# Patient Record
Sex: FEMALE | Race: WHITE | Employment: OTHER | URBAN - METROPOLITAN AREA
[De-identification: names, ages, dates, MRNs, and addresses within clinical notes are randomized per-mention and may not be internally consistent; named-entity substitution may affect disease eponyms.]

---

## 2016-05-23 LAB
CREATININE, EXTERNAL: 0.8
LDL-C, EXTERNAL: 116.4

## 2017-03-28 DIAGNOSIS — C56.9 OVARIAN CANCER, UNSPECIFIED LATERALITY (HCC): Primary | ICD-10-CM

## 2017-03-29 LAB — CANCER AG125 SERPL-ACNC: 8.7 U/ML (ref 0–38.1)

## 2017-04-05 ENCOUNTER — TELEPHONE (OUTPATIENT)
Dept: GYNECOLOGY | Age: 82
End: 2017-04-05

## 2017-04-05 NOTE — TELEPHONE ENCOUNTER
Pt calling to office for  results from 3/28/17, I advised pt value was 8.7, pt verbalized understanding

## 2017-10-03 ENCOUNTER — OFFICE VISIT (OUTPATIENT)
Dept: GYNECOLOGY | Age: 82
End: 2017-10-03

## 2017-10-03 VITALS
DIASTOLIC BLOOD PRESSURE: 77 MMHG | SYSTOLIC BLOOD PRESSURE: 160 MMHG | BODY MASS INDEX: 27.25 KG/M2 | WEIGHT: 168.8 LBS | HEART RATE: 59 BPM

## 2017-10-03 DIAGNOSIS — Z91.89 GYN EXAM FOR HIGH-RISK MEDICARE PATIENT: Primary | ICD-10-CM

## 2017-10-03 DIAGNOSIS — Z85.43 HISTORY OF OVARIAN CANCER: ICD-10-CM

## 2017-10-03 RX ORDER — ESTRADIOL 0.1 MG/G
CREAM VAGINAL
COMMUNITY
Start: 2017-06-27

## 2017-10-03 NOTE — PROGRESS NOTES
One year check up, pt reports no abnormal spotting or bleeding, pt states she has no questions or concerns for today's visit, pt states that no internal to be done, declines to get undressed, Initial blood pressure reading 170/93, repeat blood pressure reading 160/77 , Patient states she is no longer taking the following medications: Clobetasol

## 2017-10-03 NOTE — PROGRESS NOTES
[address] [facility] [address], [address] [facility]  [phone]  MD Gaston Pérez MD    Patient ID:  Deepak Amado  [id number]  1935/82 y.o. Visit date: 10/3/2017      Diagnosis:       ICD-10-CM ICD-9-CM    1. GYN exam for high-risk Medicare patient Z91.89 V72.31      V15.89    2. History of ovarian cancer Z85.43 V10.43      INTERVAL HISTORY: Deepak Amado is a  female with a history of ovarian cancer. Recent office visit for vulvitis. Rx Clobetasol qhs. The patient presents today in followup    Pruritis improving, cracking of the L minora noted. Pathology:   FINAL PATHOLOGIC DIAGNOSIS  1. Vulva, right 11 o'clock, biopsy:  Subacute spongiotic vulvitis  2. Vulva, left 1 o'clock, biopsy:  Subacute spongiotic vulvitis  See comment  Comment  The findings could be seen in a variety of forms of spongiotic (eczematous) vulvitis. Leading considerations would  include contact vulvitis and a drug reaction. A review of medications especially topical agents may be beneficial    Imaging history: Mammogram current  Chemotherapy history: See record  10/3/2017:  The patient is seen at a one year interval. No complaints.   Refused pelvic examination  Ongoing treatment for urinary incontinence   PT.    OB/GYN ROS: Denies, dysuria, hematuria, vaginal discharge, abnormal vaginal bleeding, pelvic pain    Past Medical History:   Diagnosis Date    History of chemotherapy 2002    Taxol/carboplatin x 6    Ovarian cancer ([facility] [address] 2002       Past Surgical History:   Procedure Laterality Date    BREAST SURGERY PROCEDURE UNLISTED      breast biopsy 1983    HX GYN  2002    MARGARETTE/BSO, dilation and curratagex2    HX TONSILLECTOMY      HX VASCULAR ACCESS  2002    INSERTION / REMOVAL       Social History     Social History    Marital status:      Spouse name: N/A    Number of children: N/A    Years of education: N/A     Occupational History  Not on file. Social History Main Topics    Smoking status: Former Smoker    Smokeless tobacco: Never Used    Alcohol use Yes      Comment: RARE    Drug use: No    Sexual activity: Not on file     Other Topics Concern    Not on file     Social History Narrative       Family History   Problem Relation Age of Onset    Heart Disease Mother     Heart Disease Father     Cancer Maternal Aunt      LUNG    Cancer Maternal Aunt      BREAST       Current Outpatient Prescriptions on File Prior to Visit   Medication Sig Dispense Refill    cholecalciferol (VITAMIN D3) 1,000 unit tablet Take  by mouth daily.  CALCIUM CARBONATE/VITAMIN D3 (CALCIUM + D PO) Take  by mouth daily.  CARBOXYMETHYLCELLULOSE SODIUM (THERA TEARS OP) Apply  to eye four (4) times daily.  BIOTIN PO Take  by mouth daily.  clobetasol (TEMOVATE) 0.05 % ointment Apply  to affected area nightly. 15 g 3    travoprost (TRAVATAN Z) 0.004 % ophthalmic solution Administer 1 Drop to both eyes every evening. No current facility-administered medications on file prior to visit.         No Known Allergies    ROS:  Review of Systems - General ROS: negative for - chills, fatigue, hot flashes or weight loss  Hematological and Lymphatic ROS: negative for - bruising or weight loss  Endocrine ROS: Negative  Respiratory ROS: no cough, shortness of breath, or wheezing  Cardiovascular ROS: no chest pain or dyspnea on exertion  Gastrointestinal ROS: no abdominal pain, change in bowel habits, or black or bloody stools  Genito-Urinary ROS: negative for - genital discharge, hematuria       OBJECTIVE:  PHYSICAL EXAM  VITAL SIGNS: Visit Vitals    /77 (BP 1 Location: Left arm, BP Patient Position: Sitting)    Pulse (!) 59    Wt 168 lb 12.8 oz (76.6 kg)    BMI 27.25 kg/m2      GENERAL ERIN: in no apparent distress, well developed and well nourished, in no respiratory distress and acyanotic, alert and afebrile  Nodes negative  Lungs: Clear to auscultation and percussion  Cardiac: RRR  Abdomen: Soft, nontender. No masses, organomegaly or fluid wave. No CVAT:  Pelvic Deferred by patient  Neuro: Grossly intact. DATE REVIEW as available:  Lab Results   Component Value Date/Time    WBC 6.2 02/26/2013 01:50 PM     Lab Results   Component Value Date/Time    Sodium 139 02/26/2013 01:50 PM       IMPRESSION AND PLAN:    Robinson Olea has a working diagnosis of a normal annual examination. History of ovarian cancer.     ZACK   Urinary incontinence      Return annually or PRN    Marilee Mcnamara MD  10/3/2017/7:41 AM

## 2017-10-03 NOTE — PATIENT INSTRUCTIONS
Da[employer]is Activation    Thank you for requesting access to [employer]. Please follow the instructions below to securely access and download your online medical record. [employer] allows you to send messages to your doctor, view your test results, renew your prescriptions, schedule appointments, and more. How Do I Sign Up? 1. In your internet browser, go to [email or web address]. 2. Click on the First Time User? Click Here link in the Sign In box. You will see the New Member Sign Up page. 3. Enter your Daegis Access Code exactly as it appears below. You will not need to use this code after youve completed the sign-up process. If you do not sign up before the expiration date, you must request a new code. Daegis Access Code: [email or web address]  Expires: 2018 10:54 AM (This is the date your Daegis access code will )    4. Enter the last four digits of your Social Security Number (xxxx) and Date of Birth (mm/dd/yyyy) as indicated and click Submit. You will be taken to the next sign-up page. 5. Create a Daegis ID. This will be your [employer]egis login ID and cannot be changed, so think of one that is secure and easy to remember. 6. Create a Daegis password. You can change your password at any time. 7. Enter your Password Reset Question and Answer. This can be used at a later time if you forget your password. 8. Enter your e-mail address. You will receive e-mail notification when new information is available in [address]. 9. Click Sign Up. You can now view and download portions of your medical record. 10. Click the Download Summary menu link to download a portable copy of your medical information. Additional Information    If you have questions, please visit the Frequently Asked Questions section of the [employer]egis website at [email or web address] Remember, [employer]is is NOT to be used for urgent needs. For medical emergencies, dial 911.

## 2018-02-19 ENCOUNTER — OFFICE VISIT (OUTPATIENT)
Dept: GYNECOLOGY | Age: 83
End: 2018-02-19

## 2018-02-19 VITALS
WEIGHT: 174.6 LBS | HEIGHT: 66 IN | BODY MASS INDEX: 28.06 KG/M2 | SYSTOLIC BLOOD PRESSURE: 180 MMHG | HEART RATE: 64 BPM | DIASTOLIC BLOOD PRESSURE: 93 MMHG

## 2018-02-19 DIAGNOSIS — Q52.5 LABIA MINORA AGGLUTINATION: ICD-10-CM

## 2018-02-19 DIAGNOSIS — Z85.43 HISTORY OF OVARIAN CANCER: Primary | ICD-10-CM

## 2018-02-19 RX ORDER — BIMATOPROST 0.1 MG/ML
SOLUTION/ DROPS OPHTHALMIC
COMMUNITY
Start: 2018-02-08

## 2018-02-19 NOTE — PROGRESS NOTES
Consult visit pt has questions, pt reports no abnormal spotting or bleeding, Initial blood pressure reading 168/105, repeat blood pressure reading 180/93

## 2018-05-16 ENCOUNTER — OFFICE VISIT (OUTPATIENT)
Dept: INTERNAL MEDICINE CLINIC | Age: 83
End: 2018-05-16

## 2018-05-16 VITALS
BODY MASS INDEX: 28.82 KG/M2 | OXYGEN SATURATION: 96 % | HEART RATE: 74 BPM | RESPIRATION RATE: 14 BRPM | HEIGHT: 65 IN | WEIGHT: 173 LBS | TEMPERATURE: 98.1 F | DIASTOLIC BLOOD PRESSURE: 82 MMHG | SYSTOLIC BLOOD PRESSURE: 142 MMHG

## 2018-05-16 DIAGNOSIS — C56.9 MALIGNANT NEOPLASM OF OVARY, UNSPECIFIED LATERALITY (HCC): ICD-10-CM

## 2018-05-16 DIAGNOSIS — Z13.39 SCREENING FOR ALCOHOLISM: ICD-10-CM

## 2018-05-16 DIAGNOSIS — Z13.31 DEPRESSION SCREENING NEGATIVE: ICD-10-CM

## 2018-05-16 DIAGNOSIS — M89.9 DISORDER OF BONE AND CARTILAGE: ICD-10-CM

## 2018-05-16 DIAGNOSIS — Z23 ENCOUNTER FOR IMMUNIZATION: ICD-10-CM

## 2018-05-16 DIAGNOSIS — Z00.00 MEDICARE ANNUAL WELLNESS VISIT, SUBSEQUENT: Primary | ICD-10-CM

## 2018-05-16 DIAGNOSIS — Z71.89 ACP (ADVANCE CARE PLANNING): ICD-10-CM

## 2018-05-16 DIAGNOSIS — E66.3 OVERWEIGHT (BMI 25.0-29.9): ICD-10-CM

## 2018-05-16 DIAGNOSIS — M94.9 DISORDER OF BONE AND CARTILAGE: ICD-10-CM

## 2018-05-16 PROBLEM — H90.6 MIXED CONDUCTIVE AND SENSORINEURAL HEARING LOSS OF BOTH EARS: Status: ACTIVE | Noted: 2018-05-16

## 2018-05-16 PROBLEM — J30.2 SEASONAL ALLERGIC RHINITIS: Status: ACTIVE | Noted: 2018-05-16

## 2018-05-16 NOTE — PROGRESS NOTES
Janay Palacios is a [de-identified] y.o. female who was seen in clinic today (5/16/2018) for a full physical.        Assessment & Plan:   Diagnoses and all orders for this visit:    1. Medicare annual wellness visit, subsequent       - will defer labs pending review of previous PCP records       - BP at goal for age    3. ACP (advance care planning)  -     FULL CODE    3. Encounter for immunization  -     diph,Pertuss,Acell,,Tet Vac-PF (ADACEL) 2 Lf-(2.5-5-3-5 mcg)-5Lf/0.5 mL susp; 0.5 mL by IntraMUSCular route once for 1 dose.  -     varicella-zoster recombinant, PF, (SHINGRIX) 50 mcg/0.5 mL susr injection; 0.5 mL IM once now and then repeat in 2-6 months    4. Depression screening negative    5. Overweight (BMI 25.0-29. 9)- chronic issue, new to me, overall stable, I have reviewed/discussed the above normal BMI with the patient. I have recommended the following interventions: lifestyle education regarding diet. 6. Screening for alcoholism  -     Annual  Alcohol Screen 15 min ()    7. Disorder of bone and cartilage- overdue for testing, will get DEXA set up. -     Dexa Bone Density Study Axial ([id number]); Future    8. Malignant neoplasm of ovary, unspecified laterality ([facility] [address]- new dx to me, chronic problem, in remission, specialist notes reviewed, will defer to specialist          Follow-up Disposition:  Return in about 1 year (around 5/16/2019), or if symptoms worsen or fail to improve.        ------------------------------------------------------------------------------------------    Subjective: Annual Wellness Visit- Subsequent Visit    End of Life Planning: This was discussed with her today and she has an advanced directive - a copy HAS NOT been provided. Reviewed DNR/DNI and patient is not interested.       Depression Screen:  PHQ over the last two weeks 5/16/2018   Little interest or pleasure in doing things Not at all   Feeling down, depressed or hopeless Not at all   Total Score PHQ 2 0         Fall Risk: Fall Risk Assessment, last 12 mths 5/16/2018   Able to walk? Yes   Fall in past 12 months? No       Abuse Screen:  Abuse Screening Questionnaire 5/16/2018   Do you ever feel afraid of your partner? N   Are you in a relationship with someone who physically or mentally threatens you? N   Is it safe for you to go home? Y         Alcohol Risk Factor Screening: On any occasion during the past 3 months, have you had more than 3 drinks containing alcohol? No  Do you average more than 7 drinks per week? No    Hearing Loss: The patient wears hearing aids. Cognition Screen:  Has your family/caregiver stated any concerns about your memory: no    Activities of Daily Living:    Requires assistance with: no ADLs  Home contains: handrails and grab bars  Exercise: very active    Adult Nutrition Screen:  No risk factors noted. Health Maintenance  Daily Aspirin: n/a  Bone Density: not UTD - order placed  Glaucoma Screening: records requested    Immunizations:    Influenza: up to date. Tetanus: not UTD- script provided. Shingles: due for Shingrix - script provided. Pneumonia: likely UTD, will get previous PCP records. Cancer screening:    Cervical: reviewed guidelines, n/a. Breast: reviewed guidelines, UTD. Colon: records requested, guidelines reviewed, n/a. Patient Care Team:  Jose Daniel Lara MD as PCP - General (Internal Medicine)  Madison Jj MD (Gynecologic Oncology)  Anup Ramírez MD as Physician (Gastroenterology)  Jose Luis Chavez OD (Optometry)  Mariely German MD as Physician (Female Pelvic Medicine and Reconstructive Surgery)  Feliciano Hood MD as Physician (Dermatology)       The following sections were reviewed & updated as appropriate: PMH, PSH, FH, and SH.       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 9) E66.3          Prior to Admission medications    Medication Sig Start Date End Date Taking? Authorizing Provider   LUMIGAN 0.01 % ophthalmic drops  2/8/18  Yes Historical Provider   ESTRACE 0.01 % (0.1 mg/gram) vaginal cream  6/27/17  Yes Historical Provider   cholecalciferol (VITAMIN D3) 1,000 unit tablet Take  by mouth daily. Yes Historical Provider   CALCIUM CARBONATE/VITAMIN D3 (CALCIUM + D PO) Take  by mouth daily. Yes Historical Provider   CARBOXYMETHYLCELLULOSE SODIUM (THERA TEARS OP) Apply  to eye four (4) times daily. Yes Historical Provider   BIOTIN PO Take  by mouth daily. Yes Historical Provider          No Known Allergies           Review of Systems   Constitutional: Negative for chills and fever. Respiratory: Negative for cough and shortness of breath. Cardiovascular: Negative for chest pain and palpitations. Gastrointestinal: Negative for abdominal pain, blood in stool, constipation, diarrhea, heartburn, nausea and vomiting. Musculoskeletal: Negative for joint pain and myalgias. Neurological: Positive for tingling (both feet, chronic, not changing). Negative for focal weakness and headaches. Endo/Heme/Allergies: Does not bruise/bleed easily. Psychiatric/Behavioral: Negative for depression. The patient is not nervous/anxious and does not have insomnia. Objective:   Physical Exam   Constitutional: She appears well-developed. No distress. HENT:   Mouth/Throat: Mucous membranes are normal.   Eyes: Conjunctivae and lids are normal. No scleral icterus. Neck: Neck supple. No thyromegaly present. Cardiovascular: Regular rhythm and normal heart sounds. No murmur heard. Pulses:       Dorsalis pedis pulses are 2+ on the right side, and 2+ on the left side. Posterior tibial pulses are 2+ on the right side, and 2+ on the left side. Pulmonary/Chest: Effort normal and breath sounds normal. She has no wheezes. She has no rales. Abdominal: Soft. Bowel sounds are normal. She exhibits no mass. There is no hepatosplenomegaly. There is no tenderness. Musculoskeletal: She exhibits no edema. Lymphadenopathy:     She has no cervical adenopathy. Skin: No rash noted. Psychiatric: She has a normal mood and affect. Her behavior is normal.          Visit Vitals    /82    Pulse 74    Temp 98.1 °F (36.7 °C) (Oral)    Resp 14    Ht 5' 5.1\" (1.654 m)    Wt 173 lb (78.5 kg)    SpO2 96%    BMI 28.7 kg/m2          Advised her to call back or return to office if symptoms worsen/change/persist.  Discussed expected course/resolution/complications of diagnosis in detail with patient. Medication risks/benefits/costs/interactions/alternatives discussed with patient. She was given an after visit summary which includes diagnoses, current medications, & vitals. She expressed understanding with the diagnosis and plan. Aspects of this note may have been generated using voice recognition software. Despite editing, there may be some syntax errors.        Sonia Montano MD

## 2018-05-16 NOTE — ACP (ADVANCE CARE PLANNING)
Advance Care Planning (ACP) Provider Note - Comprehensive     Date of ACP Conversation: 05/16/18  Persons included in Conversation:  patient  Length of ACP Conversation in minutes: <16 minutes (Non-Billable)    Authorized Decision Maker (if patient is incapable of making informed decisions): This person is: Healthcare Agent/Medical Power of  under Advance Directive      She has an advanced directive - a copy HAS NOT been provided. Reviewed DNR/DNI and patient is not interested. General ACP for ALL Patients with Decision Making Capacity:  Importance of advance care planning, including choosing a healthcare agent to communicate patient's healthcare decisions if patient lost the ability to make decisions, such as after a sudden illness or accident  Understanding of the healthcare agent role was assessed and information provided  Opportunity offered to explore how cultural, Spiritism, spiritual, or personal beliefs would affect decisions for future care  Exploration of values, goals, and preferences if recovery is not expected, even with continued medical treatment in the event of: Imminent death or severe, permanent brain injury    For Serious or Chronic Illness:  Understanding of CPR, goals and expected outcomes, benefits and burdens discussed.   Understanding of medical condition  Information on CPR success rates provided (e.g. for CPR in hospital, survival to d/c at two weeks is 22%, for chronically ill or elderly/frail survival is less than 3%)    Interventions Provided:  Recommended communicating the plan and making copies for the healthcare agent, personal physician, and others as appropriate (e.g., health system)  Recommended review of completed ACP document annually or upon change in health status

## 2018-05-16 NOTE — PATIENT INSTRUCTIONS
Medicare Wellness Visit, Female    The best way to live healthy is to have a healthy lifestyle by eating a well-balanced diet, exercising regularly, limiting alcohol and stopping smoking. Regular physical exams and screening tests are another way to keep healthy. Preventive exams provided by your health care provider can find health problems before they become diseases or illnesses. Preventive services including immunizations, screening tests, monitoring and exams can help you take care of your own health. All people over age 72 should have a pneumovax  and and a prevnar shot to prevent pneumonia. These are once in a lifetime unless you and your provider decide differently. All people over 65 should have a yearly flu shot and a tetanus vaccine every 10 years. A bone mass density to screen for osteoporosis or thinning of the bones should be done every 2 years after 65. Screening for diabetes mellitus with a blood sugar test should be done every year. Glaucoma is a disease of the eye due to increased ocular pressure that can lead to blindness and it should be done every year by an eye professional.    Cardiovascular screening tests that check for elevated lipids (fatty part of blood) which can lead to heart disease and strokes should be done every 5 years. Colorectal screening that evaluates for blood or polyps in your colon should be done yearly as a stool test or every five years as a flexible sigmoidoscope or every 10 years as a colonoscopy up to age 76. Breast cancer screening with a mammogram is recommended biennially  for women age 54-69. Screening for cervical cancer with a pap smear and pelvic exam is recommended for women after age 72 years every 2 years up to age 79 or when the provider and patient decide to stop. If there is a history of cervical abnormalities or other increased risk for cancer then the test is recommended yearly.     Hepatitis C screening is also recommended for anyone born between [birth year] through [address]. A shingles vaccine is also recommended once in a lifetime after age 61. Your Medicare Wellness Exam is recommended annually. Here is a list of your current Health Maintenance items with a due date:  Health Maintenance Due   Topic Date Due    Colonoscopy  09/30/1955    DTaP/Tdap/Td  (1 - Tdap) 09/30/1958    Glaucoma Screening   09/30/2002    Bone Mineral Density   09/30/2002    Pneumococcal Vaccine (2 of 2 - PPSV23) 11/11/2015    Annual Well Visit  03/14/2018            Learning About Living [place] Cancer  What is a living will? A living will is a legal form you use to write down the kind of care you want at the end of your life. It is used by the health professionals who will treat you if you aren't able to decide for yourself. If you put your wishes in writing, your loved ones and others will know what kind of care you want. They won't need to guess. This can ease your mind and be helpful to others. A living will is not the same as an estate or property will. An estate will explains what you want to happen with your money and property after you die. Is a living will a legal document? A living will is a legal document. Each state has its own laws about living cast[place]. If you move to another state, make sure that your living will is legal in the state where you now live. Or you might use a universal form that has been approved by many states. This kind of form can sometimes be completed and stored online. Your electronic copy will then be available wherever you have a connection to the Internet. In most cases, doctors will respect your wishes even if you have a form from a different state. · You don't need an  to complete a living will. But legal advice can be helpful if your state's laws are unclear, your health history is complicated, or your family can't agree on what should be in your living will. · You can change your living will at any time.  Some people find that their wishes about end-of-life care change as their health changes. · In addition to making a living will, think about completing a medical power of  form. This form lets you name the person you want to make end-of-life treatment decisions for you (your \"health care agent\") if you're not able to. Many hospitals and nursing homes will give you the forms you need to complete a living will and a medical power of . · Your living will is used only if you can't make or communicate decisions for yourself anymore. If you become able to make decisions again, you can accept or refuse any treatment, no matter what you wrote in your living will. · Your state may offer an online registry. This is a place where you can store your living will online so the doctors and nurses who need to treat you can find it right away. What should you think about when creating a living will? Talk about your end-of-life wishes with your family members and your doctor. Let them know what you want. That way the people making decisions for you won't be surprised by your choices. Think about these questions as you make your living will:  · Do you know enough about life support methods that might be used? If not, talk to your doctor so you know what might be done if you can't breathe on your own, your heart stops, or you're unable to swallow. · What things would you still want to be able to do after you receive life-support methods? Would you want to be able to walk? To speak? To eat on your own? To live without the help of machines? · If you have a choice, where do you want to be cared for? In your home? At a hospital or nursing home? · Do you want certain Taoist practices performed if you become very ill? · If you have a choice at the end of your life, where would you prefer to die? At home? In a hospital or nursing home? Somewhere else? · Would you prefer to be buried or cremated?   · Do you want your organs to be donated after you die? What should you do with your living will? · Make sure that your family members and your health care agent have copies of your living will. · Give your doctor a copy of your living will to keep in your medical record. If you have more than one doctor, make sure that each one has a copy. · You may want to put a copy of your living will where it can be easily found. Where can you learn more? Go to http://addy-latonia.info/. Enter E028 in the search box to learn more about \"Learning About Living [place]. \"  Current as of: August 8, 2016  Content Version: 11.3  © 2898-3314 [employer], [employer]. Care instructions adapted under license by [employer] (which disclaims liability or warranty for this information). If you have questions about a medical condition or this instruction, always ask your healthcare professional. [address] any warranty or liability for your use of this information.

## 2018-05-16 NOTE — MR AVS SNAPSHOT
[address] [place] [address] 
[phone] Patient: Maryanne Padilla MRN: [id number] [facility]:[birth year] Visit Information Date & Time Provider Department Dept. Phone Encounter #  
 5/16/2018  1:30 PM Se Braun [address] [place] [address] Internal Medicine [phone] [id number] Follow-up Instructions Return in about 1 year (around 5/16/2019), or if symptoms worsen or fail to improve. Upcoming Health Maintenance Date Due COLONOSCOPY 9/30/1953 DTaP/Tdap/Td series (1 - Tdap) 9/30/1958 GLAUCOMA SCREENING Q2Y 9/30/2002 Bone Densitometry (Dexa) Screening 9/30/2002 Pneumococcal 65+ High/Highest Risk (2 of 2 - PPSV23) 11/11/2015 MEDICARE YEARLY EXAM 3/14/2018 Influenza Age 5 to Adult 8/1/2018 BREAST CANCER SCRN MAMMOGRAM 3/7/2019 Allergies as of 5/16/2018  Review Complete On: 5/16/2018 By: Justyn Varghese RN No Known Allergies Current Immunizations  Reviewed on 5/16/2018 Name Date Influenza Vaccine 10/18/2017, 11/11/2015, 12/19/2014 Pneumococcal Conjugate (PCV-13) 9/16/2015 Zoster Vaccine, Live 11/1/2015 Reviewed by Justyn Varghese RN on 5/16/2018 at  1:48 PM  
You Were Diagnosed With   
  
 Codes Comments Medicare annual wellness visit, subsequent    -  Primary ICD-10-CM: Z00.00 ICD-9-CM: V70.0 ACP (advance care planning)     ICD-10-CM: Z71.89 ICD-9-CM: V65.49 Encounter for immunization     ICD-10-CM: T25 ICD-9-CM: V03.89 Depression screening negative     ICD-10-CM: Z13.89 ICD-9-CM: V79.0 Overweight (BMI 25.0-29. 9)     ICD-10-CM: I15.3 ICD-9-CM: 278.02 Screening for alcoholism     ICD-10-CM: Z13.89 ICD-9-CM: V79.1 Disorder of bone and cartilage     ICD-10-CM: M89.9, M94.9 ICD-9-CM: 733.90 Malignant neoplasm of ovary, unspecified laterality ([facility] [address]     ICD-10-CM: C56.9 ICD-9-CM: 183. 0 Vitals BP Pulse Temp Resp Height(growth percentile) Weight(growth percentile) 142/82 74 98.1 °F (36.7 °C) (Oral) 14 5' 5.1\" (1.654 m) 173 lb (78.5 kg) SpO2 BMI OB Status Smoking Status 96% 28.7 kg/m2 Hysterectomy Former Smoker BMI and BSA Data Body Mass Index Body Surface Area 28.7 kg/m 2 1.9 m 2 Preferred Pharmacy Pharmacy Name Phone [phone] [facility] [phone] Your Updated Medication List  
  
   
This list is accurate as of 18  2:25 PM.  Always use your most recent med list.  
  
  
  
  
 BIOTIN PO Take  by mouth daily. CALCIUM + D PO Take  by mouth daily. diph,Pertuss(Acell),Tet Vac-PF 2 Lf-(2.5-5-3-5 mcg)-5Lf/0.5 mL susp Commonly known as:  ADACEL  
0.5 mL by IntraMUSCular route once for 1 dose. ESTRACE 0.01 % (0.1 mg/gram) vaginal cream  
Generic drug:  estradiol LUMIGAN 0.01 % ophthalmic drops Generic drug:  bimatoprost  
  
 THERA TEARS OP Apply  to eye four (4) times daily. varicella-zoster recombinant (PF) 50 mcg/0.5 mL Susr injection Commonly known as:  SHINGRIX  
0.5 mL IM once now and then repeat in 2-6 months VITAMIN D3 1,000 unit tablet Generic drug:  cholecalciferol Take  by mouth daily. Prescriptions Printed Refills diph,Pertuss,Acell,,Tet Vac-PF (ADACEL) 2 Lf-(2.5-5-3-5 mcg)-5Lf/0.5 mL susp 0 Si.5 mL by IntraMUSCular route once for 1 dose. Class: Print Route: IntraMUSCular  
 varicella-zoster recombinant, PF, (SHINGRIX) 50 mcg/0.5 mL susr injection 1 Si.5 mL IM once now and then repeat in 2-6 months Class: Print We Performed the Following FULL CODE [COD2 Custom] RI ANNUAL ALCOHOL SCREEN 15 MIN [id number] [facility]] Follow-up Instructions Return in about 1 year (around 2019), or if symptoms worsen or fail to improve. To-Do List   
 2018 Imaging:  DEXA BONE DENSITY STUDY AXIAL Patient Instructions Medicare Wellness Visit, Female The best way to live healthy is to have a healthy lifestyle by eating a well-balanced diet, exercising regularly, limiting alcohol and stopping smoking. Regular physical exams and screening tests are another way to keep healthy. Preventive exams provided by your health care provider can find health problems before they become diseases or illnesses. Preventive services including immunizations, screening tests, monitoring and exams can help you take care of your own health. All people over age 72 should have a pneumovax  and and a prevnar shot to prevent pneumonia. These are once in a lifetime unless you and your provider decide differently. All people over 65 should have a yearly flu shot and a tetanus vaccine every 10 years. A bone mass density to screen for osteoporosis or thinning of the bones should be done every 2 years after 65. Screening for diabetes mellitus with a blood sugar test should be done every year. Glaucoma is a disease of the eye due to increased ocular pressure that can lead to blindness and it should be done every year by an eye professional. 
 
Cardiovascular screening tests that check for elevated lipids (fatty part of blood) which can lead to heart disease and strokes should be done every 5 years. Colorectal screening that evaluates for blood or polyps in your colon should be done yearly as a stool test or every five years as a flexible sigmoidoscope or every 10 years as a colonoscopy up to age 76. Breast cancer screening with a mammogram is recommended biennially  for women age 54-69. Screening for cervical cancer with a pap smear and pelvic exam is recommended for women after age 72 years every 2 years up to age 79 or when the provider and patient decide to stop. If there is a history of cervical abnormalities or other increased risk for cancer then the test is recommended yearly. Hepatitis C screening is also recommended for anyone born between 80 through [address]. A shingles vaccine is also recommended once in a lifetime after age 61. Your Medicare Wellness Exam is recommended annually. Here is a list of your current Health Maintenance items with a due date: 
Health Maintenance Due Topic Date Due  
 Colonoscopy  09/30/1953  DTaP/Tdap/Td  (1 - Tdap) 09/30/1958  Glaucoma Screening   09/30/2002  Bone Mineral Density   09/30/2002  Pneumococcal Vaccine (2 of 2 - PPSV23) 11/11/2015 [address] [place] [address] Annual Well Visit  03/14/2018 Ezra Reyes 1721 What is a living will? A living will is a legal form you use to write down the kind of care you want at the end of your life. It is used by the health professionals who will treat you if you aren't able to decide for yourself. If you put your wishes in writing, your loved ones and others will know what kind of care you want. They won't need to guess. This can ease your mind and be helpful to others. A living will is not the same as an estate or property will. An estate will explains what you want to happen with your money and property after you die. Is a living will a legal document? A living will is a legal document. Each state has its own laws about living castillo. If you move to another state, make sure that your living will is legal in the state where you now live. Or you might use a universal form that has been approved by many states. This kind of form can sometimes be completed and stored online. Your electronic copy will then be available wherever you have a connection to the Internet. In most cases, doctors will respect your wishes even if you have a form from a different state. · You don't need an  to complete a living will.  But legal advice can be helpful if your state's laws are unclear, your health history is complicated, or your family can't agree on what should be in your living will. · You can change your living will at any time. Some people find that their wishes about end-of-life care change as their health changes. · In addition to making a living will, think about completing a medical power of  form. This form lets you name the person you want to make end-of-life treatment decisions for you (your \"health care agent\") if you're not able to. Many hospitals and nursing homes will give you the forms you need to complete a living will and a medical power of . · Your living will is used only if you can't make or communicate decisions for yourself anymore. If you become able to make decisions again, you can accept or refuse any treatment, no matter what you wrote in your living will. · Your state may offer an online registry. This is a place where you can store your living will online so the doctors and nurses who need to treat you can find it right away. What should you think about when creating a living will? Talk about your end-of-life wishes with your family members and your doctor. Let them know what you want. That way the people making decisions for you won't be surprised by your choices. Think about these questions as you make your living will: · Do you know enough about life support methods that might be used? If not, talk to your doctor so you know what might be done if you can't breathe on your own, your heart stops, or you're unable to swallow. · What things would you still want to be able to do after you receive life-support methods? Would you want to be able to walk? To speak? To eat on your own? To live without the help of machines? · If you have a choice, where do you want to be cared for? In your home? At a hospital or nursing home? · Do you want certain Judaism practices performed if you become very ill? · If you have a choice at the end of your life, where would you prefer to die? At home? In a hospital or nursing home? Somewhere else? · Would you prefer to be buried or cremated? · Do you want your organs to be donated after you die? What should you do with your living will? · Make sure that your family members and your health care agent have copies of your living will. · Give your doctor a copy of your living will to keep in your medical record. If you have more than one doctor, make sure that each one has a copy. · You may want to put a copy of your living will where it can be easily found. Where can you learn more? Go to http://addy-latonia.info/. Enter A746 in the search box to learn more about \"Learning About Living Peggy Flair. \" Current as of: August 8, 2016 Content Version: 11.3 © 9897-[address]446 [address]. Care instructions adapted under license by [employer] (which disclaims liability or warranty for this information). If you have questions about a medical condition or this instruction, always ask your healthcare professional. [address] any warranty or liability for your use of this information. Introducing hospitals & HEALTH SERVICES! [facility] introduces [employer] patient portal. Now you can access parts of your medical record, email your doctor's office, and request medication refills online. 1. In your internet browser, go to [email or web address] 2. Click on the First Time User? Click Here link in the Sign In box. You will see the New Member Sign Up page. 3. Enter your [employer] Access Code exactly as it appears below. You will not need to use this code after youve completed the sign-up process. If you do not sign up before the expiration date, you must request a new code. · [employer] Access Code: [email or web address] Expires: 5/20/2018  4:03 PM 
 
 4. Enter the last four digits of your Social Security Number (xxxx) and Date of Birth (mm/dd/yyyy) as indicated and click Submit. You will be taken to the next sign-up page. 5. Create a [employer] ID. This will be your [employer] login ID and cannot be changed, so think of one that is secure and easy to remember. 6. Create a [employer] password. You can change your password at any time. 7. Enter your Password Reset Question and Answer. This can be used at a later time if you forget your password. 8. Enter your e-mail address. You will receive e-mail notification when new information is available in [address] 9. Click Sign Up. You can now view and download portions of your medical record. 10. Click the Download Summary menu link to download a portable copy of your medical information. If you have questions, please visit the Frequently Asked Questions section of the [employer] website. Remember, [employer] is NOT to be used for urgent needs. For medical emergencies, dial 911. Now available from your iPhone and Android! Please provide this summary of care documentation to your next provider. Your primary care clinician is listed as Tristian Duenas. If you have any questions after today's visit, please call [phone].

## 2018-05-24 ENCOUNTER — HOSPITAL ENCOUNTER (OUTPATIENT)
Dept: MAMMOGRAPHY | Age: 83
Discharge: HOME | End: 2018-05-24
Attending: INTERNAL MEDICINE
Payer: MEDICARE

## 2018-05-24 DIAGNOSIS — M89.9 DISORDER OF BONE AND CARTILAGE: ICD-10-CM

## 2018-05-24 DIAGNOSIS — M94.9 DISORDER OF BONE AND CARTILAGE: ICD-10-CM

## 2018-05-24 PROBLEM — M85.89 OSTEOPENIA OF MULTIPLE SITES: Status: ACTIVE | Noted: 2018-05-24

## 2018-05-24 PROCEDURE — 77080 DXA BONE DENSITY AXIAL: CPT

## 2018-05-25 NOTE — PROGRESS NOTES
Please call patient. DEXA shows osteopenia, borderline osteoporosis. Can consider Fosamax, 35mg, 1 tab PO weekly to prevent this from becoming osteoporosis. Also report states ?  Compression fx, will recommend thoracic xray (dx- compression fracture)

## 2018-05-29 ENCOUNTER — TELEPHONE (OUTPATIENT)
Dept: INTERNAL MEDICINE CLINIC | Age: 83
End: 2018-05-29

## 2018-05-29 NOTE — PROGRESS NOTES
Called pt and reported to pt that the DEXA shows osteopenia which is borderline osteoporosis. Can recommend to pt to take Fosamax weekly to prevent pt having osteoporosis. Also informed the pt the report states pt may have compression fx and Dr Nancy White is recommending a thoracic xray. Pt is having a lot of questions and would like to come in to discuss. Pt is scheduled for tomorrow with Dr Nancy White.

## 2018-06-01 ENCOUNTER — HOSPITAL ENCOUNTER (OUTPATIENT)
Dept: GENERAL RADIOLOGY | Age: 83
Discharge: HOME | End: 2018-06-01
Payer: MEDICARE

## 2018-06-01 ENCOUNTER — OFFICE VISIT (OUTPATIENT)
Dept: INTERNAL MEDICINE CLINIC | Age: 83
End: 2018-06-01

## 2018-06-01 VITALS
OXYGEN SATURATION: 96 % | DIASTOLIC BLOOD PRESSURE: 80 MMHG | HEIGHT: 65 IN | RESPIRATION RATE: 16 BRPM | BODY MASS INDEX: 28.66 KG/M2 | WEIGHT: 172 LBS | TEMPERATURE: 97.9 F | HEART RATE: 72 BPM | SYSTOLIC BLOOD PRESSURE: 158 MMHG

## 2018-06-01 DIAGNOSIS — M85.89 OSTEOPENIA OF MULTIPLE SITES: ICD-10-CM

## 2018-06-01 DIAGNOSIS — M85.89 OSTEOPENIA OF MULTIPLE SITES: Primary | ICD-10-CM

## 2018-06-01 PROCEDURE — 72072 X-RAY EXAM THORAC SPINE 3VWS: CPT

## 2018-06-01 RX ORDER — ALENDRONATE SODIUM 35 MG/1
35 TABLET ORAL
Qty: 12 TAB | Refills: 0 | Status: SHIPPED | OUTPATIENT
Start: 2018-06-01 | End: 2018-09-07 | Stop reason: ALTCHOICE

## 2018-06-01 NOTE — PATIENT INSTRUCTIONS
Preventing Osteoporosis: Care Instructions  Your Care Instructions    Osteoporosis means the bones are weak and thin enough that they can break easily. The older you are, the more likely you are to get osteoporosis. But with plenty of calcium, vitamin D, and exercise, you can help prevent osteoporosis. The preteen and teen years are a key time for bone building. With the help of calcium, vitamin D, and exercise in those early years and beyond, the bones reach their peak density and strength by age 27. After age 27, your bones naturally start to thin and weaken. The stronger your bones are at around age 27, the lower your risk for osteoporosis. But no matter what your age and risk are, your bones still need calcium, vitamin D, and exercise to stay strong. Also avoid smoking, and limit alcohol. Smoking and heavy alcohol use can make your bones thinner. Talk to your doctor about any special risks you might have, such as having a close relative with osteoporosis or taking a medicine that can weaken bones. Your doctor can tell you the best ways to protect your bones from thinning. Follow-up care is a key part of your treatment and safety. Be sure to make and go to all appointments, and call your doctor if you are having problems. It's also a good idea to know your test results and keep a list of the medicines you take. How can you care for yourself at home? · Get enough calcium and vitamin D. The [employer] of [employer] recommends adults younger than age 46 need 1,000 mg of calcium and 600 IU of vitamin D each day. Women ages 46 to 79 need 1,200 mg of calcium and 600 IU of vitamin D each day. Men ages 46 to 79 need 1,000 mg of calcium and 600 IU of vitamin D each day. Adults 71 and older need 1,200 mg of calcium and 800 IU of vitamin D each day. ¨ Eat foods rich in calcium, like yogurt, cheese, milk, and dark green vegetables.   ¨ Eat foods rich in vitamin D, like eggs, fatty fish, cereal, and fortified milk.  ¨ Get some sunshine. Your body uses sunshine to make its own vitamin D. The safest time to be out in the sun is before 10 a.m. or after 3 p.m. Avoid getting sunburned. Sunburn can increase your risk of skin cancer. ¨ Talk to your doctor about taking a calcium plus vitamin D supplement. Ask about what type of calcium is right for you, and how much to take at a time. Adults ages 23 to 48 should not get more than 2,500 mg of calcium and 4,000 IU of vitamin D each day, whether it is from supplements and/or food. Adults ages 46 and older should not get more than 2,000 mg of calcium and 4,000 IU of vitamin D each day from supplements and/or food. · Get regular bone-building exercise. Weight-bearing and resistance exercises keep bones healthy by working the muscles and bones against gravity. Start out at an exercise level that feels right for you. Add a little at a time until you can do the following:  ¨ Do 30 minutes of weight-bearing exercise on most days of the week. Walking, jogging, stair climbing, and dancing are good choices. ¨ Do resistance exercises with weights or elastic bands 2 to 3 days a week. · Limit alcohol. Drink no more than 1 alcohol drink a day if you are a woman. Drink no more than 2 alcohol drinks a day if you are a man. · Do not smoke. Smoking can make bones thin faster. If you need help quitting, talk to your doctor about stop-smoking programs and medicines. These can increase your chances of quitting for good. When should you call for help? Watch closely for changes in your health, and be sure to contact your doctor if you have any problems. Where can you learn more? Go to http://addy-latonia.info/. Enter S238 in the search box to learn more about \"Preventing Osteoporosis: Care Instructions. \"  Current as of: May 12, 2017  Content Version: 11.4  © 7146-7230 [employer].  Care instructions adapted under license by [employer] (which disclaims liability or warranty for this information). If you have questions about a medical condition or this instruction, always ask your healthcare professional. [place] [address] any warranty or liability for your use of this information.

## 2018-06-01 NOTE — PROGRESS NOTES
Bal Maharaj is a [de-identified] y.o. female who was seen in clinic today (6/1/2018). Assessment & Plan:   Diagnoses and all orders for this visit:    1. Osteopenia of multiple sites- this is a new problem, she is asymptomatic. I spent 15 minutes with the patient and >50% of the time was spent counseling on results, treatment options, and expectations. All her questions were answered. Will start on meds below. Will restart Ca+D. See AVS, Red flags were reviewed with the patient to RTC or notify me. Due to abnormal DEXA and ? about T8 compression fx will check xray. -     XR SPINE THORAC 3 V; Future  -     alendronate (FOSAMAX) 35 mg tablet; Take 1 Tab by mouth every seven (7) days. Follow-up Disposition:  Return if symptoms worsen or fail to improve. Subjective:   Danica was seen today for Results (Bone density)    Endocrine Review  She is seen for osteopenia. Since last visit: had DEXA. She is on the following treatment: nothing. She had multiple questions about results, medications, and risks. She denies any falls or joint pain. No GERD, abdominal pain, or n/v.        Prior to Admission medications    Medication Sig Start Date End Date Taking? Authorizing Provider   LUMIGAN 0.01 % ophthalmic drops  2/8/18  Yes Historical Provider   ESTRACE 0.01 % (0.1 mg/gram) vaginal cream  6/27/17  Yes Historical Provider   CARBOXYMETHYLCELLULOSE SODIUM (THERA TEARS OP) Apply  to eye four (4) times daily. Yes Historical Provider   cholecalciferol (VITAMIN D3) 1,000 unit tablet Take  by mouth daily. Historical Provider   CALCIUM CARBONATE/VITAMIN D3 (CALCIUM + D PO) Take  by mouth daily.     Historical Provider          No Known Allergies        ROS - deferred         Objective:   Physical Exam - deferred      Visit Vitals    /80    Pulse 72    Temp 97.9 °F (36.6 °C) (Oral)    Resp 16    Ht 5' 5.1\" (1.654 m)    Wt 172 lb (78 kg)    SpO2 96%    BMI 28.53 kg/m2 Disclaimer:  Advised her to call back or return to office if symptoms worsen/change/persist.  Discussed expected course/resolution/complications of diagnosis in detail with patient. Medication risks/benefits/costs/interactions/alternatives discussed with patient. She was given an after visit summary which includes diagnoses, current medications, & vitals. She expressed understanding with the diagnosis and plan. Aspects of this note may have been generated using voice recognition software. Despite editing, there may be some syntax errors.        Sahra Tenorio MD

## 2018-06-06 ENCOUNTER — TELEPHONE (OUTPATIENT)
Dept: INTERNAL MEDICINE CLINIC | Age: 83
End: 2018-06-06

## 2018-06-06 NOTE — TELEPHONE ENCOUNTER
Patient called back to let you know she had her last mammogram on 3/7/18 at [facility] at [facility].

## 2018-09-07 ENCOUNTER — OFFICE VISIT (OUTPATIENT)
Dept: INTERNAL MEDICINE CLINIC | Age: 83
End: 2018-09-07

## 2018-09-07 VITALS
TEMPERATURE: 98.2 F | WEIGHT: 176.8 LBS | BODY MASS INDEX: 29.46 KG/M2 | OXYGEN SATURATION: 95 % | SYSTOLIC BLOOD PRESSURE: 154 MMHG | RESPIRATION RATE: 16 BRPM | DIASTOLIC BLOOD PRESSURE: 86 MMHG | HEART RATE: 66 BPM | HEIGHT: 65 IN

## 2018-09-07 DIAGNOSIS — M85.89 OSTEOPENIA OF MULTIPLE SITES: Primary | ICD-10-CM

## 2018-09-07 DIAGNOSIS — R03.0 WHITE COAT SYNDROME WITHOUT HYPERTENSION: ICD-10-CM

## 2018-09-07 RX ORDER — ZOSTER VACCINE RECOMBINANT, ADJUVANTED 50 MCG/0.5
KIT INTRAMUSCULAR
Refills: 1 | COMMUNITY
Start: 2018-08-03 | End: 2018-10-05

## 2018-09-07 NOTE — PROGRESS NOTES
Soy Combs is a [de-identified] y.o. female who was seen in clinic today (9/7/2018). Assessment & Plan:   Diagnoses and all orders for this visit:    1. Osteopenia of multiple sites- did not tolerate Fosamax, d/w her DEXA results, reviewed trying a different bisphosphonate but will defer. Reviewed Ca and vitamin D. Reviewed weight bearing exercises. No changes. 2. White coat syndrome without hypertension- new dx, she reports amb BP monitoring is 120-130's/80's. Follow-up Disposition:  Return if symptoms worsen or fail to improve. Subjective:   Danica was seen today for Osteoporosis    Endocrine Review  She is seen for osteopenia. Since last visit: was started on Fosamax after abnormal DEXA. She took it for 6 weeks and was miserable. She reports constipation, myalgias and arthralgias. She stopped medication and symptoms resolved. She brought in labs from 2016 (vitamin D of 20 - 5/23/16)      Brief Labs:   No results found for: NA, K, CREA, CREATEXT, CHOL, HDL, LDLC, LDL, LDLCEXT, TRIGL, HBA1C, KWE3AVLI, TSH, TZN0OHJE, TSHEXT       Prior to Admission medications    Medication Sig Start Date End Date Taking? Authorizing Provider   [facility], PF, 50 mcg/0.5 mL susr injection LOT [id number] EXP 11/15/20 8/3/18  Yes Historical Provider   LUMIGAN 0.01 % ophthalmic drops  2/8/18  Yes Historical Provider   ESTRACE 0.01 % (0.1 mg/gram) vaginal cream  6/27/17  Yes Historical Provider   cholecalciferol (VITAMIN D3) 1,000 unit tablet Take  by mouth daily. Yes Historical Provider   CALCIUM CARBONATE/VITAMIN D3 (CALCIUM + D PO) Take  by mouth daily. Yes Historical Provider   CARBOXYMETHYLCELLULOSE SODIUM (THERA TEARS OP) Apply  to eye four (4) times daily. Yes Historical Provider   alendronate (FOSAMAX) 35 mg tablet Take 1 Tab by mouth every seven (7) days. 6/1/18   Patricia Warren MD          No Known Allergies        Review of Systems   Respiratory: Negative for cough and shortness of breath. Cardiovascular: Negative for chest pain and palpitations. Gastrointestinal: Negative for abdominal pain, constipation, diarrhea, nausea and vomiting. Objective:   Physical Exam   Constitutional: No distress. Cardiovascular: Regular rhythm and normal heart sounds. No murmur heard. Pulmonary/Chest: Effort normal. She has no wheezes. She has no rales. Psychiatric: She has a normal mood and affect. Her behavior is normal.         Visit Vitals    /86 (BP 1 Location: Right arm, BP Patient Position: Sitting)    Pulse 66    Temp 98.2 °F (36.8 °C) (Oral)    Resp 16    Ht 5' 5.1\" (1.654 m)    Wt 176 lb 12.8 oz (80.2 kg)    SpO2 95%    BMI 29.33 kg/m2         Disclaimer:  Advised her to call back or return to office if symptoms worsen/change/persist.  Discussed expected course/resolution/complications of diagnosis in detail with patient. Medication risks/benefits/costs/interactions/alternatives discussed with patient. She was given an after visit summary which includes diagnoses, current medications, & vitals. She expressed understanding with the diagnosis and plan. Aspects of this note may have been generated using voice recognition software. Despite editing, there may be some syntax errors.        Santiago Carmen MD

## 2018-09-07 NOTE — PATIENT INSTRUCTIONS
Checking your blood pressure at home: Your blood pressure was either borderline or to high. Please check your blood pressure 1 times per week. [place] BP is 130's/80's. Your BP should be under 140 for the top number and 90 for the bottom number. Please update me either by calling the office (phone number: [phone]) or you can use [employer].

## 2018-09-07 NOTE — MR AVS SNAPSHOT
[address] [facility] [address] [place] [address] 
[phone] Patient: Trenton Jimenez MRN: [id number] ZRT:7/72/[id number] Visit Information Date & Time Provider Department Dept. Phone Encounter #  
 9/7/2018  2:00 PM Toyin Byrd, [facility] Internal Medicine [phone] [id number] Follow-up Instructions Return if symptoms worsen or fail to improve. Your Appointments 5/17/2019  2:30 PM  
PHYSICAL with Toyin Byrd MD  
[facility] Internal Medicine [address] [facility]) Appt Note: CPE (5.16.18) [address] [facility] [address] [facility] [address]  
[address] [id number] [facility] [address] [place] [address] Upcoming Health Maintenance Date Due Influenza Age 5 to Adult 8/1/2018 BREAST CANCER SCRN MAMMOGRAM 3/7/2019 MEDICARE YEARLY EXAM 5/17/2019 GLAUCOMA SCREENING Q2Y 11/6/2019 COLONOSCOPY 6/2/2020 DTaP/Tdap/Td series (2 - Td) 5/31/2028 Allergies as of 9/7/2018  Review Complete On: 9/7/2018 By: Toyin Byrd MD  
 No Known Allergies Current Immunizations  Reviewed on 6/1/2018 Name Date Influenza Vaccine 10/18/2017, 11/11/2015, 12/19/2014 Pneumococcal Conjugate (PCV-13) 9/16/2015 Pneumococcal Polysaccharide (PPSV-23) 11/27/2007 Tdap 5/31/2018 Zoster Vaccine, Live 11/1/2015 Not reviewed this visit You Were Diagnosed With   
  
 Codes Comments Osteopenia of multiple sites    -  Primary ICD-10-CM: M85.89 ICD-9-CM: 733.90 White coat syndrome without hypertension     ICD-10-CM: R03.0 ICD-9-CM: 796.2 Vitals BP Pulse Temp Resp Height(growth percentile) Weight(growth percentile) 154/86 (BP 1 Location: Right arm, BP Patient Position: Sitting) 66 98.2 °F (36.8 °C) (Oral) 16 5' 5.1\" (1.654 m) 176 lb 12.8 oz (80.2 kg) SpO2 BMI OB Status Smoking Status 95% 29.33 kg/m2 Hysterectomy Former Smoker Vitals History BMI and BSA Data Body Mass Index Body Surface Area  
 29.33 kg/m 2 1.92 m 2 Preferred Pharmacy Pharmacy Name Phone [facility], [address] [facility] [place] [phone] Your Updated Medication List  
  
   
This list is accurate as of 9/7/18  2:40 PM.  Always use your most recent med list.  
  
  
  
  
 CALCIUM + D PO Take  by mouth daily. ESTRACE 0.01 % (0.1 mg/gram) vaginal cream  
Generic drug:  estradiol LUMIGAN 0.01 % ophthalmic drops Generic drug:  bimatoprost  
  
 SHINGRIX (PF) 50 mcg/0.5 mL Susr injection Generic drug:  varicella-zoster recombinant (PF)  
LOT [id number] EXP 11/15/20 [address] Apply  to eye four (4) times daily. VITAMIN D3 1,000 unit tablet Generic drug:  cholecalciferol Take  by mouth daily. Follow-up Instructions Return if symptoms worsen or fail to improve. Patient Instructions Checking your blood pressure at home: Your blood pressure was either borderline or to high. Please check your blood pressure 1 times per week. [place] BP is 130's/80's. Your BP should be under 140 for the top number and 90 for the bottom number. Please update me either by calling the office (phone number: [phone]) or you can use [employer] Computing. Introducing [facility] SERVICES! Cristina Sinclair introduces [employer] patient portal. Now you can access parts of your medical record, email your doctor's office, and request medication refills online. 1. In your internet browser, go to [email or web address] 2. Click on the First Time User? Click Here link in the Sign In box. You will see the New Member Sign Up page. 3. Enter your [employer] Computing Access Code exactly as it appears below. You will not need to use this code after youve completed the sign-up process. If you do not sign up before the expiration date, you must request a new code. · [employer] Access Code: [email or web address] Expires: 12/6/2018  2:40 PM 
 
4. Enter the last four digits of your Social Security Number (xxxx) and Date of Birth (mm/dd/yyyy) as indicated and click Submit. You will be taken to the next sign-up page. 5. Create a WeBe Works ID. This will be your WeBe Works login ID and cannot be changed, so think of one that is secure and easy to remember. 6. Create a WeBe Works password. You can change your password at any time. 7. Enter your Password Reset Question and Answer. This can be used at a later time if you forget your password. 8. Enter your e-mail address. You will receive e-mail notification when new information is available in [address] 9. Click Sign Up. You can now view and download portions of your medical record. 10. Click the Download Summary menu link to download a portable copy of your medical information. If you have questions, please visit the Frequently Asked Questions section of the WeBe Works website. Remember, [employer] Works is NOT to be used for urgent needs. For medical emergencies, dial 911. Now available from your iPhone and Android! Please provide this summary of care documentation to your next provider. Your primary care clinician is listed as Alysa Baugh. If you have any questions after today's visit, please call [phone].

## 2018-10-05 ENCOUNTER — OFFICE VISIT (OUTPATIENT)
Dept: INTERNAL MEDICINE CLINIC | Age: 83
End: 2018-10-05

## 2018-10-05 VITALS
RESPIRATION RATE: 16 BRPM | TEMPERATURE: 97.6 F | WEIGHT: 177 LBS | HEART RATE: 74 BPM | BODY MASS INDEX: 29.49 KG/M2 | HEIGHT: 65 IN | OXYGEN SATURATION: 96 % | DIASTOLIC BLOOD PRESSURE: 92 MMHG | SYSTOLIC BLOOD PRESSURE: 164 MMHG

## 2018-10-05 DIAGNOSIS — R03.0 WHITE COAT SYNDROME WITHOUT HYPERTENSION: Primary | ICD-10-CM

## 2018-10-05 RX ORDER — AMLODIPINE BESYLATE 5 MG/1
5 TABLET ORAL DAILY
Qty: 30 TAB | Refills: 0 | Status: SHIPPED | OUTPATIENT
Start: 2018-10-05 | End: 2018-10-31 | Stop reason: SDUPTHER

## 2018-10-05 NOTE — PATIENT INSTRUCTIONS
Checking your blood pressure at home: Your blood pressure was either borderline or to high. Please check your blood pressure 2-3 times per week. [place] BP is 120/80. Your BP should be under 140 for the top number and 90 for the bottom number. Please update me either by calling the office (phone number: [phone]) or you can use [employer].

## 2018-10-05 NOTE — MR AVS SNAPSHOT
[address] [facility] [address] [place] [address] 
[phone] Patient: Yfn Ely MRN: [id number] CDF:[birth year] Visit Information Date & Time Provider Department Dept. Phone Encounter #  
 10/5/2018 10:15 AM Carmen Samuels [address] [place] [address] Internal Medicine [phone] [id number] Follow-up Instructions Return if symptoms worsen or fail to improve. Your Appointments 5/17/2019  2:30 PM  
PHYSICAL with Carmen Samuels MD  
[facility] Internal Medicine [facility]) Appt Note: CPE (5.16.18) [address] [facility] [address] [facility] [address]  
[id number] [address] Upcoming Health Maintenance Date Due Shingrix Vaccine Age 50> (1 of 2) 9/30/1987 Influenza Age 5 to Adult 8/1/2018 BREAST CANCER SCRN MAMMOGRAM 3/7/2019 MEDICARE YEARLY EXAM 5/17/2019 GLAUCOMA SCREENING Q2Y 11/6/2019 COLONOSCOPY 6/2/2020 DTaP/Tdap/Td series (2 - Td) 5/31/2028 Allergies as of 10/5/2018  Review Complete On: 10/5/2018 By: Camren Samuels MD  
 No Known Allergies Current Immunizations  Reviewed on 10/5/2018 Name Date Influenza Vaccine 10/18/2017, 11/11/2015, 12/19/2014 Pneumococcal Conjugate (PCV-13) 9/16/2015 Pneumococcal Polysaccharide (PPSV-23) 11/27/2007 Tdap 5/31/2018 Zoster Vaccine, Live 11/1/2015 Reviewed by Irais Coffman RN on 10/5/2018 at 10:29 AM  
You Were Diagnosed With   
  
 Codes Comments White coat syndrome without hypertension    -  Primary ICD-10-CM: R03.0 ICD-9-CM: 796.2 Vitals BP Pulse Temp Resp Height(growth percentile) Weight(growth percentile) (!) 164/92 74 97.6 °F (36.4 °C) (Oral) 16 5' 5.1\" (1.654 m) 177 lb (80.3 kg) SpO2 BMI OB Status Smoking Status 96% 29.36 kg/m2 Hysterectomy Former Smoker BMI and BSA Data Body Mass Index Body Surface Area 29.36 kg/m 2 1.92 m 2 Preferred Pharmacy Pharmacy Name Phone [facility], [address] [facility] [phone] Your Updated Medication List  
  
   
This list is accurate as of 10/5/18 11:00 AM.  Always use your most recent med list. amLODIPine 5 mg tablet Commonly known as:  Pasty Pall Take 1 Tab by mouth daily. CALCIUM + D PO Take  by mouth daily. ESTRACE 0.01 % (0.1 mg/gram) vaginal cream  
Generic drug:  estradiol LUMIGAN 0.01 % ophthalmic drops Generic drug:  bimatoprost  
  
 THERA TEARS OP Apply  to eye four (4) times daily. VITAMIN D3 1,000 unit tablet Generic drug:  cholecalciferol Take  by mouth daily. Prescriptions Sent to Pharmacy Refills  
 amLODIPine (NORVASC) 5 mg tablet 0 Sig: Take 1 Tab by mouth daily. Class: Normal  
 Pharmacy: [facility], [address] [place] [address] #: [phone] Route: Oral  
  
Follow-up Instructions Return if symptoms worsen or fail to improve. Patient Instructions Checking your blood pressure at home: Your blood pressure was either borderline or to high. Please check your blood pressure 2-3 times per week. [place] BP is 120/80. Your BP should be under 140 for the top number and 90 for the bottom number. Please update me either by calling the office (phone number: [phone]) or you can use Secret Space. Introducing [facility] & HEALTH SERVICES! [facility] introduces [employer] patient portal. Now you can access parts of your medical record, email your doctor's office, and request medication refills online. 1. In your internet browser, go to [email or web address] 2. Click on the First Time User? Click Here link in the Sign In box. You will see the New Member Sign Up page. 3. Enter your Secret Space Access Code exactly as it appears below.  You will not need to use this code after youve completed the sign-up process. If you do not sign up before the expiration date, you must request a new code. · Sonocine Access Code: [email or web address] Expires: 12/6/2018  2:40 PM 
 
4. Enter the last four digits of your Social Security Number (xxxx) and Date of Birth (mm/dd/yyyy) as indicated and click Submit. You will be taken to the next sign-up page. 5. Create a Sonocine ID. This will be your Sonocine login ID and cannot be changed, so think of one that is secure and easy to remember. 6. Create a Sonocine password. You can change your password at any time. 7. Enter your Password Reset Question and Answer. This can be used at a later time if you forget your password. 8. Enter your e-mail address. You will receive e-mail notification when new information is available in [address] 9. Click Sign Up. You can now view and download portions of your medical record. 10. Click the Download Summary menu link to download a portable copy of your medical information. If you have questions, please visit the Frequently Asked Questions section of the Sonocine website. Remember, Son[employer] is NOT to be used for urgent needs. For medical emergencies, dial 911. Now available from your iPhone and Android! Please provide this summary of care documentation to your next provider. Your primary care clinician is listed as Carmen Samuels. If you have any questions after today's visit, please call [phone].

## 2018-10-05 NOTE — PROGRESS NOTES
Nithya Farrar is a 82 y.o. female who was seen in clinic today (10/5/2018). Assessment & Plan:   Diagnoses and all orders for this visit:    1. White coat syndrome with hypertension- this is a new problem, we reviewed her readings and discussed ideal BP levels, differential dx reviewed with the patient, and favor this is truly hypertension. Reviewed med options. She will start on meds below. See AVS   -     amLODIPine (NORVASC) 5 mg tablet; Take 1 Tab by mouth daily. Follow-up Disposition:  Return if symptoms worsen or fail to improve. Subjective:   Danica was seen today for Blood Pressure Check    Cardiovascular Review  The patient has white coat HTN. Since last visit: she has been checking BP daily. She RTC with her diary for review. She is averaging 150-160's/80's. She did start exercising and noticed some improvement in the BP. Diet and Lifestyle: generally follows a low fat low cholesterol diet, generally follows a low sodium diet. Labs: reviewed and discussed with patient. Brief Labs:   No results found for: NA, K, CREA, CREATEXT, CHOL, HDL, LDLC, LDL, LDLCEXT, TRIGL, HBA1C, [id number], TSH, [id number], TSHEXT       Prior to Admission medications    Medication Sig Start Date End Date Taking? Authorizing Provider   LUMIGAN 0.01 % ophthalmic drops  2/8/18  Yes Historical Provider   ESTRACE 0.01 % (0.1 mg/gram) vaginal cream  6/27/17  Yes Historical Provider   cholecalciferol (VITAMIN D3) 1,000 unit tablet Take  by mouth daily. Yes Historical Provider   CALCIUM CARBONATE/VITAMIN D3 (CALCIUM + D PO) Take  by mouth daily. Yes Historical Provider   CARBOXYMETHYLCELLULOSE SODIUM (THERA TEARS OP) Apply  to eye four (4) times daily. Yes Historical Provider          No Known Allergies        Review of Systems   Respiratory: Negative for cough and shortness of breath. Cardiovascular: Negative for chest pain and palpitations.    Gastrointestinal: Negative for abdominal pain, constipation, diarrhea, nausea and vomiting. Objective:   Physical Exam   Constitutional: No distress. Cardiovascular: Regular rhythm and normal heart sounds. Pulmonary/Chest: Effort normal and breath sounds normal. She has no wheezes. She has no rales. Musculoskeletal: She exhibits no edema. Psychiatric: She has a normal mood and affect. Her behavior is normal.         Visit Vitals    BP (!) 164/92    Pulse 74    Temp 97.6 °F (36.4 °C) (Oral)    Resp 16    Ht 5' 5.1\" (1.654 m)    Wt 177 lb (80.3 kg)    SpO2 96%    BMI 29.36 kg/m2         Disclaimer:  Advised her to call back or return to office if symptoms worsen/change/persist.  Discussed expected course/resolution/complications of diagnosis in detail with patient. Medication risks/benefits/costs/interactions/alternatives discussed with patient. She was given an after visit summary which includes diagnoses, current medications, & vitals. She expressed understanding with the diagnosis and plan. Aspects of this note may have been generated using voice recognition software. Despite editing, there may be some syntax errors.        Nanette Wesley MD

## 2018-10-30 ENCOUNTER — TELEPHONE (OUTPATIENT)
Dept: INTERNAL MEDICINE CLINIC | Age: 83
End: 2018-10-30

## 2018-10-30 DIAGNOSIS — R03.0 WHITE COAT SYNDROME WITHOUT HYPERTENSION: ICD-10-CM

## 2018-10-31 RX ORDER — AMLODIPINE BESYLATE 5 MG/1
5 TABLET ORAL DAILY
Qty: 90 TAB | Refills: 1 | Status: SHIPPED | OUTPATIENT
Start: 2018-10-31 | End: 2019-02-18 | Stop reason: SDUPTHER

## 2018-10-31 NOTE — TELEPHONE ENCOUNTER
Spoke with patient using 2 IDs, name and . Patient notified per Dr Christin Fleischer review of BP recordings: Looks good. No changes.   Patient verbalized understanding and requests Amlodipine refill    Last OV: 10-5-18  Next visit: 19  Last CBC and BMP:  16    Refill request forwarded to provider for approval

## 2019-01-23 ENCOUNTER — OFFICE VISIT (OUTPATIENT)
Dept: INTERNAL MEDICINE CLINIC | Age: 84
End: 2019-01-23

## 2019-01-23 VITALS
TEMPERATURE: 97.8 F | BODY MASS INDEX: 29.16 KG/M2 | HEIGHT: 65 IN | DIASTOLIC BLOOD PRESSURE: 92 MMHG | WEIGHT: 175 LBS | RESPIRATION RATE: 16 BRPM | HEART RATE: 83 BPM | SYSTOLIC BLOOD PRESSURE: 160 MMHG | OXYGEN SATURATION: 97 %

## 2019-01-23 DIAGNOSIS — I10 WHITE COAT SYNDROME WITH HYPERTENSION: ICD-10-CM

## 2019-01-23 DIAGNOSIS — K59.00 CONSTIPATION, UNSPECIFIED CONSTIPATION TYPE: Primary | ICD-10-CM

## 2019-01-23 RX ORDER — LISINOPRIL 10 MG/1
10 TABLET ORAL DAILY
Qty: 30 TAB | Refills: 0 | Status: CANCELLED | OUTPATIENT
Start: 2019-01-23

## 2019-01-23 NOTE — PROGRESS NOTES
Savana Castellano is a 82 y.o. female who was seen in clinic today (1/23/2019) for an acute visit. Assessment & Plan:   Diagnoses and all orders for this visit:    1. Constipation, unspecified constipation type- this is a new problem, she is symptomatic, differential dx reviewed with the patient, exact etiology is unclear at this time. Since her stool is heme + and this is an acute change will get imaging set up. Did review possibility that this could be amlodipine related. Will hold off making any BP med changes until imaging reviewed. Red flags were reviewed with the patient to RTC or notify me.     -     CT ABD PELV W CONT; Future    2. White coat syndrome with hypertension- has never had BP cuff calibrated, will get CT scan set up for tomorrow and then have her RTC in 2 days to review results and calibrate cuff. Continue current dose of med at this time. Follow-up Disposition:  Return if symptoms worsen or fail to improve. Subjective:   Danica was seen today for Constipation    GI Review  Patient complains of constipation. This has been present for about 6 weeks ago, unchanged since that time. She reports normally having 1 BM every morning, has always been regular. She thinks last BM was >5 days ago. She is having some abdominal cramping & lower back cramping. No urgency to defect. No nausea or vomiting. No diet changes, except has cut down on coffee. Only new medication is the amlodipine that was started in October. She has tried a glycerine suppository, OTC stool softner, and a laxative. Last c-scope was in 205 and had an adenoma and diverticulosis. Prior to Admission medications    Medication Sig Start Date End Date Taking? Authorizing Provider   amLODIPine (NORVASC) 5 mg tablet Take 1 Tab by mouth daily.  10/31/18  Yes Ced Bar MD   LUMIGAN 0.01 % ophthalmic drops  2/8/18  Yes Provider, Historical   ESTRACE 0.01 % (0.1 mg/gram) vaginal cream  6/27/17 Yes Provider, Historical   cholecalciferol (VITAMIN D3) 1,000 unit tablet Take  by mouth daily. Yes Provider, Historical   CALCIUM CARBONATE/VITAMIN D3 (CALCIUM + D PO) Take  by mouth daily. Yes Provider, Historical   CARBOXYMETHYLCELLULOSE SODIUM (THERA TEARS OP) Apply  to eye four (4) times daily. Yes Provider, Historical          No Known Allergies        Review of Systems   Constitutional: Negative for malaise/fatigue and weight loss. Respiratory: Negative for cough and shortness of breath. Cardiovascular: Negative for chest pain and palpitations. Gastrointestinal: Positive for constipation. Negative for abdominal pain, blood in stool, diarrhea, melena, nausea and vomiting. Objective:   Physical Exam   Cardiovascular: Regular rhythm and normal heart sounds. No murmur heard. Pulmonary/Chest: Effort normal and breath sounds normal. She has no wheezes. She has no rales. Abdominal: Bowel sounds are normal. She exhibits no mass. There is no hepatosplenomegaly. There is no tenderness. Chaperone: Raz Valdes LPN    Genitourinary: Rectal exam shows guaiac positive stool. Rectal exam shows no external hemorrhoid, no internal hemorrhoid and no tenderness. Genitourinary Comments: + hard stool in the rectal vault         Visit Vitals  BP (!) 160/92 (BP 1 Location: Left arm, BP Patient Position: Sitting)   Pulse 83   Temp 97.8 °F (36.6 °C) (Oral)   Resp 16   Ht 5' 5.1\" (1.654 m)   Wt 175 lb (79.4 kg)   SpO2 97%   BMI 29.03 kg/m²         Disclaimer:  Advised her to call back or return to office if symptoms worsen/change/persist.  Discussed expected course/resolution/complications of diagnosis in detail with patient. Medication risks/benefits/costs/interactions/alternatives discussed with patient. She was given an after visit summary which includes diagnoses, current medications, & vitals. She expressed understanding with the diagnosis and plan.       Aspects of this note may have been generated using voice recognition software. Despite editing, there may be some syntax errors.        Emeterio Ochoa MD

## 2019-01-23 NOTE — PROGRESS NOTES
Chief Complaint   Patient presents with    Constipation     Patient states she is here for constipation. Patient states she is not sure how long she has been constipated but has tried OTC glycerin suppositories, stool softener and last night took a laxative and she still hasn't had a bowel movement.

## 2019-01-24 ENCOUNTER — HOSPITAL ENCOUNTER (OUTPATIENT)
Dept: CT IMAGING | Age: 84
Discharge: HOME | End: 2019-01-24
Attending: INTERNAL MEDICINE
Payer: MEDICARE

## 2019-01-24 DIAGNOSIS — K59.00 CONSTIPATION, UNSPECIFIED CONSTIPATION TYPE: ICD-10-CM

## 2019-01-24 LAB — CREAT BLD-MCNC: 0.9 MG/DL (ref 0.6–1.3)

## 2019-01-24 PROCEDURE — 74011636320 HC RX REV CODE- 636/320: Performed by: INTERNAL MEDICINE

## 2019-01-24 PROCEDURE — 74177 CT ABD & PELVIS W/CONTRAST: CPT

## 2019-01-24 PROCEDURE — 74011000255 HC RX REV CODE- 255: Performed by: INTERNAL MEDICINE

## 2019-01-24 PROCEDURE — 82565 ASSAY OF CREATININE: CPT

## 2019-01-24 PROCEDURE — 74011250636 HC RX REV CODE- 250/636: Performed by: INTERNAL MEDICINE

## 2019-01-24 RX ORDER — BARIUM SULFATE 20 MG/ML
900 SUSPENSION ORAL ONCE
Status: COMPLETED | OUTPATIENT
Start: 2019-01-24 | End: 2019-01-24

## 2019-01-24 RX ORDER — SODIUM CHLORIDE 9 MG/ML
50 INJECTION, SOLUTION INTRAVENOUS ONCE
Status: COMPLETED | OUTPATIENT
Start: 2019-01-24 | End: 2019-01-24

## 2019-01-24 RX ORDER — SODIUM CHLORIDE 0.9 % (FLUSH) 0.9 %
10 SYRINGE (ML) INJECTION ONCE
Status: COMPLETED | OUTPATIENT
Start: 2019-01-24 | End: 2019-01-24

## 2019-01-24 RX ADMIN — IOPAMIDOL 100 ML: 755 INJECTION, SOLUTION INTRAVENOUS at 09:01

## 2019-01-24 RX ADMIN — SODIUM CHLORIDE 50 ML/HR: 900 INJECTION, SOLUTION INTRAVENOUS at 09:02

## 2019-01-24 RX ADMIN — BARIUM SULFATE 900 ML: 20 SUSPENSION ORAL at 09:01

## 2019-01-24 RX ADMIN — Medication 10 ML: at 09:02

## 2019-01-25 ENCOUNTER — CLINICAL SUPPORT (OUTPATIENT)
Dept: INTERNAL MEDICINE CLINIC | Age: 84
End: 2019-01-25

## 2019-01-25 VITALS — DIASTOLIC BLOOD PRESSURE: 80 MMHG | SYSTOLIC BLOOD PRESSURE: 138 MMHG

## 2019-01-25 DIAGNOSIS — I10 WHITE COAT SYNDROME WITH HYPERTENSION: Primary | ICD-10-CM

## 2019-01-25 DIAGNOSIS — K59.00 CONSTIPATION, UNSPECIFIED CONSTIPATION TYPE: ICD-10-CM

## 2019-01-25 NOTE — PROGRESS NOTES
She RTC for BP cuff calibration today. She has a h/o white coat syndrome w/ HTN. Nursing note reviewed/VS reviewed. Cuff accurate. Pt was seen 2 days ago for constipation. There were some abnormal features so CT scan obtained yesterday. This was w/o abnormalities. Results reviewed with her. Carol Ann Tristan this is more medication related. However, on further questioning she reports having issues w/ constipation 6-9 months ago that was attributed to Fosamax. She is not sure this has been new in the last few weeks. Will continue on current meds. She will schedule Mirilax daily.

## 2019-02-18 ENCOUNTER — TELEPHONE (OUTPATIENT)
Dept: INTERNAL MEDICINE CLINIC | Age: 84
End: 2019-02-18

## 2019-02-18 DIAGNOSIS — R03.0 WHITE COAT SYNDROME WITHOUT HYPERTENSION: ICD-10-CM

## 2019-02-18 RX ORDER — AMLODIPINE BESYLATE 5 MG/1
5 TABLET ORAL DAILY
Qty: 90 TAB | Refills: 0 | Status: SHIPPED | COMMUNITY
Start: 2019-02-18 | End: 2019-05-02 | Stop reason: SDUPTHER

## 2019-02-18 RX ORDER — AMLODIPINE BESYLATE 5 MG/1
5 TABLET ORAL DAILY
Qty: 90 TAB | Refills: 0 | Status: SHIPPED | OUTPATIENT
Start: 2019-02-18 | End: 2019-02-18 | Stop reason: SDUPTHER

## 2019-04-11 ENCOUNTER — HOSPITAL ENCOUNTER (OUTPATIENT)
Dept: LAB | Age: 84
Discharge: HOME | End: 2019-04-11
Payer: MEDICARE

## 2019-04-11 ENCOUNTER — OFFICE VISIT (OUTPATIENT)
Dept: INTERNAL MEDICINE CLINIC | Age: 84
End: 2019-04-11

## 2019-04-11 ENCOUNTER — TELEPHONE (OUTPATIENT)
Dept: INTERNAL MEDICINE CLINIC | Age: 84
End: 2019-04-11

## 2019-04-11 DIAGNOSIS — R53.83 OTHER FATIGUE: Primary | ICD-10-CM

## 2019-04-11 DIAGNOSIS — R00.2 PALPITATION: ICD-10-CM

## 2019-04-11 DIAGNOSIS — R94.31 ABNORMAL ECG: ICD-10-CM

## 2019-04-11 PROCEDURE — 85025 COMPLETE CBC W/AUTO DIFF WBC: CPT

## 2019-04-11 PROCEDURE — 80053 COMPREHEN METABOLIC PANEL: CPT

## 2019-04-11 PROCEDURE — 84443 ASSAY THYROID STIM HORMONE: CPT

## 2019-04-11 PROCEDURE — 36415 COLL VENOUS BLD VENIPUNCTURE: CPT

## 2019-04-11 NOTE — PROGRESS NOTES
HISTORY OF PRESENT ILLNESS  Jeferson Betancourt is a 82 y.o. female. Fatigue   The history is provided by the patient. This is a new problem. Episode onset: 7 d. The problem has not changed since onset. Pertinent negatives include no chest pain and no shortness of breath. Nothing aggravates the symptoms. The symptoms are relieved by rest.   Palpitations    The history is provided by the patient. Chronicity: unknown- detected at BP check at pharmacy. Of note, was seen on ecg 2013. The problem is associated with stress. Associated symptoms include malaise/fatigue. Pertinent negatives include no chest pain, no chest pressure, no exertional chest pressure, no irregular heartbeat, no orthopnea, no lower extremity edema, no weakness and no shortness of breath. Risk factors include hypertension. Her past medical history is significant for hypertension. Her past medical history does not include heart disease, past MI or atrial fibrillation. Review of Systems   Constitutional: Positive for fatigue and malaise/fatigue. Respiratory: Negative for shortness of breath. Cardiovascular: Positive for palpitations. Negative for chest pain and orthopnea. Neurological: Negative for weakness. Physical Exam   Constitutional: She appears well-nourished. Neck: Carotid bruit is not present. No thyromegaly present. Cardiovascular: Normal rate, regular rhythm and intact distal pulses. Occasional extrasystoles are present. Exam reveals no gallop and no friction rub. No murmur heard. Pulmonary/Chest: Effort normal and breath sounds normal. No respiratory distress. Musculoskeletal: She exhibits no edema. Nursing note and vitals reviewed. ASSESSMENT and PLAN  Diagnoses and all orders for this visit:    1. Other fatigue  -     AMB POC EKG ROUTINE W/ 12 LEADS, INTER & REP  -     METABOLIC PANEL, COMPREHENSIVE  -     CBC WITH AUTOMATED DIFF  -     TSH 3RD GENERATION    2.  Palpitation  -     REFERRAL TO CARDIOLOGY  -     METABOLIC PANEL, COMPREHENSIVE  -     CBC WITH AUTOMATED DIFF  -     TSH 3RD GENERATION    3.  Abnormal ECG  -     REFERRAL TO CARDIOLOGY

## 2019-04-11 NOTE — TELEPHONE ENCOUNTER
Chikis Dixon called on behalf of patient, stating she came in to  medications and c/o fatigue and asked for BP check. Amanda/pharmacist states vitals are /74; HR 64, but heart is \"skipping beats\". No c/o shortness of breath or chest pain. Appt scheduled for evaluation with Dr. Fu Begin this afternoon at 3:25pm as Dr. Yumi Miramontes not in the office this afternoon, per Dr. Yumi Miramontes patient needs an EKG and possible referral to cardiology. Red flags reviewed with patient and she will report to ER if they should arise.

## 2019-04-12 ENCOUNTER — OFFICE VISIT (OUTPATIENT)
Dept: CARDIOLOGY CLINIC | Age: 84
End: 2019-04-12

## 2019-04-12 VITALS
SYSTOLIC BLOOD PRESSURE: 125 MMHG | BODY MASS INDEX: 29.39 KG/M2 | DIASTOLIC BLOOD PRESSURE: 72 MMHG | WEIGHT: 176.4 LBS | RESPIRATION RATE: 16 BRPM | HEART RATE: 64 BPM | HEIGHT: 65 IN | OXYGEN SATURATION: 96 %

## 2019-04-12 DIAGNOSIS — R94.31 ABNORMAL EKG: ICD-10-CM

## 2019-04-12 DIAGNOSIS — I49.1 PAC (PREMATURE ATRIAL CONTRACTION): Primary | ICD-10-CM

## 2019-04-12 DIAGNOSIS — I10 BENIGN ESSENTIAL HTN: ICD-10-CM

## 2019-04-12 LAB
ALBUMIN SERPL-MCNC: 4.6 G/DL (ref 3.5–4.7)
ALBUMIN/GLOB SERPL: 1.7 {RATIO} (ref 1.2–2.2)
ALP SERPL-CCNC: 75 IU/L (ref 39–117)
ALT SERPL-CCNC: 18 IU/L (ref 0–32)
AST SERPL-CCNC: 20 IU/L (ref 0–40)
BASOPHILS # BLD AUTO: 0 X10E3/UL (ref 0–0.2)
BASOPHILS NFR BLD AUTO: 0 %
BILIRUB SERPL-MCNC: 0.4 MG/DL (ref 0–1.2)
BUN SERPL-MCNC: 19 MG/DL (ref 8–27)
BUN/CREAT SERPL: 23 (ref 12–28)
CALCIUM SERPL-MCNC: 10.2 MG/DL (ref 8.7–10.3)
CHLORIDE SERPL-SCNC: 101 MMOL/L (ref 96–106)
CO2 SERPL-SCNC: 25 MMOL/L (ref 20–29)
CREAT SERPL-MCNC: 0.83 MG/DL (ref 0.57–1)
EOSINOPHIL # BLD AUTO: 0.1 X10E3/UL (ref 0–0.4)
EOSINOPHIL NFR BLD AUTO: 2 %
ERYTHROCYTE [DISTWIDTH] IN BLOOD BY AUTOMATED COUNT: 13 % (ref 12.3–15.4)
GLOBULIN SER CALC-MCNC: 2.7 G/DL (ref 1.5–4.5)
GLUCOSE SERPL-MCNC: 95 MG/DL (ref 65–99)
HCT VFR BLD AUTO: 44 % (ref 34–46.6)
HGB BLD-MCNC: 14.6 G/DL (ref 11.1–15.9)
IMM GRANULOCYTES # BLD AUTO: 0 X10E3/UL (ref 0–0.1)
IMM GRANULOCYTES NFR BLD AUTO: 0 %
LYMPHOCYTES # BLD AUTO: 1.6 X10E3/UL (ref 0.7–3.1)
LYMPHOCYTES NFR BLD AUTO: 22 %
MCH RBC QN AUTO: 31.3 PG (ref 26.6–33)
MCHC RBC AUTO-ENTMCNC: 33.2 G/DL (ref 31.5–35.7)
MCV RBC AUTO: 94 FL (ref 79–97)
MONOCYTES # BLD AUTO: 0.7 X10E3/UL (ref 0.1–0.9)
MONOCYTES NFR BLD AUTO: 9 %
NEUTROPHILS # BLD AUTO: 5 X10E3/UL (ref 1.4–7)
NEUTROPHILS NFR BLD AUTO: 67 %
PLATELET # BLD AUTO: 337 X10E3/UL (ref 150–379)
POTASSIUM SERPL-SCNC: 5.1 MMOL/L (ref 3.5–5.2)
PROT SERPL-MCNC: 7.3 G/DL (ref 6–8.5)
RBC # BLD AUTO: 4.67 X10E6/UL (ref 3.77–5.28)
SODIUM SERPL-SCNC: 140 MMOL/L (ref 134–144)
TSH SERPL DL<=0.005 MIU/L-ACNC: 3.02 UIU/ML (ref 0.45–4.5)
WBC # BLD AUTO: 7.5 X10E3/UL (ref 3.4–10.8)

## 2019-04-12 NOTE — PROGRESS NOTES
[address] [address]:   [phone]    History of Present Illness:   Ms. Madai Monsalve is an 81 yo F with a history of ovarian surgery in remission in 2002, history of tobacco use (quit in 1995), essential hypertension, referred by Dr. June Betancourt for cardiac evaluation. Her primary care physician is Dr. Hu Ayala. She went to the drug store to have her blood pressure checked and they noted at that time irregularity with her heartbeat. They subsequently had her see her primary care physician. There, they noted some irregularity on her EKG and they referred her here. From a symptom standpoint, she denies any exertional chest pain, shortness of breath, palpitations, lightheadedness, dizziness or syncope. She is active and goes to the gym a few times a week without any difficulty. She denies any prior cardiac history and no other prior cardiac testing. She is compensated on exam with clear lungs and no lower extremity edema. Her blood pressure is 125/72 with a heart rate of 64 bpm.  I reviewed personally her EKG from yesterday and it was normal sinus rhythm. There was a single premature atrial contraction. There was T-wave inversion in V4 and V5. I compared this to an EKG from 02/26/2013 and she did have a premature atrial contraction then as well. Soc hx. Quit tobacco 1995. Fam hx. No early CAD. Maternal cousins some CAD. Assessment and Plan:   1. Premature atrial contraction. This is benign and she is not having any symptoms with this. No additional cardiac evaluation is indicated at this time and reassured the patient. 2. Abnormal EKG. She has slight T-wave inversion in the lateral leads. She is active without any symptoms and no additional evaluation is indicated at this time. If she does develop cardiac symptoms in the future, could consider stress testing. Recommend routine followup with her primary care physician to address cardiac risk factors. 3. Essential hypertension.   Blood pressure is controlled. 4. Tobacco use. Quit many years ago. 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 All other systems negative except as above. PE  Vitals:    04/12/19 1423   BP: 125/72   Pulse: 64   Resp: 16   SpO2: 96%   Weight: 176 lb 6.4 oz (80 kg)   Height: 5' 5.1\" (1.654 m)    Body mass index is 29.26 kg/m².    General appearance - alert, well appearing, and in no distress  Mental status - affect appropriate to mood  Eyes - sclera anicteric, moist mucous membranes  Neck - supple, no JVD  Chest - clear to auscultation, no wheezes, rales or rhonchi  Heart - normal rate, regular rhythm, normal S1, S2, no murmurs, rubs, clicks or gallops  Abdomen - soft, nontender, nondistended, no masses or organomegaly  Neurological - no focal deficit  Extremities - peripheral pulses normal, no pedal edema      Recent Labs:  No results found for: CHOL, CHOLX, CHLST, CHOLV, 125915, HDL, LDL, LDLC, DLDLP, TGLX, TRIGL, TRIGP, CHHD, [address]  [facility]  Lab Results   Component Value Date/Time    Creatinine (POC) 0.9 01/24/2019 08:56 AM    Creatinine 0.83 04/11/2019 04:46 PM     Lab Results   Component Value Date/Time    BUN 19 04/11/2019 04:46 PM     Lab Results   Component Value Date/Time    Potassium 5.1 04/11/2019 04:46 PM     No results found for: HBA1C, HGBE8, XLJ6MNGJ, YLC3XROM  Lab Results   Component Value Date/Time    HGB 14.6 04/11/2019 04:46 PM     Lab Results   Component Value Date/Time    PLATELET 910 94/66/6333 04:46 PM       Reviewed:  Past Medical History:   Diagnosis Date    History of chemotherapy 2002    Taxol/carboplatin x 6    Mixed conductive and sensorineural hearing loss of both ears 5/16/2018    Ovarian cancer ([facility] [address] 2002    Seasonal allergic rhinitis 5/16/2018     Social History     Tobacco Use   Smoking Status Former Smoker Smokeless Tobacco Never Used     Social History     Substance and Sexual Activity   Alcohol Use Yes    Alcohol/week: 4.2 oz    Types: 7 Glasses of wine per week     No Known Allergies    Current Outpatient Medications   Medication Sig    amLODIPine (NORVASC) 5 mg tablet Take 1 Tab by mouth daily.  LUMIGAN 0.01 % ophthalmic drops     ESTRACE 0.01 % (0.1 mg/gram) vaginal cream     cholecalciferol (VITAMIN D3) 1,000 unit tablet Take  by mouth daily.  CALCIUM CARBONATE/VITAMIN D3 (CALCIUM + D PO) Take  by mouth daily.  CARBOXYMETHYLCELLULOSE SODIUM (THERA TEARS OP) Apply  to eye four (4) times daily. No current facility-administered medications for this visit.         Karthik Uriarte MD  [facility] [place]  [address] [facility] [address]  [address]

## 2019-05-02 DIAGNOSIS — R03.0 WHITE COAT SYNDROME WITHOUT HYPERTENSION: ICD-10-CM

## 2019-05-02 RX ORDER — AMLODIPINE BESYLATE 5 MG/1
TABLET ORAL
Qty: 90 TAB | Refills: 1 | Status: SHIPPED | OUTPATIENT
Start: 2019-05-02 | End: 2019-10-29 | Stop reason: SDUPTHER

## 2019-05-17 ENCOUNTER — OFFICE VISIT (OUTPATIENT)
Dept: INTERNAL MEDICINE CLINIC | Age: 84
End: 2019-05-17

## 2019-05-17 VITALS
TEMPERATURE: 97.6 F | HEIGHT: 66 IN | HEART RATE: 60 BPM | OXYGEN SATURATION: 96 % | SYSTOLIC BLOOD PRESSURE: 136 MMHG | RESPIRATION RATE: 14 BRPM | BODY MASS INDEX: 28.77 KG/M2 | DIASTOLIC BLOOD PRESSURE: 70 MMHG | WEIGHT: 179 LBS

## 2019-05-17 DIAGNOSIS — Z00.00 MEDICARE ANNUAL WELLNESS VISIT, SUBSEQUENT: Primary | ICD-10-CM

## 2019-05-17 DIAGNOSIS — C56.9 MALIGNANT NEOPLASM OF OVARY, UNSPECIFIED LATERALITY (HCC): ICD-10-CM

## 2019-05-17 DIAGNOSIS — I10 WHITE COAT SYNDROME WITH HYPERTENSION: ICD-10-CM

## 2019-05-17 DIAGNOSIS — Z13.31 SCREENING FOR DEPRESSION: ICD-10-CM

## 2019-05-17 DIAGNOSIS — R20.0 NUMBNESS AND TINGLING OF RIGHT ARM: ICD-10-CM

## 2019-05-17 DIAGNOSIS — R20.2 NUMBNESS AND TINGLING OF RIGHT ARM: ICD-10-CM

## 2019-05-17 DIAGNOSIS — Z13.39 SCREENING FOR ALCOHOLISM: ICD-10-CM

## 2019-05-17 DIAGNOSIS — M79.89 LEG SWELLING: ICD-10-CM

## 2019-05-17 DIAGNOSIS — M85.89 OSTEOPENIA OF MULTIPLE SITES: ICD-10-CM

## 2019-05-17 DIAGNOSIS — E66.3 OVERWEIGHT (BMI 25.0-29.9): ICD-10-CM

## 2019-05-17 DIAGNOSIS — Z71.89 ADVANCED CARE PLANNING/COUNSELING DISCUSSION: ICD-10-CM

## 2019-05-17 NOTE — ACP (ADVANCE CARE PLANNING)
Advance Care Planning (ACP) Provider Note - Comprehensive     Date of ACP Conversation: 05/17/19  Persons included in Conversation:  patient  Length of ACP Conversation in minutes: <16 minutes (Non-Billable)    Authorized Decision Maker (if patient is incapable of making informed decisions):   Healthcare Agent/Medical Power of  under Advance Directive      She has NO advanced directive  - add't info provided. Reviewed DNR/DNI and patient is interested. Will have her thinks about it. Forms reviewed and provided to her for reference. She will notify me if she wants to complete form. General ACP for ALL Patients with Decision Making Capacity:  Importance of advance care planning, including choosing a healthcare agent to communicate patient's healthcare decisions if patient lost the ability to make decisions, such as after a sudden illness or accident  Understanding of the healthcare agent role was assessed and information provided  Opportunity offered to explore how cultural, Anglican, spiritual, or personal beliefs would affect decisions for future care  Exploration of values, goals, and preferences if recovery is not expected, even with continued medical treatment in the event of: Imminent death or severe, permanent brain injury    For Serious or Chronic Illness:  Understanding of CPR, goals and expected outcomes, benefits and burdens discussed.   Understanding of medical condition  Information on CPR success rates provided (e.g. for CPR in hospital, survival to d/c at two weeks is 22%, for chronically ill or elderly/frail survival is less than 3%)    Interventions Provided:  Recommended communicating the plan and making copies for the healthcare agent, personal physician, and others as appropriate (e.g., health system)  Recommended review of completed ACP document annually or upon change in health status

## 2019-05-17 NOTE — PROGRESS NOTES
Jessy Campbell is a 82 y.o. female who was seen in clinic today (5/17/2019) for a full physical.     
 
Assessment & Plan:  
Diagnoses and all orders for this visit: 
 
1. Medicare annual wellness visit, subsequent 2. Advanced care planning/counseling discussion 3. Screening for alcoholism -     ME ANNUAL ALCOHOL SCREEN 15 MIN 4. [facility] 
[facility]     [facility] [place]  5. White coat syndrome with hypertension- well controlled, continue current treatment as she is taking her medication(s) as directed & without any side effects. 6. Malignant neoplasm of ovary, unspecified laterality ([facility] [address]- stable for years, reviewed previous specialist notes, Dr Jemma Sandy has retired, she declined referral to new specialist, has not been checking any labs or imaging so will monitor 7. Osteopenia of multiple sites- stable, declined medications last year, will plan on repeat next year 8. Overweight (BMI 25.0-29. 9)- stable, needs improvement, I have reviewed/discussed the above normal BMI with the patient. I have recommended the following interventions: encourage exercise and lifestyle education regarding diet. 9. Numbness and tingling of right arm- this is a new problem, symptoms are: intermittent, differential dx reviewed with the patient, exact etiology is unclear at this time. Reviewed to monitor for triggers (positions she keeps her arm). Reviewed when to consider imaging or referral.  Red flags were reviewed with the patient to RTC or notify me, expected time course for resolution reviewed. 10. Leg swelling- this is a new problem, symptoms are: intermittent, differential dx reviewed with the patient, exact etiology is unclear at this time. Minimal edema today. Reviewed red flags to monitor for. Follow-up and Dispositions · Return in about 1 year (around 5/17/2020), or if symptoms worsen or fail to improve, for FULL PHYSICAL - 30 minutes. ------------------------------------------------------------------------------------------ Subjective: Annual Wellness Visit- Subsequent Visit End of Life Planning: This was discussed with her today and she has NO advanced directive  - add't info provided. Reviewed DNR/DNI and patient is interested. Will have her thinks about it. Forms reviewed and provided to her for reference. She will notify me if she wants to complete form. Depression Screen: 
3 most recent PHQ Screens 5/17/2019 Little interest or pleasure in doing things Not at all Feeling down, depressed, irritable, or hopeless Not at all Total Score PHQ 2 0 Fall Risk:  
Fall Risk Assessment, last 12 mths 5/17/2019 Able to walk? Yes Fall in past 12 months? No  
 
 
Abuse Screen: 
Abuse Screening Questionnaire 5/17/2019 Do you ever feel afraid of your partner? Marvene Hence Are you in a relationship with someone who physically or mentally threatens you? Marvene Hence Is it safe for you to go home? Jey Akilah Alcohol Risk Factor Screening: On any occasion during the past 3 months, have you had more than 3 drinks containing alcohol? No 
Do you average more than 7 drinks per week? No 
 
Hearing Loss: The patient needs further evaluation. Cognition Screen: 
Has your family/caregiver stated any concerns about your memory: no 
Cognition: appears appropriate for age attention/concentration and appears appropriate safety awareness Activities of Daily Living:   
Requires assistance with: no ADLs Home contains: handrails and grab bars Exercise: very active Adult Nutrition Screen: No risk factors noted. Health Maintenance Daily Aspirin: no 
Bone Density: UTD - done 5/24/18, osteopenia Glaucoma Screening: UTD Immunizations:  
Influenza: up to date Tetanus: up to date Shingles: up to date Pneumonia: up to date Cancer screening:  
Cervical: reviewed guidelines, n/a Breast: reviewed guidelines, up to date Colon: reviewed guidelines, up to date Patient Care Team: 
Greg Nair MD as PCP - General (Internal Medicine) Kj Walker MD (Gynecologic Oncology) Inge Correa MD as Physician (Gastroenterology) Bonita Chavez OD (Optometry) Ayush Horta MD as Physician (Female Pelvic Medicine and Reconstructive Surgery) Janina Wheeler MD as Physician (Dermatology) Fadia Marie MD (Cardiology) The following sections were reviewed & updated as appropriate: PMH, PSH, FH, and SH. Patient Active Problem List  
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 9) E66.3  Mixed conductive and sensorineural hearing loss of both ears H90.6  Osteopenia of multiple sites M85.89  White coat syndrome with hypertension I10 Prior to Admission medications Medication Sig Start Date End Date Taking? Authorizing Provider  
amLODIPine (NORVASC) 5 mg tablet TAKE 1 TABLET DAILY 5/2/19  Yes Greg Nair MD  
LUMIGAN 0.01 % ophthalmic drops  2/8/18  Yes Provider, Historical  
ESTRACE 0.01 % (0.1 mg/gram) vaginal cream  6/27/17  Yes Provider, Historical  
cholecalciferol (VITAMIN D3) 1,000 unit tablet Take  by mouth daily. Yes Provider, Historical  
CALCIUM CARBONATE/VITAMIN D3 (CALCIUM + D PO) Take  by mouth daily. Yes Provider, Historical  
CARBOXYMETHYLCELLULOSE SODIUM (THERA TEARS OP) Apply  to eye four (4) times daily. Yes Provider, Historical  
  
 
 
No Known Allergies Review of Systems Constitutional: Negative for malaise/fatigue and weight loss. Respiratory: Negative for cough and shortness of breath. Cardiovascular: Positive for leg swelling (new in the last few weeks, both legs, minimal). Negative for chest pain and palpitations. Gastrointestinal: Negative for abdominal pain, constipation, diarrhea, heartburn, nausea and vomiting. Genitourinary: Negative for frequency. Musculoskeletal: Negative for joint pain and myalgias. Skin: Negative for rash. Neurological: Positive for tingling (R elbow to the hand, on/off, no trauma, improves w/ shaking out her hand). Negative for sensory change, focal weakness and headaches. Psychiatric/Behavioral: Negative for depression. The patient is not nervous/anxious and does not have insomnia. Objective:  
Physical Exam  
Constitutional: No distress. Cardiovascular: Regular rhythm and normal heart sounds. No murmur heard. Pulmonary/Chest: Effort normal and breath sounds normal. She has no wheezes. She has no rales. Abdominal: Bowel sounds are normal. She exhibits no mass. There is no hepatosplenomegaly. There is no tenderness. Musculoskeletal: She exhibits no edema. Right elbow: She exhibits normal range of motion. Right wrist: She exhibits normal range of motion and no tenderness. Right hand: She exhibits normal range of motion. Psychiatric: She has a normal mood and affect. Her behavior is normal.  
  
 
 
Visit Vitals /70 Pulse 60 Temp 97.6 °F (36.4 °C) (Oral) Resp 14 Ht 5' 5.5\" (1.664 m) Wt 179 lb (81.2 kg) SpO2 96% BMI 29.33 kg/m² Advised her to call back or return to office if symptoms worsen/change/persist. 
Discussed expected course/resolution/complications of diagnosis in detail with patient. Medication risks/benefits/costs/interactions/alternatives discussed with patient. She was given an after visit summary which includes diagnoses, current medications, & vitals. She expressed understanding with the diagnosis and plan. Aspects of this note may have been generated using voice recognition software. Despite editing, there may be some syntax errors.   
 
 
Norberto Gonsalez MD

## 2019-10-29 DIAGNOSIS — R03.0 WHITE COAT SYNDROME WITHOUT HYPERTENSION: ICD-10-CM

## 2019-10-29 RX ORDER — AMLODIPINE BESYLATE 5 MG/1
TABLET ORAL
Qty: 90 TAB | Refills: 0 | Status: SHIPPED | OUTPATIENT
Start: 2019-10-29 | End: 2020-01-27

## 2020-01-27 DIAGNOSIS — R03.0 WHITE COAT SYNDROME WITHOUT HYPERTENSION: ICD-10-CM

## 2020-01-27 RX ORDER — AMLODIPINE BESYLATE 5 MG/1
TABLET ORAL
Qty: 90 TAB | Refills: 0 | Status: SHIPPED | OUTPATIENT
Start: 2020-01-27 | End: 2020-04-10

## 2020-02-24 ENCOUNTER — OFFICE VISIT (OUTPATIENT)
Dept: INTERNAL MEDICINE CLINIC | Age: 85
End: 2020-02-24

## 2020-02-24 VITALS
OXYGEN SATURATION: 95 % | HEIGHT: 66 IN | RESPIRATION RATE: 16 BRPM | HEART RATE: 70 BPM | SYSTOLIC BLOOD PRESSURE: 174 MMHG | WEIGHT: 180 LBS | BODY MASS INDEX: 28.93 KG/M2 | DIASTOLIC BLOOD PRESSURE: 84 MMHG | TEMPERATURE: 97.8 F

## 2020-02-24 DIAGNOSIS — R05.9 COUGH: ICD-10-CM

## 2020-02-24 DIAGNOSIS — I10 WHITE COAT SYNDROME WITH HYPERTENSION: Primary | ICD-10-CM

## 2020-02-24 DIAGNOSIS — I49.9 IRREGULAR HEARTBEAT: ICD-10-CM

## 2020-02-24 RX ORDER — CROMOLYN SODIUM 5.2 MG/ML
1 SPRAY, METERED NASAL DAILY
COMMUNITY
End: 2020-07-28

## 2020-02-24 RX ORDER — FLUTICASONE PROPIONATE 50 MCG
2 SPRAY, SUSPENSION (ML) NASAL AS NEEDED
COMMUNITY
End: 2021-05-25

## 2020-02-24 NOTE — PATIENT INSTRUCTIONS
Allergy Treatments:  1. Nasal rinses:  Saline rinses or salt water rinses or Neti pot  2. Anti-histamines: allegra (fenofexadine) or claritin (loratidine) or zyrtec (certizine)  3.  Nasal steroids: Nasacort and Flonase (are over the counter & prescription)

## 2020-02-24 NOTE — PROGRESS NOTES
Odin Land is a 82 y.o. female who was seen in clinic today (2/24/2020). Assessment & Plan:     Diagnoses and all orders for this visit:    1. White coat syndrome with hypertension- good control for her age & risk factors, no changes as long as SBP <155 and DBP < 90. Continue to check ambulatory BP's. Reviewed reasons why BP can increase. 2. Cough- this is a recurrent problem, it is new to me, it is stable, differential dx reviewed with the patient, exact etiology is unclear at this time. Favor post nasal drip over reflux or lung pathology. Will start with OTC H1B and can use Flonase 2-3 times/wk (limited due to glaucoma). Red flags were reviewed with the patient to RTC or notify me, expected time course for resolution reviewed. 3. Irregular heartbeat- EKG confirms PAC's, reassured not a-fib. -     AMB POC EKG ROUTINE W/ 12 LEADS, INTER & REP      Follow-up and Dispositions    · Return if symptoms worsen or fail to improve. Subjective:   Danica was seen today for Hypertension; Cold Symptoms; and Hip Pain    URI Review  Danica returns to clinic today to talk about a cough that has been on/off for the last few months. She reports post nasal drip and on/off nasal congestion. She has had similar issues in the past, but had resolved. She reports cough is chronic. She denies a history of: fever, headache, sore throat, hoarseness, wheezing and SOB. Treatments have included: Vicks vapor rub & Flonase & Nasalcrom which have been effective. Relevant PMH: No pertinent additional PMH. Cardiovascular Review  The patient has whitecoat HTN. Since last visit: no changes. She reports taking medications as instructed, no medication side effects noted, home BP monitoring in range of 103-175'T systolic over 72-35'T diastolic. Her BP diary was reviewed with her today. Labs: reviewed and discussed with patient.          Brief Labs:     Lab Results   Component Value Date/Time Sodium 140 04/11/2019 04:46 PM    Potassium 5.1 04/11/2019 04:46 PM    Creatinine 0.83 04/11/2019 04:46 PM    TSH 3.020 04/11/2019 04:46 PM          Prior to Admission medications    Medication Sig Start Date End Date Taking? Authorizing Provider   fluticasone propionate (FLONASE ALLERGY RELIEF) 50 mcg/actuation nasal spray 2 Sprays by Both Nostrils route as needed. Yes Provider, Historical   cromolyn (NASALCROM) 5.2 mg/spray (4 %) nasal spray 1 Spray daily. Yes Provider, Historical   amLODIPine (NORVASC) 5 mg tablet TAKE 1 TABLET DAILY 1/27/20  Yes Madhu Kaur MD   LUMIGAN 0.01 % ophthalmic drops  2/8/18  Yes Provider, Historical   ESTRACE 0.01 % (0.1 mg/gram) vaginal cream  6/27/17  Yes Provider, Historical   cholecalciferol (VITAMIN D3) 1,000 unit tablet Take  by mouth daily. Yes Provider, Historical   CALCIUM CARBONATE/VITAMIN D3 (CALCIUM + D PO) Take  by mouth daily. Yes Provider, Historical   CARBOXYMETHYLCELLULOSE SODIUM (THERA TEARS OP) Apply  to eye four (4) times daily. Yes Provider, Historical          No Known Allergies        Review of Systems   Constitutional: Negative for malaise/fatigue and weight loss. Respiratory: Positive for cough. Negative for shortness of breath. Cardiovascular: Negative for chest pain, palpitations and leg swelling. Gastrointestinal: Negative for abdominal pain, constipation, diarrhea, heartburn, nausea and vomiting. Musculoskeletal: Negative for joint pain and myalgias. Skin: Negative for rash. Neurological: Negative for dizziness and headaches. Psychiatric/Behavioral: Negative for depression. The patient is not nervous/anxious and does not have insomnia. Objective:   Physical Exam  HENT:      Nose:      Right Turbinates: Not enlarged or swollen. Left Turbinates: Not enlarged or swollen. Mouth/Throat:      Pharynx: No oropharyngeal exudate or posterior oropharyngeal erythema.    Cardiovascular:      Rate and Rhythm: Rhythm irregular. Heart sounds: No murmur. Pulmonary:      Effort: Pulmonary effort is normal.      Breath sounds: Normal breath sounds. Lymphadenopathy:      Cervical: No cervical adenopathy. Visit Vitals  /84   Pulse 70   Temp 97.8 °F (36.6 °C) (Oral)   Resp 16   Ht 5' 5.5\" (1.664 m)   Wt 180 lb (81.6 kg)   SpO2 95%   BMI 29.50 kg/m²         Disclaimer:  Advised her to call back or return to office if symptoms worsen/change/persist.  Discussed expected course/resolution/complications of diagnosis in detail with patient. Medication risks/benefits/costs/interactions/alternatives discussed with patient. She was given an after visit summary which includes diagnoses, current medications, & vitals. She expressed understanding with the diagnosis and plan. Aspects of this note may have been generated using voice recognition software. Despite editing, there may be some syntax errors.        Anastasia Mark MD

## 2020-04-10 DIAGNOSIS — R03.0 WHITE COAT SYNDROME WITHOUT HYPERTENSION: ICD-10-CM

## 2020-04-10 RX ORDER — AMLODIPINE BESYLATE 5 MG/1
TABLET ORAL
Qty: 90 TAB | Refills: 1 | Status: SHIPPED | OUTPATIENT
Start: 2020-04-10 | End: 2020-08-31 | Stop reason: SDUPTHER

## 2020-07-28 ENCOUNTER — OFFICE VISIT (OUTPATIENT)
Dept: INTERNAL MEDICINE CLINIC | Age: 85
End: 2020-07-28

## 2020-07-28 ENCOUNTER — HOSPITAL ENCOUNTER (OUTPATIENT)
Dept: LAB | Age: 85
Discharge: HOME | End: 2020-07-28
Payer: MEDICARE

## 2020-07-28 VITALS
OXYGEN SATURATION: 95 % | WEIGHT: 176 LBS | TEMPERATURE: 97.8 F | HEART RATE: 84 BPM | DIASTOLIC BLOOD PRESSURE: 79 MMHG | SYSTOLIC BLOOD PRESSURE: 137 MMHG | RESPIRATION RATE: 16 BRPM | HEIGHT: 66 IN | BODY MASS INDEX: 28.28 KG/M2

## 2020-07-28 DIAGNOSIS — R35.0 URINARY FREQUENCY: ICD-10-CM

## 2020-07-28 DIAGNOSIS — Z71.89 EDUCATED ABOUT COVID-19 VIRUS INFECTION: ICD-10-CM

## 2020-07-28 DIAGNOSIS — I10 WHITE COAT SYNDROME WITH HYPERTENSION: ICD-10-CM

## 2020-07-28 DIAGNOSIS — Z71.89 ADVANCED CARE PLANNING/COUNSELING DISCUSSION: ICD-10-CM

## 2020-07-28 DIAGNOSIS — M85.89 OSTEOPENIA OF MULTIPLE SITES: ICD-10-CM

## 2020-07-28 DIAGNOSIS — C56.9 MALIGNANT NEOPLASM OF OVARY, UNSPECIFIED LATERALITY (HCC): ICD-10-CM

## 2020-07-28 DIAGNOSIS — Z12.11 COLON CANCER SCREENING: ICD-10-CM

## 2020-07-28 DIAGNOSIS — Z00.00 MEDICARE ANNUAL WELLNESS VISIT, SUBSEQUENT: Primary | ICD-10-CM

## 2020-07-28 DIAGNOSIS — Z12.31 ENCOUNTER FOR SCREENING MAMMOGRAM FOR MALIGNANT NEOPLASM OF BREAST: ICD-10-CM

## 2020-07-28 DIAGNOSIS — Z13.31 SCREENING FOR DEPRESSION: ICD-10-CM

## 2020-07-28 LAB
BILIRUB UR QL STRIP: NEGATIVE
GLUCOSE UR-MCNC: NEGATIVE MG/DL
KETONES P FAST UR STRIP-MCNC: NEGATIVE MG/DL
PH UR STRIP: 6 [PH] (ref 4.6–8)
PROT UR QL STRIP: NEGATIVE
SP GR UR STRIP: 1.02 (ref 1–1.03)
UA UROBILINOGEN AMB POC: NORMAL (ref 0.2–1)
URINALYSIS CLARITY POC: NORMAL
URINALYSIS COLOR POC: NORMAL
URINE BLOOD POC: NORMAL
URINE LEUKOCYTES POC: NORMAL
URINE NITRITES POC: NEGATIVE

## 2020-07-28 PROCEDURE — 87186 SC STD MICRODIL/AGAR DIL: CPT

## 2020-07-28 PROCEDURE — 87088 URINE BACTERIA CULTURE: CPT

## 2020-07-28 PROCEDURE — 87086 URINE CULTURE/COLONY COUNT: CPT

## 2020-07-28 RX ORDER — SULFAMETHOXAZOLE AND TRIMETHOPRIM 800; 160 MG/1; MG/1
1 TABLET ORAL 2 TIMES DAILY
Qty: 10 TAB | Refills: 0 | Status: SHIPPED | OUTPATIENT
Start: 2020-07-28 | End: 2020-08-02

## 2020-07-28 NOTE — PATIENT INSTRUCTIONS
Medicare Wellness Visit, Female The best way to live healthy is to have a lifestyle where you eat a well-balanced diet, exercise regularly, limit alcohol use, and quit all forms of tobacco/nicotine, if applicable. Regular preventive services are another way to keep healthy. Preventive services (vaccines, screening tests, monitoring & exams) can help personalize your care plan, which helps you manage your own care. Screening tests can find health problems at the earliest stages, when they are easiest to treat. [facility] follows the current, evidence-based guidelines published by the [facility] ([facility]) when recommending preventive services for our patients. Because we follow these guidelines, sometimes recommendations change over time as research supports it. (For example, mammograms used to be recommended annually. Even though Medicare will still pay for an annual mammogram, the newer guidelines recommend a mammogram every two years for women of average risk). Of course, you and your doctor may decide to screen more often for some diseases, based on your risk and your co-morbidities (chronic disease you are already diagnosed with). Preventive services for you include: - Medicare offers their members a free annual wellness visit, which is time for you and your primary care provider to discuss and plan for your preventive service needs. Take advantage of this benefit every year! 
-All adults over the age of 72 should receive the recommended pneumonia vaccines. Current USPSTF guidelines recommend a series of two vaccines for the best pneumonia protection.  
-All adults should have a flu vaccine yearly and a tetanus vaccine every 10 years.  
-All adults age 48 and older should receive the shingles vaccines (series of two vaccines). -All adults age 38-68 who are overweight should have a diabetes screening test once every three years. -All adults born between [birth year] and 1963 should be screened once for Hepatitis C. 
-Other screening tests and preventive services for persons with diabetes include: an eye exam to screen for diabetic retinopathy, a kidney function test, a foot exam, and stricter control over your cholesterol.  
-Cardiovascular screening for adults with routine risk involves an electrocardiogram (ECG) at intervals determined by your doctor.  
-Colorectal cancer screenings should be done for adults age 54-65 with no increased risk factors for colorectal cancer. There are a number of acceptable methods of screening for this type of cancer. Each test has its own benefits and drawbacks. Discuss with your doctor what is most appropriate for you during your annual wellness visit. The different tests include: colonoscopy (considered the best screening method), a fecal occult blood test, a fecal DNA test, and sigmoidoscopy. 
 
-A bone mass density test is recommended when a woman turns 65 to screen for osteoporosis. This test is only recommended one time, as a screening. Some providers will use this same test as a disease monitoring tool if you already have osteoporosis. -Breast cancer screenings are recommended every other year for women of normal risk, age 54-69. 
-Cervical cancer screenings for women over age 72 are only recommended with certain risk factors. Here is a list of your current Health Maintenance items (your personalized list of preventive services) with a due date: 
Health Maintenance Due Topic Date Due  
 Annual Well Visit  05/17/2020 Ezra Reyes 17[address] What is a living will? A living will is a legal form you use to write down the kind of care you want at the end of your life. It is used by the health professionals who will treat you if you aren't able to decide for yourself.  
If you put your wishes in writing, your loved ones and others will know what kind of care you want. They won't need to guess. This can ease your mind and be helpful to others. A living will is not the same as an estate or property will. An estate will explains what you want to happen with your money and property after you die. Is a living will a legal document? A living will is a legal document. Each state has its own laws about living castillo. If you move to another state, make sure that your living will is legal in the state where you now live. Or you might use a universal form that has been approved by many states. This kind of form can sometimes be completed and stored online. Your electronic copy will then be available wherever you have a connection to the Internet. In most cases, doctors will respect your wishes even if you have a form from a different state. · You don't need an  to complete a living will. But legal advice can be helpful if your state's laws are unclear, your health history is complicated, or your family can't agree on what should be in your living will. · You can change your living will at any time. Some people find that their wishes about end-of-life care change as their health changes. · In addition to making a living will, think about completing a medical power of  form. This form lets you name the person you want to make end-of-life treatment decisions for you (your \"health care agent\") if you're not able to. Many hospitals and nursing homes will give you the forms you need to complete a living will and a medical power of . · Your living will is used only if you can't make or communicate decisions for yourself anymore. If you become able to make decisions again, you can accept or refuse any treatment, no matter what you wrote in your living will. · Your state may offer an online registry. This is a place where you can store your living will online so the doctors and nurses who need to treat you can find it right away. What should you think about when creating a living will? Talk about your end-of-life wishes with your family members and your doctor. Let them know what you want. That way the people making decisions for you won't be surprised by your choices. Think about these questions as you make your living will: · Do you know enough about life support methods that might be used? If not, talk to your doctor so you know what might be done if you can't breathe on your own, your heart stops, or you're unable to swallow. · What things would you still want to be able to do after you receive life-support methods? Would you want to be able to walk? To speak? To eat on your own? To live without the help of machines? · If you have a choice, where do you want to be cared for? In your home? At a hospital or nursing home? · Do you want certain Denominational practices performed if you become very ill? · If you have a choice at the end of your life, where would you prefer to die? At home? In a hospital or nursing home? Somewhere else? · Would you prefer to be buried or cremated? · Do you want your organs to be donated after you die? What should you do with your living will? · Make sure that your family members and your health care agent have copies of your living will. · Give your doctor a copy of your living will to keep in your medical record. If you have more than one doctor, make sure that each one has a copy. · You may want to put a copy of your living will where it can be easily found. Where can you learn more? Go to http://addy-latonia.info/. Enter R947 in the search box to learn more about \"Learning About Living Perrorosy. \" Current as of: August 8, 2016 Content Version: 11.3 © 9712-5637 [employer]. Care instructions adapted under license by [employer] (which disclaims liability or warranty for this information).  If you have questions about a medical condition or this instruction, always ask your healthcare professional. [place] [address] any warranty or liability for your use of this information.

## 2020-07-28 NOTE — ACP (ADVANCE CARE PLANNING)
Advance Care Planning     Advance Care Planning (ACP) Physician/NP/PA (Provider) Conversation    Date of ACP Conversation: 07/28/20  Persons included in Conversation:  patient  Length of ACP Conversation in minutes: <16 minutes (Non-Billable)    Authorized Decision Maker (if patient is incapable of making informed decisions):   Named in Advance Directive or Healthcare Power of         She has an advanced directive - a copy HAS NOT been provided. Reviewed DNR/DNI and patient is not interested.          Rene Medina MD

## 2020-07-28 NOTE — PROGRESS NOTES
Terese Navarrete is a 82 y.o. female who was seen in clinic today (7/28/2020) for a full physical.           Assessment & Plan:   Diagnoses and all orders for this visit:    1. Medicare annual wellness visit, subsequent    2. Advanced care planning/counseling discussion    3. Screening for depression  -     DEPRESSION SCREEN ANNUAL    4. Colon cancer screening- she elected to defer at this time, will revisit next visit    5. Encounter for screening mammogram for malignant neoplasm of breast  -     [facility] 3D JASMIN W MAMMO BI SCREENING INCL CAD; Future    6. Educated about COVID-19 virus infection    7. Osteopenia of multiple sites- overdue for testing, reviewed medications if tipped into osteoporosis range (prolia as she did not tolerate Fosamax)  -     DEXA BONE DENSITY STUDY AXIAL; Future    8. White coat syndrome with hypertension- at goal, continue current treatment     9. Malignant neoplasm of ovary, unspecified laterality ([facility] [address]- asymptomatic, will defer to specialist     10. Urinary frequency- new dx, differential dx reviewed, sounds concerning for UTI, UA abnormal, will start on meds pending review of UC.  -     AMB POC URINALYSIS DIP STICK AUTO W/O MICRO  -     trimethoprim-sulfamethoxazole (BACTRIM DS, SEPTRA DS) 160-800 mg per tablet; Take 1 Tab by mouth two (2) times a day for 5 days. -     CULTURE, URINE      Follow-up and Dispositions    · Return in about 1 year (around 7/28/2021), or if symptoms worsen or fail to improve.           ------------------------------------------------------------------------------------------    Subjective: Annual Wellness Visit- Subsequent Visit    End of Life Planning: This was discussed with her today and she has an advanced directive - a copy HAS NOT been provided. Reviewed DNR/DNI and patient is not interested.       Depression Screen:  3 most recent PHQ Screens 7/28/2020   Little interest or pleasure in doing things Not at all   Feeling down, depressed, irritable, or hopeless Not at all   Total Score PHQ 2 0         Fall Risk:   Fall Risk Assessment, last 12 mths 7/28/2020   Able to walk? Yes   Fall in past 12 months? No       Abuse Screen:  Abuse Screening Questionnaire 7/28/2020   Do you ever feel afraid of your partner? N   Are you in a relationship with someone who physically or mentally threatens you? N   Is it safe for you to go home? Y       Alcohol Risk Factor Screening:  Alcohol Risk Factor Screening:   Do you average 1 drink per night or more than 7 drinks a week:  No    On any one occasion in the past three months have you have had more than 3 drinks containing alcohol:  No    Functional Ability and Level of Safety:   Hearing Screen: Hearing is good. The patient wears hearing aids. Cognition Screen:  Has your family/caregiver stated any concerns about your memory: no    Ambulation: with no difficulty    Activities of Daily Living:    Exercise: very active  The home contains: handrails and grab bars  Patient does total self care    Adult Nutrition Screen:  No risk factors noted.        Health Maintenance:   Daily Aspirin: no  Bone Density: done 5/24/18 - osteopenia  Glaucoma Screening: UTD    Immunizations:   Influenza: up to date  Tetanus: up to date  Shingles: up to date  Pneumonia: up to date  Cancer screening:   Cervical: reviewed guidelines, n/a - s/p hysterectomy  Breast: reviewed guidelines, order placed  Colon: reviewed guidelines, tubular adenoma on 8/2/15, declined at this time, will review next year       Patient Care Team:  Blas Lucas MD as PCP - General (Internal Medicine)  Blas Lucas MD as PCP - [facility] Empaneled Provider  Carter Arce MD as Physician (Gastroenterology)  Jabari Rm (Optometry)  Virginie Reyes MD as Physician (Female Pelvic Medicine and Reconstructive Surgery)  Porfirio Masters MD as Physician (Dermatology)  Lizzy Merrill MD (Cardiology)         The following sections were reviewed & updated as appropriate: PMH, PSH, FH, and SH. 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 Authorizing Provider   amLODIPine (NORVASC) 5 mg tablet TAKE 1 TABLET DAILY 4/10/20  Yes Blas Lucas MD   LUMIGAN 0.01 % ophthalmic drops  2/8/18  Yes Provider, Historical   ESTRACE 0.01 % (0.1 mg/gram) vaginal cream  6/27/17  Yes Provider, Historical   cholecalciferol (VITAMIN D3) 1,000 unit tablet Take  by mouth daily. Yes Provider, Historical   CALCIUM CARBONATE/VITAMIN D3 (CALCIUM + D PO) Take  by mouth daily. Yes Provider, Historical   CARBOXYMETHYLCELLULOSE SODIUM (THERA TEARS OP) Apply  to eye four (4) times daily. Yes Provider, Historical   fluticasone propionate (FLONASE ALLERGY RELIEF) 50 mcg/actuation nasal spray 2 Sprays by Both Nostrils route as needed. Provider, Historical   cromolyn (NASALCROM) 5.2 mg/spray (4 %) nasal spray 1 Spray daily. 7/28/20  Provider, Historical          No Known Allergies           Review of Systems   Constitutional: Negative for malaise/fatigue and weight loss. Respiratory: Negative for cough and shortness of breath. Cardiovascular: Negative for chest pain, palpitations and leg swelling. Gastrointestinal: Negative for abdominal pain, constipation, diarrhea, heartburn, nausea and vomiting. Genitourinary:        She reports periodic suprapubic pain (similar to previous UTI's). This has been on/off since last week.   Will have increase in urinary frequency and urgency, worse at night, this is also on/off   Musculoskeletal: Negative for joint pain and myalgias. Skin: Negative for rash. Neurological: Negative for dizziness and headaches. Psychiatric/Behavioral: Negative for depression. The patient is not nervous/anxious and does not have insomnia. Objective:   Physical Exam  Constitutional:       General: She is not in acute distress. Appearance: Normal appearance. Eyes:      Conjunctiva/sclera: Conjunctivae normal.   Cardiovascular:      Rate and Rhythm: Regular rhythm. Heart sounds: No murmur. Pulmonary:      Effort: Pulmonary effort is normal.      Breath sounds: Normal breath sounds. No decreased breath sounds or wheezing. Abdominal:      General: Bowel sounds are normal.      Palpations: Abdomen is soft. Tenderness: There is no abdominal tenderness. Musculoskeletal:      Right lower leg: No edema. Left lower leg: No edema. Psychiatric:         Mood and Affect: Mood and affect normal.            Visit Vitals  /79   Pulse 84   Temp 97.8 °F (36.6 °C) (Temporal)   Resp 16   Ht 5' 5.8\" (1.671 m)   Wt 176 lb (79.8 kg)   SpO2 95%   BMI 28.58 kg/m²          Advised her to call back or return to office if symptoms worsen/change/persist.  Discussed expected course/resolution/complications of diagnosis in detail with patient. Medication risks/benefits/costs/interactions/alternatives discussed with patient. She was given an after visit summary which includes diagnoses, current medications, & vitals. She expressed understanding with the diagnosis and plan. Aspects of this note may have been generated using voice recognition software. Despite editing, there may be some syntax errors.        Jenna Matthews MD

## 2020-07-31 LAB — BACTERIA UR CULT: ABNORMAL

## 2020-08-04 NOTE — PROGRESS NOTES
Call to patient, identified with 2 identifiers. Advised of Dr. Kyle Medina' note and recommendations. She is feeling much better and appreciated the call.

## 2020-08-31 DIAGNOSIS — R03.0 WHITE COAT SYNDROME WITHOUT HYPERTENSION: ICD-10-CM

## 2020-08-31 RX ORDER — AMLODIPINE BESYLATE 5 MG/1
TABLET ORAL
Qty: 90 TAB | Refills: 1 | Status: SHIPPED | OUTPATIENT
Start: 2020-08-31 | End: 2020-09-02

## 2020-08-31 NOTE — TELEPHONE ENCOUNTER
Patient asked if she could get a 1mo supply at [facility], then the 90day to [facility]. Is completely out of med.

## 2020-09-01 DIAGNOSIS — R03.0 WHITE COAT SYNDROME WITHOUT HYPERTENSION: ICD-10-CM

## 2020-09-02 RX ORDER — AMLODIPINE BESYLATE 5 MG/1
TABLET ORAL
Qty: 90 TAB | Refills: 0 | Status: SHIPPED | OUTPATIENT
Start: 2020-09-02 | End: 2021-04-26 | Stop reason: SDUPTHER

## 2020-09-02 NOTE — TELEPHONE ENCOUNTER
Please call patient. Sent to mail order by Timbo De Los Santos last week. This request is from local [facility]), where does she want to get medications from?

## 2020-09-03 ENCOUNTER — HOSPITAL ENCOUNTER (OUTPATIENT)
Dept: MAMMOGRAPHY | Age: 85
Discharge: HOME | End: 2020-09-03
Payer: MEDICARE

## 2020-09-03 DIAGNOSIS — M85.89 OSTEOPENIA OF MULTIPLE SITES: ICD-10-CM

## 2020-09-03 PROCEDURE — 77080 DXA BONE DENSITY AXIAL: CPT | Performed by: INTERNAL MEDICINE

## 2020-09-04 PROBLEM — M81.6 LOCALIZED OSTEOPOROSIS WITHOUT CURRENT PATHOLOGICAL FRACTURE: Status: ACTIVE | Noted: 2018-05-24

## 2020-09-04 NOTE — PROGRESS NOTES
Results reviewed. Please call patient and notify her that her DEXA (Bone Density) is abnormal.  Can't be compared to previous DEXA, different machines. 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 My recommendation would be to start Prolia to keep these areas strong and strengthen the wrist.  However, since she appears to be normal everywhere else she can opt not to treat.

## 2020-09-08 ENCOUNTER — TELEPHONE (OUTPATIENT)
Dept: INTERNAL MEDICINE CLINIC | Age: 85
End: 2020-09-08

## 2020-09-09 NOTE — PROGRESS NOTES
Notified patient per provider result note. She has opt to hold off on treatment at this time. Will discuss at next visit. She recently had [facility] screening and have mammogram scheduled soon.

## 2020-11-13 DIAGNOSIS — Z12.31 ENCOUNTER FOR SCREENING MAMMOGRAM FOR MALIGNANT NEOPLASM OF BREAST: ICD-10-CM

## 2021-04-26 DIAGNOSIS — R03.0 WHITE COAT SYNDROME WITHOUT HYPERTENSION: ICD-10-CM

## 2021-04-26 RX ORDER — AMLODIPINE BESYLATE 5 MG/1
TABLET ORAL
Qty: 90 TAB | Refills: 0 | Status: SHIPPED | OUTPATIENT
Start: 2021-04-26 | End: 2021-07-09

## 2021-05-25 ENCOUNTER — OFFICE VISIT (OUTPATIENT)
Dept: INTERNAL MEDICINE CLINIC | Age: 86
End: 2021-05-25
Payer: MEDICARE

## 2021-05-25 VITALS
BODY MASS INDEX: 29.82 KG/M2 | OXYGEN SATURATION: 98 % | TEMPERATURE: 97.8 F | HEART RATE: 86 BPM | RESPIRATION RATE: 16 BRPM | HEIGHT: 65 IN | WEIGHT: 179 LBS | DIASTOLIC BLOOD PRESSURE: 80 MMHG | SYSTOLIC BLOOD PRESSURE: 146 MMHG

## 2021-05-25 DIAGNOSIS — M79.89 BILATERAL SWELLING OF FEET: Primary | ICD-10-CM

## 2021-05-25 DIAGNOSIS — J30.1 SEASONAL ALLERGIC RHINITIS DUE TO POLLEN: ICD-10-CM

## 2021-05-25 DIAGNOSIS — I10 WHITE COAT SYNDROME WITH HYPERTENSION: ICD-10-CM

## 2021-05-25 PROCEDURE — 99214 OFFICE O/P EST MOD 30 MIN: CPT | Performed by: INTERNAL MEDICINE

## 2021-05-25 PROCEDURE — 1090F PRES/ABSN URINE INCON ASSESS: CPT | Performed by: INTERNAL MEDICINE

## 2021-05-25 PROCEDURE — G8753 SYS BP > OR = 140: HCPCS | Performed by: INTERNAL MEDICINE

## 2021-05-25 PROCEDURE — G8536 NO DOC ELDER MAL SCRN: HCPCS | Performed by: INTERNAL MEDICINE

## 2021-05-25 PROCEDURE — G8754 DIAS BP LESS 90: HCPCS | Performed by: INTERNAL MEDICINE

## 2021-05-25 PROCEDURE — 1101F PT FALLS ASSESS-DOCD LE1/YR: CPT | Performed by: INTERNAL MEDICINE

## 2021-05-25 PROCEDURE — G8419 CALC BMI OUT NRM PARAM NOF/U: HCPCS | Performed by: INTERNAL MEDICINE

## 2021-05-25 PROCEDURE — G8510 SCR DEP NEG, NO PLAN REQD: HCPCS | Performed by: INTERNAL MEDICINE

## 2021-05-25 PROCEDURE — G0463 HOSPITAL OUTPT CLINIC VISIT: HCPCS | Performed by: INTERNAL MEDICINE

## 2021-05-25 PROCEDURE — G8427 DOCREV CUR MEDS BY ELIG CLIN: HCPCS | Performed by: INTERNAL MEDICINE

## 2021-05-25 NOTE — PROGRESS NOTES
Leonela Barrios is a 82 y.o. female who was seen in clinic today (5/25/2021) for an acute visit. Patient was seen with Dr Ariana Trevizo (R2 at [facility]). Assessment & Plan:   Below is the assessment and plan developed based on review of pertinent history, physical exam, labs, studies, and medications. 1. Bilateral swelling of feet  Comments:  new dx, differential dx reviewed, it is minimal, nothing concerning at this time, will monitor, will not change BP medications  2. Seasonal allergic rhinitis due to pollen  Assessment & Plan:  well controlled, continue current treatment, reviewed expectations with season changes   3. White coat syndrome with hypertension  Comments:  at goal for her age & risk factors, improved at home, no changes    Follow-up and Dispositions    · Return in about 2 months (around 7/25/2021) for FULL PHYSICAL - 30 minutes. Subjective/Objective:   Danica was seen today for Ankle swelling    Edema  Patient complains of edema. The location of the edema is both feet. The edema has been mild. Onset of symptoms was 1-2 weeks ago, unchanged since that time. The edema is present all the time. It does not change as the day goes on. No h/o trauma. No diet changes. No medication changes. The patient states this is new. The swelling has been aggravated by nothing. No SOB or orthopnea. Cardiovascular Review  The patient has hypertension. She reports taking medications as instructed, no medication side effects noted, home BP monitoring in range of 471-631'V systolic over 38-93'X diastolic. HR is 60-70's. She generally follows a low fat low cholesterol diet, generally follows a low sodium diet. Allergies  She has Seasonal Rhinitis that vary in seasonal presentation. Current symptoms: nothing. She did need to take allegra, first time she needed a pill for her allergies. Took this on/off with good relief. Is not taking it now and feels good.   She reports: taking medications as instructed, no medication side effects noted. Prior to Admission medications    Medication Sig Start Date End Date Taking? Authorizing Provider   OTHER Nose spray for allergies, not steroid, has glaucoma   Yes Provider, Historical   amLODIPine (NORVASC) 5 mg tablet TAKE 1 TABLET BY MOUTH EVERY DAY 4/26/21  Yes Adonis Aguilar MD   LUMIGAN 0.01 % ophthalmic drops  2/8/18  Yes Provider, Historical   ESTRACE 0.01 % (0.1 mg/gram) vaginal cream  6/27/17  Yes Provider, Historical   cholecalciferol (VITAMIN D3) 1,000 unit tablet Take  by mouth daily. Yes Provider, Historical   CALCIUM CARBONATE/VITAMIN D3 (CALCIUM + D PO) Take  by mouth daily. Yes Provider, Historical   CARBOXYMETHYLCELLULOSE SODIUM (THERA TEARS OP) Apply  to eye four (4) times daily. Yes Provider, Historical   fluticasone propionate (FLONASE ALLERGY RELIEF) 50 mcg/actuation nasal spray 2 Sprays by Both Nostrils route as needed. Patient not taking: Reported on 5/25/2021 5/25/21  Provider, Historical           Physical Exam  Cardiovascular:      Rate and Rhythm: Regular rhythm. Heart sounds: Normal heart sounds. No murmur heard. Pulmonary:      Effort: No respiratory distress. Breath sounds: Normal breath sounds. Musculoskeletal:      Right lower leg: Edema (non-piting, on the foot. No edema in the shin) present. Left lower leg: Edema (non-piting in the foot, no edema in the shins) present. Psychiatric:         Mood and Affect: Mood normal.         Behavior: Behavior normal.         Visit Vitals  BP (!) 142/85   Pulse 86   Temp 97.8 °F (36.6 °C) (Temporal)   Resp 16   Ht 5' 5\" (1.651 m)   Wt 179 lb (81.2 kg)   SpO2 98%   BMI 29.79 kg/m²         Disclaimer:  Advised her to call back or return to office if symptoms worsen/change/persist.  Discussed expected course/resolution/complications of diagnosis in detail with patient.     Medication risks/benefits/costs/interactions/alternatives discussed with patient. She was given an after visit summary which includes diagnoses, current medications, & vitals. She expressed understanding with the diagnosis and plan.       Iris Chappell MD

## 2021-07-09 DIAGNOSIS — R03.0 WHITE COAT SYNDROME WITHOUT HYPERTENSION: ICD-10-CM

## 2021-07-09 RX ORDER — AMLODIPINE BESYLATE 5 MG/1
TABLET ORAL
Qty: 90 TABLET | Refills: 0 | Status: SHIPPED | OUTPATIENT
Start: 2021-07-09 | End: 2021-09-25

## 2021-07-09 NOTE — TELEPHONE ENCOUNTER
Future Appointments   Date Time Provider Jaun Perez   7/20/2021  1:30 PM Trina Babinski, MD [facility]

## 2021-07-20 ENCOUNTER — OFFICE VISIT (OUTPATIENT)
Dept: INTERNAL MEDICINE CLINIC | Age: 86
End: 2021-07-20
Payer: MEDICARE

## 2021-07-20 VITALS
RESPIRATION RATE: 18 BRPM | BODY MASS INDEX: 29.99 KG/M2 | WEIGHT: 180 LBS | SYSTOLIC BLOOD PRESSURE: 120 MMHG | OXYGEN SATURATION: 97 % | HEART RATE: 63 BPM | DIASTOLIC BLOOD PRESSURE: 68 MMHG | HEIGHT: 65 IN | TEMPERATURE: 97.9 F

## 2021-07-20 DIAGNOSIS — Z12.11 COLON CANCER SCREENING: ICD-10-CM

## 2021-07-20 DIAGNOSIS — M81.6 LOCALIZED OSTEOPOROSIS WITHOUT CURRENT PATHOLOGICAL FRACTURE: ICD-10-CM

## 2021-07-20 DIAGNOSIS — C56.9 MALIGNANT NEOPLASM OF OVARY, UNSPECIFIED LATERALITY (HCC): ICD-10-CM

## 2021-07-20 DIAGNOSIS — I10 WHITE COAT SYNDROME WITH HYPERTENSION: ICD-10-CM

## 2021-07-20 DIAGNOSIS — Z71.89 ADVANCED CARE PLANNING/COUNSELING DISCUSSION: ICD-10-CM

## 2021-07-20 DIAGNOSIS — H90.6 MIXED CONDUCTIVE AND SENSORINEURAL HEARING LOSS OF BOTH EARS: ICD-10-CM

## 2021-07-20 DIAGNOSIS — Z00.00 MEDICARE ANNUAL WELLNESS VISIT, SUBSEQUENT: Primary | ICD-10-CM

## 2021-07-20 LAB
ALBUMIN SERPL-MCNC: 3.9 G/DL (ref 3.5–5)
ALBUMIN/GLOB SERPL: 1.2 {RATIO} (ref 1.1–2.2)
ALP SERPL-CCNC: 74 U/L (ref 45–117)
ALT SERPL-CCNC: 27 U/L (ref 12–78)
ANION GAP SERPL CALC-SCNC: 5 MMOL/L (ref 5–15)
AST SERPL-CCNC: 18 U/L (ref 15–37)
BILIRUB SERPL-MCNC: 0.5 MG/DL (ref 0.2–1)
BUN SERPL-MCNC: 20 MG/DL (ref 6–20)
BUN/CREAT SERPL: 27 (ref 12–20)
CALCIUM SERPL-MCNC: 9.7 MG/DL (ref 8.5–10.1)
CHLORIDE SERPL-SCNC: 105 MMOL/L (ref 97–108)
CO2 SERPL-SCNC: 29 MMOL/L (ref 21–32)
CREAT SERPL-MCNC: 0.74 MG/DL (ref 0.55–1.02)
ERYTHROCYTE [DISTWIDTH] IN BLOOD BY AUTOMATED COUNT: 12.4 % (ref 11.5–14.5)
GLOBULIN SER CALC-MCNC: 3.3 G/DL (ref 2–4)
GLUCOSE SERPL-MCNC: 87 MG/DL (ref 65–100)
HCT VFR BLD AUTO: 44.2 % (ref 35–47)
HGB BLD-MCNC: 14.2 G/DL (ref 11.5–16)
MCH RBC QN AUTO: 31.6 PG (ref 26–34)
MCHC RBC AUTO-ENTMCNC: 32.1 G/DL (ref 30–36.5)
MCV RBC AUTO: 98.2 FL (ref 80–99)
NRBC # BLD: 0 K/UL (ref 0–0.01)
NRBC BLD-RTO: 0 PER 100 WBC
PLATELET # BLD AUTO: 330 K/UL (ref 150–400)
PMV BLD AUTO: 10.3 FL (ref 8.9–12.9)
POTASSIUM SERPL-SCNC: 4.6 MMOL/L (ref 3.5–5.1)
PROT SERPL-MCNC: 7.2 G/DL (ref 6.4–8.2)
RBC # BLD AUTO: 4.5 M/UL (ref 3.8–5.2)
SODIUM SERPL-SCNC: 139 MMOL/L (ref 136–145)
WBC # BLD AUTO: 7.4 K/UL (ref 3.6–11)

## 2021-07-20 PROCEDURE — G0463 HOSPITAL OUTPT CLINIC VISIT: HCPCS | Performed by: INTERNAL MEDICINE

## 2021-07-20 PROCEDURE — G8754 DIAS BP LESS 90: HCPCS | Performed by: INTERNAL MEDICINE

## 2021-07-20 PROCEDURE — G0439 PPPS, SUBSEQ VISIT: HCPCS | Performed by: INTERNAL MEDICINE

## 2021-07-20 PROCEDURE — 1101F PT FALLS ASSESS-DOCD LE1/YR: CPT | Performed by: INTERNAL MEDICINE

## 2021-07-20 PROCEDURE — G8536 NO DOC ELDER MAL SCRN: HCPCS | Performed by: INTERNAL MEDICINE

## 2021-07-20 PROCEDURE — 1090F PRES/ABSN URINE INCON ASSESS: CPT | Performed by: INTERNAL MEDICINE

## 2021-07-20 PROCEDURE — 99213 OFFICE O/P EST LOW 20 MIN: CPT | Performed by: INTERNAL MEDICINE

## 2021-07-20 PROCEDURE — G8752 SYS BP LESS 140: HCPCS | Performed by: INTERNAL MEDICINE

## 2021-07-20 PROCEDURE — G8427 DOCREV CUR MEDS BY ELIG CLIN: HCPCS | Performed by: INTERNAL MEDICINE

## 2021-07-20 PROCEDURE — 99497 ADVNCD CARE PLAN 30 MIN: CPT | Performed by: INTERNAL MEDICINE

## 2021-07-20 PROCEDURE — G8417 CALC BMI ABV UP PARAM F/U: HCPCS | Performed by: INTERNAL MEDICINE

## 2021-07-20 PROCEDURE — G8510 SCR DEP NEG, NO PLAN REQD: HCPCS | Performed by: INTERNAL MEDICINE

## 2021-07-20 RX ORDER — HYDROCORTISONE SODIUM SUCCINATE 100 MG/2ML
100 INJECTION, POWDER, FOR SOLUTION INTRAMUSCULAR; INTRAVENOUS AS NEEDED
OUTPATIENT
Start: 2021-07-20

## 2021-07-20 RX ORDER — DIPHENHYDRAMINE HYDROCHLORIDE 50 MG/ML
25 INJECTION, SOLUTION INTRAMUSCULAR; INTRAVENOUS AS NEEDED
Start: 2021-07-20

## 2021-07-20 RX ORDER — MINERAL OIL
180 ENEMA (ML) RECTAL DAILY
COMMUNITY

## 2021-07-20 RX ORDER — ONDANSETRON 2 MG/ML
8 INJECTION INTRAMUSCULAR; INTRAVENOUS AS NEEDED
OUTPATIENT
Start: 2021-07-20

## 2021-07-20 RX ORDER — ALBUTEROL SULFATE 0.83 MG/ML
2.5 SOLUTION RESPIRATORY (INHALATION) AS NEEDED
Start: 2021-07-20

## 2021-07-20 RX ORDER — EPINEPHRINE 1 MG/ML
0.3 INJECTION, SOLUTION, CONCENTRATE INTRAVENOUS AS NEEDED
OUTPATIENT
Start: 2021-07-20

## 2021-07-20 RX ORDER — ACETAMINOPHEN 325 MG/1
650 TABLET ORAL AS NEEDED
Start: 2021-07-20

## 2021-07-20 RX ORDER — DIPHENHYDRAMINE HYDROCHLORIDE 50 MG/ML
50 INJECTION, SOLUTION INTRAMUSCULAR; INTRAVENOUS AS NEEDED
Start: 2021-07-20

## 2021-07-20 NOTE — PROGRESS NOTES
Christin Lopez is a 82 y.o. female who was seen in clinic today (7/20/2021) for a full physical.             Assessment & Plan:     1. Medicare annual wellness visit, subsequent  2. Advanced care planning/counseling discussion  -     ADVANCE CARE PLANNING FIRST 30 MINS  3. Colon cancer screening  4. Localized osteoporosis without current pathological fracture  Assessment & Plan:  New dx, reviewed DEXA, reviewed med options, Prolia ordered   5. Bilateral hearing loss, unspecified hearing loss type  6. White coat syndrome with hypertension  Assessment & Plan:  well controlled, continue current treatment pending review of labs as she is taking her medication(s) as directed & without any side effects. Orders:  -     METABOLIC PANEL, COMPREHENSIVE; Future  -     CBC W/O DIFF; Future  7. Malignant neoplasm of ovary, unspecified laterality ([address]  Assessment & Plan:   monitored by specialist. No acute findings meriting change in the plan  8. Mixed conductive and sensorineural hearing loss of both ears  Assessment & Plan:  Having some issues, no cerumen, encouraged her to d/w audiologist, offered her 2nd opinion if she is not getting answers she needs     Follow-up and Dispositions    · Return in about 1 year (around 7/20/2022) for FULL PHYSICAL - 30 minutes. Subjective:   Danica is here today for a full physical.      Annual Wellness Visit- Subsequent Visit       End of Life Planning: She has an advanced directive - a copy HAS NOT been provided. Reviewed DNR/DNI and patient is not interested- elects Full Code (attempt resuscitation). She has a lot of questions and is leaning toward a DNR. Depression Screen:  3 most recent PHQ Screens 7/20/2021   Little interest or pleasure in doing things Not at all   Feeling down, depressed, irritable, or hopeless Not at all   Total Score PHQ 2 0         Fall Risk:   Fall Risk Assessment, last 12 mths 7/20/2021   Able to walk?  Yes   Fall in past 12 months? 0   Do you feel unsteady? 0   Are you worried about falling 0       Abuse Screen:  Abuse Screening Questionnaire 7/20/2021   Do you ever feel afraid of your partner? N   Are you in a relationship with someone who physically or mentally threatens you? N   Is it safe for you to go home? Y       Do you average more than 1 drink per night or more than 7 drinks a week:  No    On any one occasion in the past three months have you have had more than 3 drinks containing alcohol:  No    Health Maintenance:   Daily Aspirin: no  Bone Density: done 9/30/20 - osteoporosis (forearm only)    Immunizations:   Covid: up to date  Influenza: will plan on getting it this fall  Tetanus: up to date  Shingles: up to date  Pneumonia: up to date  Cancer screening:   Cervical: reviewed guidelines, n/a - s/p hysterectomy  Breast: reviewed guidelines, up to date  Colon: reviewed guidelines, coming up due, reviewed previous c-scope, risks/benefits of getting it, she is leaning toward repeat, encouraged her to d/w GI. Functional Ability and Level of Safety:    Hearing: The patient wears hearing aids. Cognition Screen:   Has your family/caregiver stated any concerns about your memory: no  Cognitive Screening: Normal - Clock Drawing Test     Ambulation: with no difficulty     Activities of Daily Living: The home contains: handrails and grab bars  Patient does total self care  Exercise: very active    Adult Nutrition Screen:  No risk factors noted.        Patient Care Team:  Fortunato Diego MD as PCP - General (Internal Medicine)  Fortunato Diego MD as PCP - [facility] Empaneled Provider  Lori Clements MD as Physician (Gastroenterology)  Analisa Abernathy Washington (Optometry)  Ning Mccormick MD as Physician (Female Pelvic Medicine and Reconstructive Surgery)  Delio Judd MD as Physician (Dermatology)  Melody Nguyễn MD (Cardiology)       The following sections were reviewed & updated as appropriate: Problem List, Allergies, PMH, PSH, FH, and . Prior to Admission medications    Medication Sig Start Date End Date Taking? Authorizing Provider   fexofenadine (ALLEGRA) 180 mg tablet Take 180 mg by mouth daily. Yes Provider, Historical   amLODIPine (NORVASC) 5 mg tablet TAKE 1 TABLET DAILY 7/9/21  Yes Lalo Breen MD   OTHER Nose spray for allergies, not steroid, has glaucoma   Yes Provider, Historical   LUMIGAN 0.01 % ophthalmic drops  2/8/18  Yes Provider, Historical   ESTRACE 0.01 % (0.1 mg/gram) vaginal cream  6/27/17  Yes Provider, Historical   cholecalciferol (VITAMIN D3) 1,000 unit tablet Take  by mouth daily. Yes Provider, Historical   CALCIUM CARBONATE/VITAMIN D3 (CALCIUM + D PO) Take  by mouth daily. Yes Provider, Historical   CARBOXYMETHYLCELLULOSE SODIUM (THERA TEARS OP) Apply  to eye four (4) times daily. Yes Provider, Historical          Review of Systems   All other systems reviewed and are negative. Objective:   Physical Exam  Constitutional:       Appearance: She is obese. Cardiovascular:      Rate and Rhythm: Regular rhythm. Heart sounds: Normal heart sounds. No murmur heard. Pulmonary:      Effort: Pulmonary effort is normal.      Breath sounds: Normal breath sounds. No wheezing or rales. Abdominal:      General: Bowel sounds are normal.      Palpations: There is no mass. Tenderness: There is no abdominal tenderness. Musculoskeletal:      Right lower leg: No edema. Left lower leg: No edema.    Psychiatric:         Mood and Affect: Mood normal.         Behavior: Behavior normal.            Visit Vitals  /68   Pulse 63   Temp 97.9 °F (36.6 °C) (Temporal)   Resp 18   Ht 5' 4.9\" (1.648 m)   Wt 180 lb (81.6 kg)   SpO2 97%   BMI 30.05 kg/m²          Ansley Wilson MD

## 2021-07-20 NOTE — PATIENT INSTRUCTIONS
Hearing Evaluations:    [facility]   [email or web address]    [phone] --> [address] [address]  [phone] --> [address]  ([place])  [phone] --> [address]  ([facility]      [address]    [phone]  --> [address], [facility]. [facility], [address]         Medicare Wellness Visit, Female     The best way to live healthy is to have a lifestyle where you eat a well-balanced diet, exercise regularly, limit alcohol use, and quit all forms of tobacco/nicotine, if applicable. Regular preventive services are another way to keep healthy. Preventive services (vaccines, screening tests, monitoring & exams) can help personalize your care plan, which helps you manage your own care. Screening tests can find health problems at the earliest stages, when they are easiest to treat. [facility] follows the current, evidence-based guidelines published by the [facility] Wilmar Raman ([facility]) when recommending preventive services for our patients. Because we follow these guidelines, sometimes recommendations change over time as research supports it. (For example, mammograms used to be recommended annually. Even though Medicare will still pay for an annual mammogram, the newer guidelines recommend a mammogram every two years for women of average risk). Of course, you and your doctor may decide to screen more often for some diseases, based on your risk and your co-morbidities (chronic disease you are already diagnosed with). Preventive services for you include:  - Medicare offers their members a free annual wellness visit, which is time for you and your primary care provider to discuss and plan for your preventive service needs. Take advantage of this benefit every year!  -All adults over the age of 72 should receive the recommended pneumonia vaccines.  Current USPSTF guidelines recommend a series of two vaccines for the best pneumonia protection.   -All adults should have a flu vaccine yearly and a tetanus vaccine every 10 years.   -All adults age 48 and older should receive the shingles vaccines (series of two vaccines). -All adults age 38-68 who are overweight should have a diabetes screening test once every three years.   -All adults born between [birth year] and 1963 should be screened once for Hepatitis C.  -Other screening tests and preventive services for persons with diabetes include: an eye exam to screen for diabetic retinopathy, a kidney function test, a foot exam, and stricter control over your cholesterol.   -Cardiovascular screening for adults with routine risk involves an electrocardiogram (ECG) at intervals determined by your doctor.   -Colorectal cancer screenings should be done for adults age 54-65 with no increased risk factors for colorectal cancer. There are a number of acceptable methods of screening for this type of cancer. Each test has its own benefits and drawbacks. Discuss with your doctor what is most appropriate for you during your annual wellness visit. The different tests include: colonoscopy (considered the best screening method), a fecal occult blood test, a fecal DNA test, and sigmoidoscopy.    -A bone mass density test is recommended when a woman turns 65 to screen for osteoporosis. This test is only recommended one time, as a screening. Some providers will use this same test as a disease monitoring tool if you already have osteoporosis. -Breast cancer screenings are recommended every other year for women of normal risk, age 54-69.  -Cervical cancer screenings for women over age 72 are only recommended with certain risk factors. Here is a list of your current Health Maintenance items (your personalized list of preventive services) with a due date: There are no preventive care reminders to display for this patient. Learning About Living [facility]  What is a living will?   A living will is a legal form you use to write down the kind of care you want at the end of your life. It is used by the health professionals who will treat you if you aren't able to decide for yourself. If you put your wishes in writing, your loved ones and others will know what kind of care you want. They won't need to guess. This can ease your mind and be helpful to others. A living will is not the same as an estate or property will. An estate will explains what you want to happen with your money and property after you die. Is a living will a legal document? A living will is a legal document. Each state has its own laws about living [place]. If you move to another state, make sure that your living will is legal in the state where you now live. Or you might use a universal form that has been approved by many states. This kind of form can sometimes be completed and stored online. Your electronic copy will then be available wherever you have a connection to the Internet. In most cases, doctors will respect your wishes even if you have a form from a different state. · You don't need an  to complete a living will. But legal advice can be helpful if your state's laws are unclear, your health history is complicated, or your family can't agree on what should be in your living will. · You can change your living will at any time. Some people find that their wishes about end-of-life care change as their health changes. · In addition to making a living will, think about completing a medical power of  form. This form lets you name the person you want to make end-of-life treatment decisions for you (your \"health care agent\") if you're not able to. Many hospitals and nursing homes will give you the forms you need to complete a living will and a medical power of . · Your living will is used only if you can't make or communicate decisions for yourself anymore.  If you become able to make decisions again, you can accept or refuse any treatment, no matter what you wrote in your living will. · Your state may offer an online registry. This is a place where you can store your living will online so the doctors and nurses who need to treat you can find it right away. What should you think about when creating a living will? Talk about your end-of-life wishes with your family members and your doctor. Let them know what you want. That way the people making decisions for you won't be surprised by your choices. Think about these questions as you make your living will:  · Do you know enough about life support methods that might be used? If not, talk to your doctor so you know what might be done if you can't breathe on your own, your heart stops, or you're unable to swallow. · What things would you still want to be able to do after you receive life-support methods? Would you want to be able to walk? To speak? To eat on your own? To live without the help of machines? · If you have a choice, where do you want to be cared for? In your home? At a hospital or nursing home? · Do you want certain Latter day practices performed if you become very ill? · If you have a choice at the end of your life, where would you prefer to die? At home? In a hospital or nursing home? Somewhere else? · Would you prefer to be buried or cremated? · Do you want your organs to be donated after you die? What should you do with your living will? · Make sure that your family members and your health care agent have copies of your living will. · Give your doctor a copy of your living will to keep in your medical record. If you have more than one doctor, make sure that each one has a copy. · You may want to put a copy of your living will where it can be easily found. Where can you learn more? Go to http://www.gray.com/. Enter J550 in the search box to learn more about \"Learning About Living [facility]. \"  Current as of: August 8, 2016  Content Version: 11.3  © 9992-6394 [employer]. Care instructions adapted under license by [employer] (which disclaims liability or warranty for this information). If you have questions about a medical condition or this instruction, always ask your healthcare professional. [address] any warranty or liability for your use of this information.

## 2021-07-20 NOTE — ASSESSMENT & PLAN NOTE
well controlled, continue current treatment pending review of labs as she is taking her medication(s) as directed & without any side effects.

## 2021-07-20 NOTE — ACP (ADVANCE CARE PLANNING)
Advance Care Planning   Advance Care Planning (ACP) Physician/NP/PA (Provider) Conversation    Date of ACP Conversation: 07/20/21  Persons included in Conversation:  patient  Length of ACP Conversation in minutes: 16 minutes    Authorized Decision Maker (if patient is incapable of making informed decisions):   Named in Advance Directive or Healthcare Power of       Primary Decision Maker: Cristina Gan - Daughter - [phone]    She has an advanced directive - a copy HAS NOT been provided. Reviewed DNR/DNI and patient is not interested- elects Full Code (attempt resuscitation). She has a lot of questions and is leaning toward a DNR.        Debra Combs MD

## 2021-07-20 NOTE — ASSESSMENT & PLAN NOTE
Having some issues, no cerumen, encouraged her to d/w audiologist, offered her 2nd opinion if she is not getting answers she needs

## 2021-09-24 DIAGNOSIS — R03.0 WHITE COAT SYNDROME WITHOUT HYPERTENSION: ICD-10-CM

## 2021-09-25 RX ORDER — AMLODIPINE BESYLATE 5 MG/1
TABLET ORAL
Qty: 90 TABLET | Refills: 2 | Status: SHIPPED | OUTPATIENT
Start: 2021-09-25 | End: 2022-07-31

## 2022-01-19 DIAGNOSIS — M81.6 LOCALIZED OSTEOPOROSIS WITHOUT CURRENT PATHOLOGICAL FRACTURE: ICD-10-CM

## 2022-03-01 ENCOUNTER — OFFICE VISIT (OUTPATIENT)
Dept: INTERNAL MEDICINE CLINIC | Age: 87
End: 2022-03-01
Payer: MEDICARE

## 2022-03-01 VITALS
HEART RATE: 84 BPM | SYSTOLIC BLOOD PRESSURE: 150 MMHG | TEMPERATURE: 97.8 F | DIASTOLIC BLOOD PRESSURE: 86 MMHG | RESPIRATION RATE: 16 BRPM | HEIGHT: 66 IN | BODY MASS INDEX: 28.77 KG/M2 | WEIGHT: 179 LBS | OXYGEN SATURATION: 97 %

## 2022-03-01 DIAGNOSIS — K59.00 CONSTIPATION, UNSPECIFIED CONSTIPATION TYPE: Primary | ICD-10-CM

## 2022-03-01 DIAGNOSIS — I10 WHITE COAT SYNDROME WITH HYPERTENSION: ICD-10-CM

## 2022-03-01 PROCEDURE — 1090F PRES/ABSN URINE INCON ASSESS: CPT | Performed by: INTERNAL MEDICINE

## 2022-03-01 PROCEDURE — G0463 HOSPITAL OUTPT CLINIC VISIT: HCPCS | Performed by: INTERNAL MEDICINE

## 2022-03-01 PROCEDURE — G8510 SCR DEP NEG, NO PLAN REQD: HCPCS | Performed by: INTERNAL MEDICINE

## 2022-03-01 PROCEDURE — G8536 NO DOC ELDER MAL SCRN: HCPCS | Performed by: INTERNAL MEDICINE

## 2022-03-01 PROCEDURE — G8754 DIAS BP LESS 90: HCPCS | Performed by: INTERNAL MEDICINE

## 2022-03-01 PROCEDURE — G8427 DOCREV CUR MEDS BY ELIG CLIN: HCPCS | Performed by: INTERNAL MEDICINE

## 2022-03-01 PROCEDURE — G8417 CALC BMI ABV UP PARAM F/U: HCPCS | Performed by: INTERNAL MEDICINE

## 2022-03-01 PROCEDURE — 1101F PT FALLS ASSESS-DOCD LE1/YR: CPT | Performed by: INTERNAL MEDICINE

## 2022-03-01 PROCEDURE — 99212 OFFICE O/P EST SF 10 MIN: CPT | Performed by: INTERNAL MEDICINE

## 2022-03-01 PROCEDURE — G8753 SYS BP > OR = 140: HCPCS | Performed by: INTERNAL MEDICINE

## 2022-03-01 NOTE — PATIENT INSTRUCTIONS
Milk of Magnesia (MOM)         Constipation: Care Instructions  Your Care Instructions     Constipation means that you have a hard time passing stools (bowel movements). People pass stools from 3 times a day to once every 3 days. What is normal for you may be different. Constipation may occur with pain in the rectum and cramping. The pain may get worse when you try to pass stools. Sometimes there are small amounts of bright red blood on toilet paper or the surface of stools. This is because of enlarged veins near the rectum (hemorrhoids). A few changes in your diet and lifestyle may help you avoid ongoing constipation. Your doctor may also prescribe medicine to help loosen your stool. Some medicines can cause constipation. These include pain medicines and antidepressants. Tell your doctor about all the medicines you take. Your doctor may want to make a medicine change to ease your symptoms. Follow-up care is a key part of your treatment and safety. Be sure to make and go to all appointments, and call your doctor if you are having problems. It's also a good idea to know your test results and keep a list of the medicines you take. How can you care for yourself at home? · Drink plenty of fluids. If you have kidney, heart, or liver disease and have to limit fluids, talk with your doctor before you increase the amount of fluids you drink. · Include high-fiber foods in your diet each day. These include fruits, vegetables, beans, and whole grains. · Get at least 30 minutes of exercise on most days of the week. Walking is a good choice. You also may want to do other activities, such as running, swimming, cycling, or playing tennis or team sports. · Take a fiber supplement, such as Citrucel or Metamucil, every day. Read and follow all instructions on the label. · Schedule time each day for a bowel movement. A daily routine may help. Take your time having your bowel movement.   · Support your feet with a small step stool when you sit on the toilet. This helps flex your hips and places your pelvis in a squatting position. · Your doctor may recommend an over-the-counter laxative to relieve your constipation. Examples are Milk of Magnesia and MiraLax. Read and follow all instructions on the label. Do not use laxatives on a long-term basis. When should you call for help? Call your doctor now or seek immediate medical care if:    · You have new or worse belly pain.     · You have new or worse nausea or vomiting.     · You have blood in your stools. Watch closely for changes in your health, and be sure to contact your doctor if:    · Your constipation is getting worse.     · You do not get better as expected. Where can you learn more? Go to http://www.rodriguez.com/  Enter P343 in the search box to learn more about \"Constipation: Care Instructions. \"  Current as of: July 1, 2021               Content Version: 13.0  © 2006-2021 [employer]. Care instructions adapted under license by [employer] (which disclaims liability or warranty for this information). If you have questions about a medical condition or this instruction, always ask your healthcare professional. [address] any warranty or liability for your use of this information.

## 2022-03-01 NOTE — PROGRESS NOTES
Jerome Lagos is a 82 y.o. female who was seen in clinic today (3/1/2022) for an acute visit. Assessment & Plan:   Below is the assessment and plan developed based on review of pertinent history, physical exam, labs, studies, and medications. 1. Constipation, unspecified constipation type  Comments:  recurrent issue, differential reviewed, no red flag. Will start w/ MOM. Reviewed expectations. If no changes will need CT. Did review when to see GI  2. White coat syndrome with hypertension  Assessment & Plan:  Elevated today, here for acute visit, encouraged to check BP at home. She has f/u this summer. She is due for f/u colonoscopy but at her age if this resolves will hold off. If no improvement then will need to see GI. Follow-up and Dispositions    · Return if symptoms worsen or fail to improve. Subjective/Objective:   Danica was seen today for Constipation (onset over two weeks )    Constipation  Patient complains of constipation. Onset was 2 weeks ago. Defecation has been easy, no straining or pain. There is no blood in the stool, no mucous. No diet changes. No new medications, including OTC/herbal medications. No nausea, vomiting, or abdominal pain. She tried prunes, prune juice, and Mirilax. She reports having a daily BM, but it is just small pieces. She reports this morning she had a larger BM but not normal.  It is soft and coming out in slivers/pieces. She was seen in 2019 for constipation, but it was different. CT scan at that time was w/o significant abnormalities. Last colonoscopy was done on 8/2/15, was supposed to repeat in 5 yrs        Prior to Admission medications    Medication Sig Start Date End Date Taking? Authorizing Provider   amLODIPine (NORVASC) 5 mg tablet TAKE 1 TABLET DAILY 9/25/21  Yes Rocky Daniels MD   fexofenadine (ALLEGRA) 180 mg tablet Take 180 mg by mouth daily.    Yes Provider, Historical   OTHER Nose spray for allergies, not steroid, has glaucoma   Yes Provider, Historical   LUMIGAN 0.01 % ophthalmic drops  2/8/18  Yes Provider, Historical   ESTRACE 0.01 % (0.1 mg/gram) vaginal cream  6/27/17  Yes Provider, Historical   cholecalciferol (VITAMIN D3) 1,000 unit tablet Take  by mouth daily. Yes Provider, Historical   CALCIUM CARBONATE/VITAMIN D3 (CALCIUM + D PO) Take  by mouth daily. Yes Provider, Historical   CARBOXYMETHYLCELLULOSE SODIUM (THERA TEARS OP) Apply  to eye four (4) times daily. Yes Provider, Historical           Physical Exam  Cardiovascular:      Rate and Rhythm: Regular rhythm. Heart sounds: Normal heart sounds. No murmur heard. Pulmonary:      Effort: Pulmonary effort is normal.      Breath sounds: Normal breath sounds. No wheezing or rales. Abdominal:      General: Bowel sounds are increased. There is no distension. Palpations: There is no hepatomegaly, splenomegaly or mass. Tenderness: There is no abdominal tenderness. There is no guarding or rebound. Visit Vitals  BP (!) 150/86   Pulse 84   Temp 97.8 °F (36.6 °C) (Temporal)   Resp 16   Ht 5' 5.5\" (1.664 m)   Wt 179 lb (81.2 kg)   SpO2 97%   BMI 29.33 kg/m²         Advised her to call back or return to office if symptoms worsen/change/persist.  Discussed expected course/resolution/complications of diagnosis in detail with patient. Medication risks/benefits/costs/interactions/alternatives discussed with patient.     Romero Godinez MD

## 2022-03-18 PROBLEM — E66.3 OVERWEIGHT (BMI 25.0-29.9): Status: ACTIVE | Noted: 2018-05-16

## 2022-03-19 PROBLEM — H90.6 MIXED CONDUCTIVE AND SENSORINEURAL HEARING LOSS OF BOTH EARS: Status: ACTIVE | Noted: 2018-05-16

## 2022-03-19 PROBLEM — J30.2 SEASONAL ALLERGIC RHINITIS: Status: ACTIVE | Noted: 2018-05-16

## 2022-03-19 PROBLEM — M81.6 LOCALIZED OSTEOPOROSIS WITHOUT CURRENT PATHOLOGICAL FRACTURE: Status: ACTIVE | Noted: 2018-05-24

## 2022-03-19 PROBLEM — I10 WHITE COAT SYNDROME WITH HYPERTENSION: Status: ACTIVE | Noted: 2018-09-07

## 2022-06-20 ENCOUNTER — OFFICE VISIT (OUTPATIENT)
Dept: INTERNAL MEDICINE CLINIC | Age: 87
End: 2022-06-20
Payer: MEDICARE

## 2022-06-20 VITALS
OXYGEN SATURATION: 96 % | HEIGHT: 65 IN | HEART RATE: 77 BPM | DIASTOLIC BLOOD PRESSURE: 91 MMHG | RESPIRATION RATE: 17 BRPM | BODY MASS INDEX: 29.22 KG/M2 | SYSTOLIC BLOOD PRESSURE: 162 MMHG | TEMPERATURE: 97.9 F | WEIGHT: 175.4 LBS

## 2022-06-20 DIAGNOSIS — R25.1 TREMOR: Primary | ICD-10-CM

## 2022-06-20 PROCEDURE — G8536 NO DOC ELDER MAL SCRN: HCPCS | Performed by: INTERNAL MEDICINE

## 2022-06-20 PROCEDURE — G8427 DOCREV CUR MEDS BY ELIG CLIN: HCPCS | Performed by: INTERNAL MEDICINE

## 2022-06-20 PROCEDURE — 99213 OFFICE O/P EST LOW 20 MIN: CPT | Performed by: INTERNAL MEDICINE

## 2022-06-20 PROCEDURE — G8755 DIAS BP > OR = 90: HCPCS | Performed by: INTERNAL MEDICINE

## 2022-06-20 PROCEDURE — G8753 SYS BP > OR = 140: HCPCS | Performed by: INTERNAL MEDICINE

## 2022-06-20 PROCEDURE — G8510 SCR DEP NEG, NO PLAN REQD: HCPCS | Performed by: INTERNAL MEDICINE

## 2022-06-20 PROCEDURE — 1090F PRES/ABSN URINE INCON ASSESS: CPT | Performed by: INTERNAL MEDICINE

## 2022-06-20 PROCEDURE — 1101F PT FALLS ASSESS-DOCD LE1/YR: CPT | Performed by: INTERNAL MEDICINE

## 2022-06-20 PROCEDURE — G8417 CALC BMI ABV UP PARAM F/U: HCPCS | Performed by: INTERNAL MEDICINE

## 2022-06-20 PROCEDURE — G0463 HOSPITAL OUTPT CLINIC VISIT: HCPCS | Performed by: INTERNAL MEDICINE

## 2022-06-20 NOTE — PROGRESS NOTES
Presley Olszewski is a 82 y.o. female who was seen in clinic today (6/20/2022) for an acute visit. Assessment & Plan:   Below is the assessment and plan developed based on review of pertinent history, physical exam, labs, studies, and medications. 1. Tremor  Comments:  new dx, differential reviewed, unclear. Reveiwed s/s of PD. She will look for effects with nightly wine. Will get her into see specialist. Red flags reviewed  Orders:  -     REFERRAL TO NEUROLOGY    Follow-up and Dispositions    · Return in about 1 month (around 7/20/2022) for CPE already scheduled. Subjective/Objective:   Danica was seen today for Tremors (right hand)    She reports she injured her R hand. She was cooking, hit a spoon, and injured her hand. She notices at times her 3rd & 4th finger are coming together. She notices a tremor in her hand. It is actually in her whole right arm. No other body parts effected  It started just over a year and it is getting more pronounced. It is impacting her writing and eating at times. She will see a resting tremor in the middle of the forearm and the thumb. No neck pain, no shoulder pain, no elbow pain, and no wrist pain. No numbness or tingling. No headaches or vision changes. Prior to Admission medications    Medication Sig Start Date End Date Taking? Authorizing Provider   amLODIPine (NORVASC) 5 mg tablet TAKE 1 TABLET DAILY 9/25/21  Yes John Bradshaw MD   fexofenadine (ALLEGRA) 180 mg tablet Take 180 mg by mouth daily. Yes Provider, Historical   OTHER Nose spray for allergies, not steroid, has glaucoma   Yes Provider, Historical   LUMIGAN 0.01 % ophthalmic drops  2/8/18  Yes Provider, Historical   ESTRACE 0.01 % (0.1 mg/gram) vaginal cream  6/27/17  Yes Provider, Historical   cholecalciferol (VITAMIN D3) 1,000 unit tablet Take  by mouth daily. Yes Provider, Historical   CALCIUM CARBONATE/VITAMIN D3 (CALCIUM + D PO) Take  by mouth daily.    Yes Provider, Historical   CARBOXYMETHYLCELLULOSE SODIUM (THERA TEARS OP) Apply  to eye four (4) times daily. Yes Provider, Historical           Physical Exam  Musculoskeletal:      Right shoulder: Normal range of motion. Normal strength. Left shoulder: Normal range of motion. Normal strength. Right hand: Normal strength. Left hand: Normal strength. Neurological:      Comments:        Non-rhythmic shaking of the R arm. Thumb with some tremor, not pill rolling. Strength is 5/5 in bilateral upper extremities. Intact to light touch in bilateral upper extremities. Visit Vitals  BP (!) 162/91 (BP 1 Location: Left arm, BP Patient Position: Sitting, BP Cuff Size: Adult)   Pulse 77   Temp 97.9 °F (36.6 °C) (Temporal)   Resp 17   Ht 5' 4.9\" (1.648 m)   Wt 175 lb 6.4 oz (79.6 kg)   SpO2 96%   BMI 29.28 kg/m²         Advised her to call back or return to office if symptoms worsen/change/persist.  Discussed expected course/resolution/complications of diagnosis in detail with patient. Medication risks/benefits/costs/interactions/alternatives discussed with patient.     Evan Hackett MD

## 2022-07-08 DIAGNOSIS — M81.6 LOCALIZED OSTEOPOROSIS WITHOUT CURRENT PATHOLOGICAL FRACTURE: Primary | ICD-10-CM

## 2022-07-25 ENCOUNTER — OFFICE VISIT (OUTPATIENT)
Dept: INTERNAL MEDICINE CLINIC | Age: 87
End: 2022-07-25
Payer: MEDICARE

## 2022-07-25 VITALS
WEIGHT: 176.8 LBS | TEMPERATURE: 97.8 F | HEART RATE: 77 BPM | SYSTOLIC BLOOD PRESSURE: 143 MMHG | HEIGHT: 65 IN | DIASTOLIC BLOOD PRESSURE: 82 MMHG | RESPIRATION RATE: 18 BRPM | OXYGEN SATURATION: 98 % | BODY MASS INDEX: 29.46 KG/M2

## 2022-07-25 DIAGNOSIS — Z86.010 HISTORY OF COLON POLYPS: ICD-10-CM

## 2022-07-25 DIAGNOSIS — M81.6 LOCALIZED OSTEOPOROSIS WITHOUT CURRENT PATHOLOGICAL FRACTURE: ICD-10-CM

## 2022-07-25 DIAGNOSIS — Z71.89 ADVANCED CARE PLANNING/COUNSELING DISCUSSION: ICD-10-CM

## 2022-07-25 DIAGNOSIS — I10 WHITE COAT SYNDROME WITH HYPERTENSION: ICD-10-CM

## 2022-07-25 DIAGNOSIS — M79.604 RIGHT LEG PAIN: ICD-10-CM

## 2022-07-25 DIAGNOSIS — Z00.00 MEDICARE ANNUAL WELLNESS VISIT, SUBSEQUENT: Primary | ICD-10-CM

## 2022-07-25 DIAGNOSIS — C56.9 MALIGNANT NEOPLASM OF OVARY, UNSPECIFIED LATERALITY (HCC): ICD-10-CM

## 2022-07-25 PROBLEM — Z86.0100 HISTORY OF COLON POLYPS: Status: ACTIVE | Noted: 2022-07-25

## 2022-07-25 LAB
ALBUMIN SERPL-MCNC: 3.7 G/DL (ref 3.5–5)
ALBUMIN/GLOB SERPL: 1.2 {RATIO} (ref 1.1–2.2)
ALP SERPL-CCNC: 74 U/L (ref 45–117)
ALT SERPL-CCNC: 18 U/L (ref 12–78)
ANION GAP SERPL CALC-SCNC: 6 MMOL/L (ref 5–15)
AST SERPL-CCNC: 16 U/L (ref 15–37)
BILIRUB SERPL-MCNC: 0.4 MG/DL (ref 0.2–1)
BUN SERPL-MCNC: 16 MG/DL (ref 6–20)
BUN/CREAT SERPL: 24 (ref 12–20)
CALCIUM SERPL-MCNC: 9.6 MG/DL (ref 8.5–10.1)
CHLORIDE SERPL-SCNC: 105 MMOL/L (ref 97–108)
CO2 SERPL-SCNC: 29 MMOL/L (ref 21–32)
CREAT SERPL-MCNC: 0.68 MG/DL (ref 0.55–1.02)
ERYTHROCYTE [DISTWIDTH] IN BLOOD BY AUTOMATED COUNT: 12.4 % (ref 11.5–14.5)
GLOBULIN SER CALC-MCNC: 3.2 G/DL (ref 2–4)
GLUCOSE SERPL-MCNC: 95 MG/DL (ref 65–100)
HCT VFR BLD AUTO: 43.1 % (ref 35–47)
HGB BLD-MCNC: 14.4 G/DL (ref 11.5–16)
MCH RBC QN AUTO: 32.7 PG (ref 26–34)
MCHC RBC AUTO-ENTMCNC: 33.4 G/DL (ref 30–36.5)
MCV RBC AUTO: 97.7 FL (ref 80–99)
NRBC # BLD: 0 K/UL (ref 0–0.01)
NRBC BLD-RTO: 0 PER 100 WBC
PLATELET # BLD AUTO: 342 K/UL (ref 150–400)
PMV BLD AUTO: 9.9 FL (ref 8.9–12.9)
POTASSIUM SERPL-SCNC: 4.6 MMOL/L (ref 3.5–5.1)
PROT SERPL-MCNC: 6.9 G/DL (ref 6.4–8.2)
RBC # BLD AUTO: 4.41 M/UL (ref 3.8–5.2)
SODIUM SERPL-SCNC: 140 MMOL/L (ref 136–145)
WBC # BLD AUTO: 6.3 K/UL (ref 3.6–11)

## 2022-07-25 PROCEDURE — G8754 DIAS BP LESS 90: HCPCS | Performed by: INTERNAL MEDICINE

## 2022-07-25 PROCEDURE — G8510 SCR DEP NEG, NO PLAN REQD: HCPCS | Performed by: INTERNAL MEDICINE

## 2022-07-25 PROCEDURE — 1101F PT FALLS ASSESS-DOCD LE1/YR: CPT | Performed by: INTERNAL MEDICINE

## 2022-07-25 PROCEDURE — G8536 NO DOC ELDER MAL SCRN: HCPCS | Performed by: INTERNAL MEDICINE

## 2022-07-25 PROCEDURE — G8427 DOCREV CUR MEDS BY ELIG CLIN: HCPCS | Performed by: INTERNAL MEDICINE

## 2022-07-25 PROCEDURE — G0439 PPPS, SUBSEQ VISIT: HCPCS | Performed by: INTERNAL MEDICINE

## 2022-07-25 PROCEDURE — G8417 CALC BMI ABV UP PARAM F/U: HCPCS | Performed by: INTERNAL MEDICINE

## 2022-07-25 PROCEDURE — G8753 SYS BP > OR = 140: HCPCS | Performed by: INTERNAL MEDICINE

## 2022-07-25 NOTE — PATIENT INSTRUCTIONS

## 2022-07-25 NOTE — ASSESSMENT & PLAN NOTE
Due for f/u colonoscopy due to h/o tubular adenoma, reviewed guidelines and recommendations, reviewed options based on age, she wants to get the colonoscopy done one more time and she will contact GI to schedule

## 2022-07-25 NOTE — PROGRESS NOTES
Kristel Sarkar is a 82 y.o. female who was seen in clinic today (7/25/2022) for a full physical.             Assessment & Plan:   Below is the assessment and plan developed based on review of pertinent history, physical exam, labs, studies, and medications. 1. Medicare annual wellness visit, subsequent  2. Advanced care planning/counseling discussion  3. White coat syndrome with hypertension  Assessment & Plan: At her baseline, reviewed ideal BP readings, improved at home in the past, no changes   Orders:  -     METABOLIC PANEL, COMPREHENSIVE; Future  -     CBC W/O DIFF; Future  4. Localized osteoporosis without current pathological fracture  Assessment & Plan:  Asymptomatic, reviewed treatment options, declined any medications   Orders:  -     METABOLIC PANEL, COMPREHENSIVE; Future  -     CBC W/O DIFF; Future  5. Malignant neoplasm of ovary, unspecified laterality ([facility] [address]  Assessment & Plan:   monitored by specialist. No acute findings meriting change in the plan  6. History of colon polyps  Assessment & Plan:  Due for f/u colonoscopy due to h/o tubular adenoma, reviewed guidelines and recommendations, reviewed options based on age, she wants to get the colonoscopy done one more time and she will contact GI to schedule   7. Right leg pain  Comments:  chronic issue, differential reviewed, likely multi-factorial but favor OA. Offered imaging & PT but declined. Red flags & life style changes reviewed    Follow-up and Dispositions    Return in about 1 year (around 7/25/2023) for FULL PHYSICAL. Subjective:   [facility]      Annual Wellness Visit- Subsequent Visit     The following acute and/or chronic problems were addressed today:    Cardiovascular Review  The patient has hypertension. She reports taking medications as instructed, no medication side effects noted. She generally follows a low sodium diet.         End of Life Planning: She has an advanced directive - a copy HAS NOT been provided. Reviewed DNR/DNI and patient is not interested- elects Full Code (attempt resuscitation). She has a lot of questions and is leaning toward a DNR. Depression Screen:  3 most recent PHQ Screens 7/25/2022   Little interest or pleasure in doing things Not at all   Feeling down, depressed, irritable, or hopeless Not at all   Total Score PHQ 2 0         Fall Risk:   Fall Risk Assessment, last 12 mths 7/25/2022   Able to walk? Yes   Fall in past 12 months? 0   Do you feel unsteady? 0   Are you worried about falling 0       Abuse Screen:  Abuse Screening Questionnaire 7/25/2022   Do you ever feel afraid of your partner? N   Are you in a relationship with someone who physically or mentally threatens you? N   Is it safe for you to go home? Y       Do you average more than 1 drink per night or more than 7 drinks a week:  No    On any one occasion in the past three months have you have had more than 3 drinks containing alcohol:  No    Health Maintenance:   Daily Aspirin: no  Bone Density: done 9/30/20 - osteoporosis (forearm only)    Immunizations:   Covid: up to date  Influenza: will plan on getting it this fall  Tetanus: up to date  Shingles: up to date  Pneumonia: up to date  Cancer screening:   Cervical: reviewed guidelines, n/a - s/p hysterectomy  Breast: reviewed guidelines, up to date  Colon: reviewed guidelines, last done '15 (tubular adenoma), she will contact GI to shceudle     Functional Ability and Level of Safety:    Hearing: The patient wears hearing aids. Cognition Screen:   Has your family/caregiver stated any concerns about your memory: no  Cognitive Screening: Normal - Mini Cog Test      Ambulation: with no difficulty     Activities of Daily Living: The home contains: handrails and grab bars  Patient does total self care  Exercise: very active    Adult Nutrition Screen:  No risk factors noted.        Patient Care Team:  Timothy Quiñones MD as PCP - General (Internal Medicine Physician)  Keshia Wong MD as PCP - [facility]  Kanu Gonzalez MD as Physician (Gastroenterology)  Laurel Abernathy, [address] (Optometry)  Stephanie Forman MD as Physician (Female Pelvic Medicine and Reconstructive Surgery)  Rodger Bland MD as Physician (Dermatology Physician)  Gely Wan MD (Cardiovascular Disease Physician)       The following sections were reviewed & updated as appropriate: Problem List, Allergies, PMH, PSH, FH, and SH. Prior to Admission medications    Medication Sig Start Date End Date Taking? Authorizing Provider   amLODIPine (NORVASC) 5 mg tablet TAKE 1 TABLET DAILY 9/25/21  Yes Keshia Wong MD   fexofenadine (ALLEGRA) 180 mg tablet Take 180 mg by mouth daily. Yes Provider, Historical   OTHER Nose spray for allergies, not steroid, has glaucoma   Yes Provider, Historical   LUMIGAN 0.01 % ophthalmic drops  2/8/18  Yes Provider, Historical   ESTRACE 0.01 % (0.1 mg/gram) vaginal cream  6/27/17  Yes Provider, Historical   cholecalciferol (VITAMIN D3) (1000 Units /25 mcg) tablet Take  by mouth daily. Yes Provider, Historical   CALCIUM CARBONATE/VITAMIN D3 (CALCIUM + D PO) Take  by mouth daily. Yes Provider, Historical   CARBOXYMETHYLCELLULOSE SODIUM (THERA TEARS OP) Apply  to eye four (4) times daily. Yes Provider, Historical          Review of Systems   Constitutional:  Negative for malaise/fatigue and weight loss. Respiratory:  Negative for cough and shortness of breath. Cardiovascular:  Negative for chest pain, palpitations and leg swelling. Gastrointestinal:  Negative for abdominal pain, constipation, diarrhea, heartburn, nausea and vomiting. Musculoskeletal:  Positive for joint pain (R hip - feels like it is going to give out when going up stairs, no other issues. R knee on/off). Negative for myalgias. Skin:  Negative for rash.    Neurological:  Positive for tremors (no changes from last visit, she has been keeping a diary, seems to be more position related). Negative for dizziness and headaches. Psychiatric/Behavioral:  Negative for depression. The patient is not nervous/anxious and does not have insomnia. All other systems reviewed and are negative. Objective:   Physical Exam  Constitutional:       Appearance: She is obese. Cardiovascular:      Rate and Rhythm: Regular rhythm. Heart sounds: Normal heart sounds. No murmur heard. Pulmonary:      Effort: Pulmonary effort is normal.      Breath sounds: Normal breath sounds. No wheezing or rales. Abdominal:      General: Bowel sounds are normal.      Palpations: There is no mass. Tenderness: There is no abdominal tenderness. Musculoskeletal:      Right hip: Normal.      Right knee: No deformity or bony tenderness. Normal range of motion. Right lower leg: No edema. Left lower leg: No edema.    Psychiatric:         Mood and Affect: Mood normal.         Behavior: Behavior normal.          Visit Vitals  BP (!) 143/82 (BP 1 Location: Left arm, BP Patient Position: Sitting, BP Cuff Size: Adult)   Pulse 77   Temp 97.8 °F (36.6 °C) (Temporal)   Resp 18   Ht 5' 4.9\" (1.648 m)   Wt 176 lb 12.8 oz (80.2 kg)   SpO2 98%   BMI 29.51 kg/m²          Cari Kerr MD

## 2022-07-25 NOTE — ACP (ADVANCE CARE PLANNING)
Advance Care Planning   Advance Care Planning (ACP) Physician/NP/PA (Provider) Conversation    Date of ACP Conversation: 07/25/22  Persons included in Conversation:  patient  Length of ACP Conversation in minutes: <16 minutes (Non-Billable)    Authorized Decision Maker (if patient is incapable of making informed decisions):   Named in Advance Directive or Healthcare Power of       Primary Decision Maker: Cortney Ramos - Daughter - [phone]    She has an advanced directive - a copy HAS NOT been provided. Reviewed DNR/DNI and patient is not interested- elects Full Code (attempt resuscitation).        Lee Ann Dinero MD

## 2022-07-30 DIAGNOSIS — R03.0 WHITE COAT SYNDROME WITHOUT HYPERTENSION: ICD-10-CM

## 2022-07-31 RX ORDER — AMLODIPINE BESYLATE 5 MG/1
TABLET ORAL
Qty: 90 TABLET | Refills: 3 | Status: SHIPPED | OUTPATIENT
Start: 2022-07-31

## 2022-08-01 NOTE — TELEPHONE ENCOUNTER
Future Appointments   Date Time Provider Jaun Perez   9/27/2022  1:00 PM DO GEE Rogers BS AMB   7/25/2023 10:20 AM Ofelia Mederos MD [facility]DELRA[facility] BS AMB

## 2022-09-22 ENCOUNTER — DOCUMENTATION ONLY (OUTPATIENT)
Dept: INTERNAL MEDICINE CLINIC | Age: 87
End: 2022-09-22

## 2022-09-22 NOTE — PROGRESS NOTES
Faxed referral/ order [facility] [address] [place] @ [phone]. Received confirmation same day. Will have documents scanned into chart. . please advise Mary Gillespie

## 2022-09-27 ENCOUNTER — OFFICE VISIT (OUTPATIENT)
Dept: NEUROLOGY | Age: 87
End: 2022-09-27
Payer: MEDICARE

## 2022-09-27 VITALS
OXYGEN SATURATION: 97 % | BODY MASS INDEX: 27.97 KG/M2 | SYSTOLIC BLOOD PRESSURE: 138 MMHG | HEIGHT: 66 IN | WEIGHT: 174 LBS | DIASTOLIC BLOOD PRESSURE: 72 MMHG | HEART RATE: 78 BPM

## 2022-09-27 DIAGNOSIS — G62.9 POLYNEUROPATHY: ICD-10-CM

## 2022-09-27 DIAGNOSIS — R20.9 SKIN SENSATION DISTURBANCE: ICD-10-CM

## 2022-09-27 DIAGNOSIS — R25.1 TREMOR OF RIGHT HAND: Primary | ICD-10-CM

## 2022-09-27 DIAGNOSIS — G20 PARKINSONIAN TREMOR (HCC): ICD-10-CM

## 2022-09-27 PROCEDURE — G8427 DOCREV CUR MEDS BY ELIG CLIN: HCPCS | Performed by: PSYCHIATRY & NEUROLOGY

## 2022-09-27 PROCEDURE — 1123F ACP DISCUSS/DSCN MKR DOCD: CPT | Performed by: PSYCHIATRY & NEUROLOGY

## 2022-09-27 PROCEDURE — 1101F PT FALLS ASSESS-DOCD LE1/YR: CPT | Performed by: PSYCHIATRY & NEUROLOGY

## 2022-09-27 PROCEDURE — G8536 NO DOC ELDER MAL SCRN: HCPCS | Performed by: PSYCHIATRY & NEUROLOGY

## 2022-09-27 PROCEDURE — 1090F PRES/ABSN URINE INCON ASSESS: CPT | Performed by: PSYCHIATRY & NEUROLOGY

## 2022-09-27 PROCEDURE — G8752 SYS BP LESS 140: HCPCS | Performed by: PSYCHIATRY & NEUROLOGY

## 2022-09-27 PROCEDURE — G8417 CALC BMI ABV UP PARAM F/U: HCPCS | Performed by: PSYCHIATRY & NEUROLOGY

## 2022-09-27 PROCEDURE — G8754 DIAS BP LESS 90: HCPCS | Performed by: PSYCHIATRY & NEUROLOGY

## 2022-09-27 PROCEDURE — G8510 SCR DEP NEG, NO PLAN REQD: HCPCS | Performed by: PSYCHIATRY & NEUROLOGY

## 2022-09-27 PROCEDURE — 99205 OFFICE O/P NEW HI 60 MIN: CPT | Performed by: PSYCHIATRY & NEUROLOGY

## 2022-09-27 RX ORDER — GLUCOSAMINE/CHONDR SU A SOD 750-600 MG
TABLET ORAL
COMMUNITY

## 2022-09-27 NOTE — PROGRESS NOTES
NEUROLOGY  NEW PATIENT EVALUATION/CONSULTATION       PATIENT NAME: Anthony Mitchell    MRN: [id number]    REASON FOR CONSULTATION: Tremor    09/27/22      Previous records (physician notes, laboratory reports, and radiology reports) and imaging studies were reviewed and summarized. My recommendations will be communicated back to the patient's physician(s) via electronic medical record and/or by [address] mail. HISTORY OF PRESENT ILLNESS:  Anthony Mitchell is a 82 y.o. right handed female presenting for evaluation of tremors involving the right hand. She has difficulty holding utensils or cups due to shaking which appears worse with activity. Tremors are also present at rest and progressive over the past year. She does recall a remote right hand injury while cooking over a year ago. Denies tremors involving the head, LUE or b/l LE. Motor:   Tremor-RUE primarily with activity>rest  Walking/Falls-denies falls  Micrographia-denies  Freezing/Bradykinesia-denies   Balance-denies imbalance  Non-motor:   Drooling-denies  Dysphagia-denies  Hypophonia-none  Anosmia-denies  Autonomic:  Urinary-+incontinence  Constipation-none  Orthostatic hypotension-none  Sleep-denies sleep disorder/RSBD  Cognitive/Memory-None  Behavioral/Psychiatric:   Hallucinations- None  Depression-None    The patient denies a history of exposure to neuroleptics, (Reglan, Phenergan, Compazine, no encephalitis/meningitis, no significant exposure to insecticides/ pesticides/ heavy metals/ carbon monoxide. Positive family history of paternal tremors.        Previous evaluations: None      PAST MEDICAL HISTORY:  Past Medical History:   Diagnosis Date    History of chemotherapy 2002    Taxol/carboplatin x 6    Mixed conductive and sensorineural hearing loss of both ears 5/16/2018    Ovarian cancer ([facility] [address] 2002    Seasonal allergic rhinitis 5/16/2018       PAST SURGICAL HISTORY:  Past Surgical History:   Procedure Laterality Date    HX BREAST BIOPSY  1983    HX CATARACT REMOVAL Bilateral 2016    with stent placement left eye    HX COLONOSCOPY N/A 08/02/2015    hyperplastic & tubular adenoma; diverticulosis, int hemorrhoids; repeat 5 years. HX TONSILLECTOMY  1943    HX TOTAL ABDOMINAL HYSTERECTOMY  2002    MARGARETTE/BSO, dilation and curratagex2    HX VASCULAR ACCESS  2002    INSERTION / REMOVAL       FAMILY HISTORY:   Family History   Problem Relation Age of Onset    Heart Disease Mother     Heart Disease Father     Breast Cancer Sister 80    OSTEOARTHRITIS Sister     Other Sister         lymphedema, carotid surgery    Dementia Sister     No Known Problems Daughter     Cancer Maternal Aunt         LUNG    Cancer Maternal Aunt         BREAST         SOCIAL HISTORY:  Social History     Socioeconomic History    Marital status:    Tobacco Use    Smoking status: Former    Smokeless tobacco: Never   Vaping Use    Vaping Use: Never used   Substance and Sexual Activity    Alcohol use: Yes     Alcohol/week: 4.0 standard drinks     Types: 4 Glasses of wine per week     Comment: wine 1 glass most every day    Drug use: No    Sexual activity: Not Currently     Partners: Male         MEDICATIONS:   Current Outpatient Medications   Medication Sig Dispense Refill    Biotin 2,500 mcg cap Take  by mouth. amLODIPine (NORVASC) 5 mg tablet TAKE 1 TABLET DAILY 90 Tablet 3    fexofenadine (ALLEGRA) 180 mg tablet Take 180 mg by mouth daily. LUMIGAN 0.01 % ophthalmic drops       ESTRACE 0.01 % (0.1 mg/gram) vaginal cream       cholecalciferol (VITAMIN D3) (1000 Units /25 mcg) tablet Take  by mouth daily. CALCIUM CARBONATE/VITAMIN D3 (CALCIUM + D PO) Take  by mouth daily. CARBOXYMETHYLCELLULOSE SODIUM (THERA TEARS OP) Apply  to eye four (4) times daily. ALLERGIES:  No Known Allergies      REVIEW OF SYSTEMS:  10 point ROS reviewed with patient. Please see scanned document under media.        PHYSICAL EXAM:  Vital Signs: Visit Vitals  /72 Pulse 78   Ht 5' 6\" (1.676 m)   Wt 174 lb (78.9 kg)   SpO2 97%   BMI 28.08 kg/m²        General Medical Exam:  General:  Well appearing, comfortable, in no apparent distress. Eyes/ENT: see cranial nerve examination. Neck: No masses appreciated. Full range of motion without tenderness. Respiratory:  Clear to auscultation, good air entry bilaterally. Cardiac:  Regular rate and rhythm, no murmur. GI:  Soft, non-tender, non-distended abdomen. Bowel sounds normal. No masses, organomegaly. Extremities:  No deformities, edema, or skin discoloration. Skin:  No rashes or lesions. Neurological:  Mental Status:  Alert and oriented to person, place, and time with fluent speech. Cranial Nerves:   CNII/III/IV/VI: visual fields full to confrontation, EOMI, PERRL, no ptosis or nystagmus. CN V: Facial sensation intact bilaterally, masseter 5/5   CN VII: Facial muscles symmetric and strong   CN VIII: Hears finger rub well bilaterally, intact vestibular function   CN IX/X: Normal palatal movement   CN XI: Full strength shoulder shrug bilaterally   CN XII: Tongue protrusion full and midline without fasciculation or atrophy  Motor: Normal tone and muscle bulk with no pronator drift. No atrophy or fasciculations present on examination.   Individual muscle group testing:  Shoulder abduction:   Left:5/5   Right : 5/5    Shoulder adduction:   Left:5/5   Right : 5/5    Elbow flexion:      Left:5/5   Right : 5/5  Elbow extension:    Left:5/5   Right : 5/5   Wrist flexion:    Left:5/5   Right : 5/5  Wrist extension:    Left:5/5   Right : 5/5  Arm pronation:   Left:5/5   Right : 5/5  Arm supination:   Left:5/5   Right : 5/5    Finger flexion:    Left:5/5   Right : 5/5    Finger extension:   Left:5/5   Right : 5/5   Finger abduction:  Left:5/5   Right : 5/5   Finger adduction:   Left:5/5   Right : 5/5  Hip flexion:     Left:5/5   Right : 5/5         Hip extension:   Left:5/5   Right : 5/5    Knee flexion:    Left:5/5   Right : 5/5    Knee extension:   Left:5/5   Right : 5/5    Dorsiflexion:     Left:5/5   Right : 5/5  Plantar flexion:    Left:5/5   Right : 5/5    Foot inversion:    Left:5/5   Right : 5/5   Foot eversion:    Left:5/5   Right : 5/5  Toe flexion:      Left:5/5   Right : 5/5          Toe extension:    Left:5/5   Right : 5/5    MSRs: No crossed adductors or clonus. RIGHT  LEFT   Brachioradialis 2+ 2+   Biceps 2+ 2+   Triceps 2+ 2+   Knee 2+ 2+   Achilles 0 0        Plantar response Downward Downward          Sensation: Absent vibration past knees b/l, decreased pinprick/temperature distal LE b/l, otherwise normal and symmetric perception of  light touch, proprioception; (-) Romberg. Coordination: No dysmetria. Normal rapid alternating movements; finger-to-nose and heel-to- shin testing are within normal limits. No bradykinesia, rigidity. +RUE moderate resting tremor with more subtle postural tremor. Gait: Normal native gait. ASSESSMENT:      ICD-10-CM ICD-9-CM    1. Tremor of right hand  R25.1 781.0 TSH 3RD GENERATION      2. 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 No associated bradykinesia, rigidity or gait instability. Discussed concern for evolving Parkinson's disease given characteristics of current right hand tremor. Recommend baseline neuroimaging to exclude any contributing structural pathology, however she defers MRI at this time due to claustrophobia. Will revisit at follow up. Discussed treatment options for her tremor including a trial of Sinemet and monitoring for symptomatic improvement. She elects to continue to monitor clinically over the next several months- we will revisit treatment options at follow up.  Lastly, examination reveals diminished distal LE DTRs with severe large>small fiber sensory deficits extending past the knees bilaterally consistent with a length-dependent polyneuropathy. Risk factors include h/o chemotherapy exposure. Will proceed with additional laboratory investigations to assist with identification of reversible metabolic derangements which may be contributing. Electrodiagnostic testing will be completed for diagnostic confirmation and to establish a baseline. Thankfully, she is not exhibiting any significant dysesthesias at this time. PLAN:  TSH, HbA1C, B12, ROBYN  EMG PN protocol    Follow-up and Dispositions    Return in about 3 months (around 12/27/2022). I have discussed the diagnosis with the patient today and the intended plan as seen in the above orders with both the patient as well as referring provider and/or PCP via electronic correspondence. The patient has received an after-visit summary and questions were answered concerning future plans. I have discussed medication side effects and warnings with the patient as well. The duration of this appointment visit was 60 minutes spent on interview, examination, review of records/labs, counseling, explanation of diagnosis, planning of further management, documentation and coordination of care. Charo Knowles DO  Staff Neurologist  Diplomate, [facility] Board of Psychiatry & Neurology       CC Referring provider:    Timothy Quiñones MD

## 2022-09-27 NOTE — LETTER
9/27/2022    Patient: Thania Saravia   YOB: 1935   Date of Visit: 9/27/2022     Niru Marques, [address]  [address] [place] [address]  [address] [facility] [address]    Dear Niru Marques MD,      Thank you for referring Ms. Jennifer Ferris to [address] [place] [address] for evaluation. My notes for this consultation are attached. If you have questions, please do not hesitate to call me. I look forward to following your patient along with you.       Sincerely,    Vicenta Peña, DO

## 2022-09-30 LAB
ALBUMIN SERPL ELPH-MCNC: 4.1 G/DL (ref 2.9–4.4)
ALBUMIN/GLOB SERPL: 1.3 {RATIO} (ref 0.7–1.7)
ALPHA1 GLOB SERPL ELPH-MCNC: 0.2 G/DL (ref 0–0.4)
ALPHA2 GLOB SERPL ELPH-MCNC: 0.8 G/DL (ref 0.4–1)
B-GLOBULIN SERPL ELPH-MCNC: 1.2 G/DL (ref 0.7–1.3)
EST. AVERAGE GLUCOSE BLD GHB EST-MCNC: 134 MG/DL
GAMMA GLOB SERPL ELPH-MCNC: 1 G/DL (ref 0.4–1.8)
GLOBULIN SER-MCNC: 3.2 G/DL (ref 2.2–3.9)
HBA1C MFR BLD: 6.3 % (ref 4.8–5.6)
IGA SERPL-MCNC: 347 MG/DL (ref 64–422)
IGG SERPL-MCNC: 1181 MG/DL (ref 586–1602)
IGM SERPL-MCNC: 89 MG/DL (ref 26–217)
INTERPRETATION SERPL IEP-IMP: ABNORMAL
KAPPA LC FREE SER-MCNC: 24.1 MG/L (ref 3.3–19.4)
KAPPA LC FREE/LAMBDA FREE SER: 1.03 {RATIO} (ref 0.26–1.65)
LAMBDA LC FREE SERPL-MCNC: 23.3 MG/L (ref 5.7–26.3)
M PROTEIN SERPL ELPH-MCNC: ABNORMAL G/DL
PLEASE NOTE:, 149534: ABNORMAL
PROT SERPL-MCNC: 7.3 G/DL (ref 6–8.5)
TSH SERPL DL<=0.005 MIU/L-ACNC: 2.62 UIU/ML (ref 0.45–4.5)
VIT B12 SERPL-MCNC: 436 PG/ML (ref 232–1245)

## 2022-10-03 ENCOUNTER — TELEPHONE (OUTPATIENT)
Dept: NEUROLOGY | Age: 87
End: 2022-10-03

## 2022-10-03 NOTE — TELEPHONE ENCOUNTER
Called the Pt. And spoke to her per Dr. Jo Ann Gutierrez:  Shereen Davilajosé reveal evidence of prediabetes/borderline DM.  Advise follow up with his PCP to discuss this further

## 2022-10-21 ENCOUNTER — OFFICE VISIT (OUTPATIENT)
Dept: NEUROLOGY | Age: 87
End: 2022-10-21
Payer: MEDICARE

## 2022-10-21 DIAGNOSIS — G62.9 POLYNEUROPATHY: Primary | ICD-10-CM

## 2022-10-21 PROCEDURE — 95886 MUSC TEST DONE W/N TEST COMP: CPT | Performed by: PSYCHIATRY & NEUROLOGY

## 2022-10-21 PROCEDURE — 95910 NRV CNDJ TEST 7-8 STUDIES: CPT | Performed by: PSYCHIATRY & NEUROLOGY

## 2022-10-21 NOTE — PROGRESS NOTES
[facility]  [address], [facility] [address]  Phone [phone] Fax [phone]  Test Date:  10/21/2022    Patient: Abhishek Alvarado : 1935 Physician: Ravi Hills DO   Sex: Female Height: 5' 6\" Ref Phys: Ravi Hills DO   ID#: [id number]  Weight: 178 lbs. Technician: Duke Bella     Patient Complaints:  Skin sensation disturbance; Eval polyneuropathy    NCV & EMG Findings:  Evaluation of the left peroneal motor and the right peroneal motor nerves showed reduced amplitude (L0.7, R0.7 mV). The left Sup Peroneal sensory and the right Sup Peroneal sensory nerves showed no response (14 cm). The left sural sensory and the right sural sensory nerves showed no response (Calf). All remaining nerves  were within normal limits. All left vs. right side differences were within normal limits. All F Wave latencies were within normal limits. Needle evaluation of the right extensor digitorum brevis muscle showed increased motor unit amplitude, increased motor unit duration, rapid recruitment, and very decreased interference pattern. The right posterior tibialis muscle showed increased motor unit amplitude and increased motor unit duration. The right anterior tibialis muscle showed increased motor unit amplitude, increased motor unit duration, and rapid recruitment. All remaining muscles (as indicated in the following table) showed no evidence of electrical instability. Impression:  Extensive electrodiagnostic examination of the right lower extremity and additional nerve conduction studies of the left lower extremity reveals the followin. 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       2. Chronic motor axon loss changes are identified in right L5-innervated myotomes suggestive of a possible remote right L5 motor radiculopathy, moderate in degree electrically. ___________________________  Rafaela Loss  Brittany Perera, DO        Nerve Conduction Studies  Anti Sensory Summary Table     Stim Site NR Peak (ms) Norm Peak (ms) P-T Amp (µV) Norm P-T Amp Onset (ms) Site1 Site2 Delta-P (ms) Dist (cm) Félix (m/s) Norm Félix (m/s)   Left Sup Peroneal Anti Sensory (Ant Lat Mall)  31.4°C   14 cm NR  <4.4  >5.0  14 cm Ant Lat Mall  14.0  >32   Right Sup Peroneal Anti Sensory (Ant Lat Mall)  30.3°C   14 cm NR  <4.4  >5.0  14 cm Ant Lat Mall  14.0  >32   Left Sural Anti Sensory (Lat Mall)  31.7°C   Calf NR  <4.0  >5.0  Calf Lat Mall  14.0  >35   Right Sural Anti Sensory (Lat Mall)  30.2°C   Calf NR  <4.0  >5.0  Calf Lat Mall  14.0  >35     Motor Summary Table     Stim Site NR Onset (ms) Norm Onset (ms) O-P Amp (mV) Norm O-P Amp Site1 Site2 Delta-0 (ms) Dist (cm) Félix (m/s) Norm Félix (m/s)   Left Peroneal Motor (Ext Dig Brev)  31.7°C   Ankle    4.1 <6.1 0.7 >2.5 B Fib Ankle 9.5 36.0 38 >38   B Fib    13.6  0.6  Poplt B Fib 2.5 10.0 40 >40   Poplt    16.1  0.5          Right Peroneal Motor (Ext Dig Brev)  30.7°C   Ankle    5.4 <6.1 0.7 >2.5 B Fib Ankle 9.5 36.0 38 >38   B Fib    14.9  0.7  Poplt B Fib 1.9 10.0 53 >40   Poplt    16.8  0.7          Left Tibial Motor (Abd Auguste Brev)  31°C   Ankle    4.8 <6.1 8.4 >3.0 Knee Ankle 9.3 39.0 42 >35   Knee    14.1  5.4          Right Tibial Motor (Abd Auguste Brev)  30.9°C   Ankle    4.6 <6.1 8.4 >3.0 Knee Ankle 9.2 39.0 42 >35   Knee    13.8  7.4            F Wave Studies     NR F-Lat (ms) Lat Norm (ms) L-R F-Lat (ms) L-R Lat Norm   Right Tibial (Mrkrs) (Abd Hallucis)  31.3°C      56.96 <61  <5.7     EMG     Side Muscle Nerve Root Ins Act Fibs Psw Amp Dur Poly Recrt Int Geoffery Pickler Comment   Right Ext Dig Brev Dp Br Peronel L5, S1 Nml Nml Nml Incr >12ms 0 Rapid 25%    Right AbdHallucis MedPlantar S1-2 Nml Nml Nml Nml Nml 0 Nml Nml    Right PostTibialis Tibial L5, S1 Nml Nml Nml Incr >12ms 0 Nml Nml    Right Gastroc Tibial S1-2 Nml Nml Nml Nml Nml 0 Nml Nml    Right AntTibialis Dp Br Peronel L4-5 Nml Nml Nml Incr >12ms 0 Rapid Nml    Right RectFemoris Femoral L2-4 Nml Nml Nml Nml Nml 0 Nml Nml          Waveforms:

## 2022-11-07 ENCOUNTER — HOSPITAL ENCOUNTER (OUTPATIENT)
Dept: GENERAL RADIOLOGY | Age: 87
Discharge: HOME | End: 2022-11-07
Attending: INTERNAL MEDICINE
Payer: MEDICARE

## 2022-11-07 ENCOUNTER — OFFICE VISIT (OUTPATIENT)
Dept: INTERNAL MEDICINE CLINIC | Age: 87
End: 2022-11-07
Attending: INTERNAL MEDICINE
Payer: MEDICARE

## 2022-11-07 VITALS
SYSTOLIC BLOOD PRESSURE: 144 MMHG | HEIGHT: 66 IN | TEMPERATURE: 98.2 F | WEIGHT: 169 LBS | HEART RATE: 60 BPM | DIASTOLIC BLOOD PRESSURE: 83 MMHG | BODY MASS INDEX: 27.16 KG/M2 | RESPIRATION RATE: 18 BRPM | OXYGEN SATURATION: 98 %

## 2022-11-07 DIAGNOSIS — R73.01 IMPAIRED FASTING GLUCOSE: Primary | ICD-10-CM

## 2022-11-07 DIAGNOSIS — M25.551 RIGHT HIP PAIN: ICD-10-CM

## 2022-11-07 DIAGNOSIS — G62.9 POLYNEUROPATHY: ICD-10-CM

## 2022-11-07 PROCEDURE — 99213 OFFICE O/P EST LOW 20 MIN: CPT | Performed by: INTERNAL MEDICINE

## 2022-11-07 PROCEDURE — G8510 SCR DEP NEG, NO PLAN REQD: HCPCS | Performed by: INTERNAL MEDICINE

## 2022-11-07 PROCEDURE — G8754 DIAS BP LESS 90: HCPCS | Performed by: INTERNAL MEDICINE

## 2022-11-07 PROCEDURE — G8417 CALC BMI ABV UP PARAM F/U: HCPCS | Performed by: INTERNAL MEDICINE

## 2022-11-07 PROCEDURE — 1090F PRES/ABSN URINE INCON ASSESS: CPT | Performed by: INTERNAL MEDICINE

## 2022-11-07 PROCEDURE — G8427 DOCREV CUR MEDS BY ELIG CLIN: HCPCS | Performed by: INTERNAL MEDICINE

## 2022-11-07 PROCEDURE — G8753 SYS BP > OR = 140: HCPCS | Performed by: INTERNAL MEDICINE

## 2022-11-07 PROCEDURE — 1101F PT FALLS ASSESS-DOCD LE1/YR: CPT | Performed by: INTERNAL MEDICINE

## 2022-11-07 PROCEDURE — 73502 X-RAY EXAM HIP UNI 2-3 VIEWS: CPT

## 2022-11-07 PROCEDURE — G8536 NO DOC ELDER MAL SCRN: HCPCS | Performed by: INTERNAL MEDICINE

## 2022-11-07 NOTE — PROGRESS NOTES
Celio Trevino is a 82 y.o. female who was seen in clinic today (2022). Assessment & Plan:   Below is the assessment and plan developed based on review of pertinent history, physical exam, labs, studies, and medications. 1. Impaired fasting glucose  Comments:  unclear if dx is accurate, A1c abnormal but iin the last 10+ yrs no abnormal FBS on file. Will check fructosamine to clarify. Continue to work on diet  Orders:  -     FRUCTOSAMINE; Future  2. Right hip pain  Comments:  chronic issue, differential reviewed, could be radicular or OA, will get imaging to complete work up. Reviewed life style changes and expectations  Orders:  -     XR HIP RT W OR WO PELV 2-3 VWS; Future  -     REFERRAL TO PHYSICAL THERAPY  3. Polyneuropathy  Assessment & Plan:  Chronic issue, EMG in  reviewed, differential reviewed, discussed Lyrica/Gabpentin but will hold off as no pain. Reviewed expectations       Subjective/Objective:   Danica was seen today for Pain (Chronic)      Since last visit: saw neurology on  (not reviewed) and had EMG done on 10/21. She is concerned about being a pre-diabetic. She has changed her diet, she is following a \"diet plan\". She is having R hip pain but nothing below that. She is very concerned about the possibility of PD. She has f/u with neurology in January. Impression:  Extensive electrodiagnostic examination of the right lower extremity and additional nerve conduction studies of the left lower extremity reveals the followin. 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 2. Chronic motor axon loss changes are identified in right L5-innervated myotomes suggestive of a possible remote right L5 motor radiculopathy, moderate in degree electrically. Prior to Admission medications    Medication Sig Start Date End Date Taking? Authorizing Provider   Biotin 2,500 mcg cap Take  by mouth. Yes Provider, Historical   amLODIPine (NORVASC) 5 mg tablet TAKE 1 TABLET DAILY 7/31/22  Yes Carolyn Ovalles MD   fexofenadine (ALLEGRA) 180 mg tablet Take 180 mg by mouth daily. Yes Provider, Historical   LUMIGAN 0.01 % ophthalmic drops  2/8/18  Yes Provider, Historical   ESTRACE 0.01 % (0.1 mg/gram) vaginal cream  6/27/17  Yes Provider, Historical   cholecalciferol (VITAMIN D3) (1000 Units /25 mcg) tablet Take  by mouth daily. Yes Provider, Historical   CALCIUM CARBONATE/VITAMIN D3 (CALCIUM + D PO) Take  by mouth daily. Yes Provider, Historical   CARBOXYMETHYLCELLULOSE SODIUM (THERA TEARS OP) Apply  to eye four (4) times daily. Yes Provider, Historical        Review of Systems   Respiratory:  Negative for cough and shortness of breath. Cardiovascular:  Negative for chest pain and palpitations. Gastrointestinal:  Negative for abdominal pain, constipation, diarrhea, nausea and vomiting. Physical Exam  Musculoskeletal:      Lumbar back: No tenderness or bony tenderness. Right hip: No deformity, tenderness or bony tenderness. Normal range of motion.      Visit Vitals  BP (!) 144/83   Pulse 60   Temp 98.2 °F (36.8 °C) (Temporal)   Resp 18   Ht 5' 6\" (1.676 m)   Wt 169 lb (76.7 kg)   SpO2 98%   BMI 27.28 kg/m²       Miquel Luz MD

## 2022-11-07 NOTE — PROGRESS NOTES
Results reviewed. Please call patient and notify her that her xray is abnormal and there is moderate OA in the right hip. I think most of her pain is likely hip related but it also could be nerve pain. Stretching, exercise, and PT would be my recommendations.

## 2022-11-07 NOTE — ASSESSMENT & PLAN NOTE
Diagnosis was 10+ yrs, will change to history of, she is monitored by specialist. No acute findings meriting change in the plan

## 2022-11-07 NOTE — ASSESSMENT & PLAN NOTE
Chronic issue, EMG in '22 reviewed, differential reviewed, discussed Lyrica/Gabpentin but will hold off as no pain.  Reviewed expectations

## 2022-11-09 LAB — FRUCTOSAMINE SERPL-SCNC: 246 UMOL/L (ref 0–285)

## 2022-11-10 ENCOUNTER — TELEPHONE (OUTPATIENT)
Dept: INTERNAL MEDICINE CLINIC | Age: 87
End: 2022-11-10

## 2022-11-10 NOTE — TELEPHONE ENCOUNTER
Reason for call:  patient returning a call to [facility] regarding lab results, please call her back.      Is this a new problem: yes     Date of last appointment:  11/7/2022     Can we respond via [employer]: no    Best call back number:   Jenelle Orestes (Self [phone] [facility]

## 2022-11-11 PROBLEM — R73.02 GLUCOSE INTOLERANCE (IMPAIRED GLUCOSE TOLERANCE): Status: ACTIVE | Noted: 2022-11-11

## 2022-11-11 NOTE — PROGRESS NOTES
Letter sent to patient. Fructosamine in pre-DM range, but lower then A1c. Normal FBS. Likely some degree of glucose intolerance.   No changes at this time

## 2022-11-16 ENCOUNTER — TELEPHONE (OUTPATIENT)
Dept: INTERNAL MEDICINE CLINIC | Age: 87
End: 2022-11-16

## 2022-11-16 DIAGNOSIS — M25.551 RIGHT HIP PAIN: Primary | ICD-10-CM

## 2022-11-16 NOTE — TELEPHONE ENCOUNTER
----- Message from Sunshine Shane sent at 11/16/2022  8:31 AM EST -----  Subject: Referral Request    Reason for referral request? Patient is requesting a referral for a   physical therapy specialist to be seen . [place] go [place]. Please contact patient when this is approved and   ready for scheduling. Provider patient wants to be referred to(if known):     Provider Phone Number(if known):     Additional Information for Provider?   ---------------------------------------------------------------------------  --------------  [facility]    [phone]; OK to leave message on voicemail  ---------------------------------------------------------------------------  --------------

## 2022-11-17 ENCOUNTER — TELEPHONE (OUTPATIENT)
Dept: INTERNAL MEDICINE CLINIC | Age: 87
End: 2022-11-17

## 2022-11-17 NOTE — TELEPHONE ENCOUNTER
Reason for call:   Pt is returning Emily's call please call pt back tomorrow mahsa 11/18/22    Is this a new problem: yes     Date of last appointment:  11/7/2022     Can we respond via [employer]: no    Best call back number:   Consuelo Pena (Self) [phone] (Mobile)

## 2022-11-17 NOTE — TELEPHONE ENCOUNTER
Notified patient can use physical therapy of choosing. Patient would rather have PT facility close to home. Provided [facility] and [facility] as recommended options. Patient will cancel upcoming PT appt at the [facility].

## 2022-11-21 ENCOUNTER — APPOINTMENT (OUTPATIENT)
Dept: PHYSICAL THERAPY | Age: 87
End: 2022-11-21

## 2022-11-30 ENCOUNTER — HOSPITAL ENCOUNTER (OUTPATIENT)
Dept: PHYSICAL THERAPY | Age: 87
Discharge: HOME | End: 2022-11-30
Payer: MEDICARE

## 2022-11-30 PROCEDURE — 97161 PT EVAL LOW COMPLEX 20 MIN: CPT | Performed by: PHYSICAL THERAPIST

## 2022-11-30 PROCEDURE — 97110 THERAPEUTIC EXERCISES: CPT | Performed by: PHYSICAL THERAPIST

## 2022-11-30 NOTE — PROGRESS NOTES
[facility]  [address], [place], [address]  Phone: [phone]  Fax: [phone]    Evaluation     Name:  Avelina Antonio                        Age: 1935                  Gender: female  Referrer:  Kati Fan MD                  Dx:  Right hip pain [M25.551]          Date: 11/30/2022    In time:1030 A  Out time: 11:15 A  Total Treatment Time (min): 45  Total Timed Codes (min): 15  1:1 Treatment Time (MC only): 39   Visit #: 1    Subjective:  Current symptoms/chief complaint:   Pt presents with R hip \"stiffness, achy. \" States she would not call it \"pain\". Marcy after sitting for 20+ min she has inc'd symptoms in R hip, feels like she has to [employer]" everything (hips/lower back) when she stands up. \"I feel lopsided\". She goes to the gym 3 days/wk-- she used to walk on the track but now walks on the treadmill. She states that she feels like her R hip will occasionally give out on her. Hx of herniated disc in lower back. Aggravated by: prolonged sitting/standing  Eased by:  stretching? Pain:   --/10 max --/10 min 0/10 now       Location of symptoms: R hip     Diagnostic Tests: [] Lab work [x] X-rays    [] CT [] MRI     [] Other:  Results (per report of the patient): R hip OA    PMHX: Significant for high BP, visual impairment; hearing impaired; OA; hysterectomy/ovarian cancer; pre-diabetic; seeing neurologist for possible Parkinsons  Social/Recreation/Work: Does not work.  Gym 3 days/wk  Prior level of function: able to walk around track 6+months ago  Patient goal(s): \"walking better\"    Objective:    Posture: Fair sitting, standing posture  Gait:WNL    Lumbar AROM  Flex= fingers to floor  Ext= WNL  R and L SBing= fingers mid patella         Strength (1-5)           Right Left   Hip flexion 3- 4   Knee flex 5 5   Knee ext 5 5    Bridge WNL  Supine SLR R and L WNL     Functional Biomechanical Screen  Tandem stance ~3-4 sec ea dir; max sway Palpation  No significant TTP    Optional Tests  Teddy Test:  [x] Neg    [] Pos  LAD R LE    neg     Outcome Measure: Patient presents with a FOTO score of complete see chart.       15 min Therapeutic Exercise:  [x] See flow sheet : instructed in HEP   Rationale: increase ROM and increase strength to improve the patients ability to perform ADL's, daily chores with dec'd symptoms    With   [x] TE   [] TA   [] neuro   [] other: Patient Education: [x] Review HEP    [] Progressed/Changed HEP based on:   [x] positioning   [] body mechanics   [] transfers   [x] heat/ice application    [x] other: martinez/phys; PT POC; importance of performing HEP in order to maximize benefit of PT; use of heat for 10-15 min as needed for pain management; encouraged her to continue with gym routine     Assessment: See POC    Plan: See Brian Sylvester PT, DPT    11/30/2022

## 2022-11-30 NOTE — PROGRESS NOTES
Physical Therapy at [facility],   a part of [address] [place] [address], [address] [place] [address]  Phone: [phone]  Fax: [phone]    Plan of Care/Statement of Necessity for Physical Therapy Services  2-15    Patient name: Thomas Jewell  : 1935  Provider#: [id number]  Referral source: Primo Reyes MD      Medical/Treatment Diagnosis: Right hip pain [M25.551]     Prior Hospitalization: see medical history     Comorbidities: see evaluation  Prior Level of Function: see evaluation  Medications: Verified on Patient Summary List  Start of Care: 24      Onset Date: 6+ mo ago   The Plan of Care and following information is based on the information from the initial evaluation. Assessment/ key information: Pt is an 82year old female presenting with signs and symptoms consistent with R hip OA.  She will benefit from PT to address problem list below    Evaluation Complexity History MEDIUM  Complexity : 1-2 comorbidities / personal factors will impact the outcome/ POC ; Examination LOW Complexity : 1-2 Standardized tests and measures addressing body structure, function, activity limitation and / or participation in recreation  ;Presentation LOW Complexity : Stable, uncomplicated  ;Clinical Decision Making MEDIUM Complexity : FOTO score of 26-74  Overall Complexity Rating: LOW     Problem List: pain affecting function, decrease strength, impaired gait/ balance, decrease ADL/ functional abilitiies, decrease activity tolerance, and decrease flexibility/ joint mobility   Treatment Plan may include any combination of the following: Therapeutic exercise, Neuromuscular reeducation, Manual therapy, Therapeutic activity, Self care/home management, Electric stim unattended , Vasopneumatic device, Gait training, and Ultrasound  Patient / Family readiness to learn indicated by: asking questions, trying to perform skills, and interest  Persons(s) to be included in education: patient (P)  Barriers to Learning/Limitations: None  Patient Goal (s): see evaluation  Patient Self Reported Health Status: good  Rehabilitation Potential: good    Short Term Goals: To be accomplished in 2-3 weeks:  1) Pt will be independent in initial HEP  2) Pt will report >/=25% improvement in symptoms  3) Pt will report compliance with modalities for pain management    Long Term Goals: To be accomplished in 10-12 weeks:  1) Pt will report being able to walk along track at gym  2) Pt will report >/=50% improvement in symptoms  3) Pt will perform tandem stance >/=10 sec ea dir to demonstrate improvement in balance    Frequency / Duration: Patient to be seen 1-2 times per week for 10-12 weeks. Patient/ Caregiver education and instruction: self care, activity modification, and exercises    [x]  Plan of care has been reviewed with PTA    Certification Period: 11/30/22- 2/26/23  Amarjit Villa, PT 11/30/2022    ________________________________________________________________________    I certify that the above Therapy Services are being furnished while the patient is under my care. I agree with the treatment plan and certify that this therapy is necessary.     Physician's Signature:____________________  Date:____________Time: _________         Hilda Bhatia MD

## 2022-12-06 ENCOUNTER — HOSPITAL ENCOUNTER (OUTPATIENT)
Dept: PHYSICAL THERAPY | Age: 87
Discharge: HOME | End: 2022-12-06
Payer: MEDICARE

## 2022-12-06 PROCEDURE — 97140 MANUAL THERAPY 1/> REGIONS: CPT | Performed by: PHYSICAL THERAPIST

## 2022-12-06 PROCEDURE — 97110 THERAPEUTIC EXERCISES: CPT | Performed by: PHYSICAL THERAPIST

## 2022-12-06 NOTE — PROGRESS NOTES
PT DAILY TREATMENT NOTE - [facility] 2-15    Patient Name: Vickie Naidu  Date:2022  : 1935  [x]  Patient  Verified  Payor: [place] MEDICARE / Plan: [place] MEDICARE PART A & B / Product Type: Medicare /    In time: 10:00 A  Out time:  10:45 A  Total Treatment Time (min): 45  Total Timed Codes (min): 45  1:1 Treatment Time ( only): 30   Visit #:  2    Treatment Area: Right hip pain [M25.551]    SUBJECTIVE  Pain Level (0-10 scale): 0  Any medication changes, allergies to medications, adverse drug reactions, diagnosis change, or new procedure performed?: [x] No    [] Yes (see summary sheet for update)  Subjective functional status/changes:   [] No changes reported  Pt reports that exercises are going \"okay\".  She has some stretch/strain through inner R thigh when doing the sidelying clamshells    OBJECTIVE    Modality rationale:    Min Type Additional Details       [] Estim: []Att   []Unatt    []TENS instruct                  []IFC  []Premod   []NMES                     []Other:  []w/US   []w/ice   []w/heat  Position:  Location:       []  Traction: [] Cervical       []Lumbar                       [] Prone          []Supine                       []Intermittent   []Continuous Lbs:  [] before manual  [] after manual  []w/heat    []  Ultrasound: []Continuous   [] Pulsed                       at: []1MHz   []3MHz Location:  W/cm2:    [] Paraffin         Location:   []w/heat   decl []  Ice     []  Heat  []  Ice massage Position:  Location:    []  Laser  []  Other: Position:  Location:      []  Vasopneumatic Device Pressure:       [] lo [] med [] hi   Temperature:      [x] Skin assessment post-treatment:  [x]intact []redness- no adverse reaction    []redness - adverse reaction:     35 min Therapeutic Exercise:  [x] See flow sheet : progressed per flow sheet   Rationale: increase ROM, increase strength, improve coordination, improve balance, and increase proprioception to improve the patients ability to perform ADL's, daily chores with dec'd symptoms    10 min Manual Therapy: grade II-III inf hip mobs with LE in 90/90 position  Use of belt for inf/lat grade II-III R hip mobs    Rationale: decrease pain and increase ROM to improve the patients ability to perform ADL's, daily chores            With   [] TE   [] TA   [] Neuro   [] SC   [] other: Patient Education: [x] Review HEP    [] Progressed/Changed HEP based on:   [] positioning   [] body mechanics   [] transfers   [] heat/ice application    [] other:      Other Objective/Functional Measures: n/a     Pain Level (0-10 scale) post treatment: \"I feel like I can stand up straight! \"    ASSESSMENT/Changes in Function:   Pt tolerated exercise progression and manual techniques very well. Modified sidelying clam to supine clam which improved symptoms. Updated HEP handout today  Patient will continue to benefit from skilled PT services to modify and progress therapeutic interventions, address functional mobility deficits, address ROM deficits, address strength deficits, analyze and address soft tissue restrictions, analyze and cue movement patterns, and assess and modify postural abnormalities to attain remaining goals.      [x]  See Plan of Care  []  See progress note/recertification  []  See Discharge Summary         Progress towards goals / Updated goals:  NT    PLAN  [x]  Upgrade activities as tolerated     [x]  Continue plan of care  []  Update interventions per flow sheet       []  Discharge due to:_  []  Other:_      Amarjit Villa, PT 12/6/2022

## 2022-12-09 ENCOUNTER — HOSPITAL ENCOUNTER (OUTPATIENT)
Dept: PHYSICAL THERAPY | Age: 87
Discharge: HOME | End: 2022-12-09
Payer: MEDICARE

## 2022-12-09 PROCEDURE — 97140 MANUAL THERAPY 1/> REGIONS: CPT | Performed by: PHYSICAL THERAPIST

## 2022-12-09 PROCEDURE — 97110 THERAPEUTIC EXERCISES: CPT | Performed by: PHYSICAL THERAPIST

## 2022-12-09 NOTE — PROGRESS NOTES
PT DAILY TREATMENT NOTE - [facility] 2-15    Patient Name: Avelina Antonio  Date:2022  : 1935  [x]  Patient  Verified  Payor: [place] MEDICARE / Plan: [place] MEDICARE PART A & B / Product Type: Medicare /    In time: 10:00 A  Out time:  10:45 A  Total Treatment Time (min): 45  Total Timed Codes (min): 45  1:1 Treatment Time ( only): 39   Visit #:  3    Treatment Area: Right hip pain [M25.551]    SUBJECTIVE  Pain Level (0-10 scale): 0  Any medication changes, allergies to medications, adverse drug reactions, diagnosis change, or new procedure performed?: [x] No    [] Yes (see summary sheet for update)  Subjective functional status/changes:   [] No changes reported  Pt reports that she felt great after last visit-- she was able to run errands for the rest of day without her hip bothering her  She did go to see the lights at [employer] last night and states her hip was bothering her, however attributes this to the rainy weather    OBJECTIVE    Modality rationale:    Min Type Additional Details       [] Estim: []Att   []Unatt    []TENS instruct                  []IFC  []Premod   []NMES                     []Other:  []w/US   []w/ice   []w/heat  Position:  Location:       []  Traction: [] Cervical       []Lumbar                       [] Prone          []Supine                       []Intermittent   []Continuous Lbs:  [] before manual  [] after manual  []w/heat    []  Ultrasound: []Continuous   [] Pulsed                       at: []1MHz   []3MHz Location:  W/cm2:    [] Paraffin         Location:   []w/heat   decl []  Ice     []  Heat  []  Ice massage Position:  Location:    []  Laser  []  Other: Position:  Location:      []  Vasopneumatic Device Pressure:       [] lo [] med [] hi   Temperature:      [x] Skin assessment post-treatment:  [x]intact []redness- no adverse reaction    []redness - adverse reaction:     35 min Therapeutic Exercise:  [x] See flow sheet : progressed per flow sheet   Rationale: increase ROM, increase strength, improve coordination, improve balance, and increase proprioception to improve the patients ability to perform ADL's, daily chores with dec'd symptoms    10 min Manual Therapy: grade II-III inf hip mobs with LE in 90/90 position  Use of belt for inf/lat grade II-III R hip mobs    Rationale: decrease pain and increase ROM to improve the patients ability to perform ADL's, daily chores            With   [] TE   [] TA   [] Neuro   [] SC   [] other: Patient Education: [x] Review HEP    [] Progressed/Changed HEP based on:   [] positioning   [] body mechanics   [] transfers   [] heat/ice application    [] other:      Other Objective/Functional Measures: n/a     Pain Level (0-10 scale) post treatment: \"good! \"    ASSESSMENT/Changes in Function:   Relief with manual techniques. Tolerated exercise progression well-- challenged with SLS. Patient will continue to benefit from skilled PT services to modify and progress therapeutic interventions, address functional mobility deficits, address ROM deficits, address strength deficits, analyze and address soft tissue restrictions, analyze and cue movement patterns, and assess and modify postural abnormalities to attain remaining goals.      [x]  See Plan of Care  []  See progress note/recertification  []  See Discharge Summary         Progress towards goals / Updated goals:  NT    PLAN  [x]  Upgrade activities as tolerated     [x]  Continue plan of care  []  Update interventions per flow sheet       []  Discharge due to:_  []  Other:_      Cammie Willard, PT 12/9/2022

## 2022-12-13 ENCOUNTER — HOSPITAL ENCOUNTER (OUTPATIENT)
Dept: PHYSICAL THERAPY | Age: 87
Discharge: HOME | End: 2022-12-13
Payer: MEDICARE

## 2022-12-13 PROCEDURE — 97110 THERAPEUTIC EXERCISES: CPT | Performed by: PHYSICAL THERAPY ASSISTANT

## 2022-12-13 PROCEDURE — 97140 MANUAL THERAPY 1/> REGIONS: CPT | Performed by: PHYSICAL THERAPY ASSISTANT

## 2022-12-13 NOTE — PROGRESS NOTES
PT DAILY TREATMENT NOTE - [facility] 2-15    Patient Name: Morris Wagner  Date:2022  : 1935  [x]  Patient  Verified  Payor: [place] MEDICARE / Plan: [place] MEDICARE PART A & B / Product Type: Medicare /    In time: 10:30 A  Out time:  11:20 A  Total Treatment Time (min): 50  Total Timed Codes (min): 50  1:1 Treatment Time ( [address] only): 40   Visit #:  4    Treatment Area: Right hip pain [M25.551]    SUBJECTIVE  Pain Level (0-10 scale): 0  Any medication changes, allergies to medications, adverse drug reactions, diagnosis change, or new procedure performed?: [x] No    [] Yes (see summary sheet for update)  Subjective functional status/changes:   [] No changes reported  She continues to feel great after PT \"I wish I could do the hip stretches at home because you make me feel so much better! \"    OBJECTIVE    Modality rationale:    Min Type Additional Details       [] Estim: []Att   []Unatt    []TENS instruct                  []IFC  []Premod   []NMES                     []Other:  []w/US   []w/ice   []w/heat  Position:  Location:       []  Traction: [] Cervical       []Lumbar                       [] Prone          []Supine                       []Intermittent   []Continuous Lbs:  [] before manual  [] after manual  []w/heat    []  Ultrasound: []Continuous   [] Pulsed                       at: []1MHz   []3MHz Location:  W/cm2:    [] Paraffin         Location:   []w/heat   decl []  Ice     []  Heat  []  Ice massage Position:  Location:    []  Laser  []  Other: Position:  Location:      []  Vasopneumatic Device Pressure:       [] lo [] med [] hi   Temperature:      [x] Skin assessment post-treatment:  [x]intact []redness- no adverse reaction    []redness - adverse reaction:     35 min Therapeutic Exercise:  [x] See flow sheet : progressed per flow sheet   Rationale: increase ROM, increase strength, improve coordination, improve balance, and increase proprioception to improve the patients ability to perform ADL's, daily chores with dec'd symptoms    15 min Manual Therapy: grade II-III inf hip mobs with LE in 90/90 position  Use of belt for inf/lat grade II-III R hip mobs    Rationale: decrease pain and increase ROM to improve the patients ability to perform ADL's, daily chores            With   [] TE   [] TA   [] Neuro   [] SC   [] other: Patient Education: [x] Review HEP    [] Progressed/Changed HEP based on:   [] positioning   [] body mechanics   [] transfers   [] heat/ice application    [] other:      Other Objective/Functional Measures: n/a     Pain Level (0-10 scale) post treatment: 0/10    ASSESSMENT/Changes in Function:   Decreased pain overall. Able to perform step ups without R hip pain. Patient will continue to benefit from skilled PT services to modify and progress therapeutic interventions, address functional mobility deficits, address ROM deficits, address strength deficits, analyze and address soft tissue restrictions, analyze and cue movement patterns, and assess and modify postural abnormalities to attain remaining goals.      [x]  See Plan of Care  []  See progress note/recertification  []  See Discharge Summary         Progress towards goals / Updated goals:  NT    PLAN  [x]  Upgrade activities as tolerated     [x]  Continue plan of care  []  Update interventions per flow sheet       []  Discharge due to:_  []  Other:_      Mariely Lang, PTA 12/13/2022

## 2022-12-15 ENCOUNTER — APPOINTMENT (OUTPATIENT)
Dept: PHYSICAL THERAPY | Age: 87
End: 2022-12-15
Payer: MEDICARE

## 2022-12-20 ENCOUNTER — APPOINTMENT (OUTPATIENT)
Dept: PHYSICAL THERAPY | Age: 87
End: 2022-12-20
Payer: MEDICARE

## 2022-12-22 ENCOUNTER — HOSPITAL ENCOUNTER (OUTPATIENT)
Dept: PHYSICAL THERAPY | Age: 87
Discharge: HOME | End: 2022-12-22
Payer: MEDICARE

## 2022-12-22 PROCEDURE — 97140 MANUAL THERAPY 1/> REGIONS: CPT | Performed by: PHYSICAL THERAPY ASSISTANT

## 2022-12-22 PROCEDURE — 97110 THERAPEUTIC EXERCISES: CPT | Performed by: PHYSICAL THERAPY ASSISTANT

## 2022-12-22 NOTE — PROGRESS NOTES
PT DAILY TREATMENT NOTE - [facility] 2-15    Patient Name: Vickie Naidu  Date:2022  : 1935  [x]  Patient  Verified  Payor: Gardenia Sargent / Plan: [place] MEDICARE PART A & B / Product Type: Medicare /    In time: 10:55 A  Out time:  11:50 A  Total Treatment Time (min): 55  Total Timed Codes (min): 55  1:1 Treatment Time ( [address] only): 40   Visit #:  5    Treatment Area: Right hip pain [M25.551]    SUBJECTIVE  Pain Level (0-10 scale): 0  Any medication changes, allergies to medications, adverse drug reactions, diagnosis change, or new procedure performed?: [x] No    [] Yes (see summary sheet for update)  Subjective functional status/changes:   [] No changes reported  She has an appointment with Neurology on 23. States she feels like she is improving but she still has some weakness in her hip that she would like to work on. States continued difficulty with stairs or stepping up/down on a curb.     OBJECTIVE    Modality rationale:    Min Type Additional Details       [] Estim: []Att   []Unatt    []TENS instruct                  []IFC  []Premod   []NMES                     []Other:  []w/US   []w/ice   []w/heat  Position:  Location:       []  Traction: [] Cervical       []Lumbar                       [] Prone          []Supine                       []Intermittent   []Continuous Lbs:  [] before manual  [] after manual  []w/heat    []  Ultrasound: []Continuous   [] Pulsed                       at: []1MHz   []3MHz Location:  W/cm2:    [] Paraffin         Location:   []w/heat   decl []  Ice     []  Heat  []  Ice massage Position:  Location:    []  Laser  []  Other: Position:  Location:      []  Vasopneumatic Device Pressure:       [] lo [] med [] hi   Temperature:      [x] Skin assessment post-treatment:  [x]intact []redness- no adverse reaction    []redness - adverse reaction:     40 min Therapeutic Exercise:  [x] See flow sheet : progressed per flow sheet   Rationale: increase ROM, increase strength, improve coordination, improve balance, and increase proprioception to improve the patients ability to perform ADL's, daily chores with dec'd symptoms    15 min Manual Therapy: grade II-III inf hip mobs with LE in 90/90 position  Use of belt for inf/lat grade II-III R hip mobs    Rationale: decrease pain and increase ROM to improve the patients ability to perform ADL's, daily chores            With   [] TE   [] TA   [] Neuro   [] SC   [] other: Patient Education: [x] Review HEP    [] Progressed/Changed HEP based on:   [] positioning   [] body mechanics   [] transfers   [] heat/ice application    [] other:      Other Objective/Functional Measures: n/a     Pain Level (0-10 scale) post treatment: 0/10    ASSESSMENT/Changes in Function:   Advised patient to continue with PT to progress balance and LE strengthening. Patient with decreased discomfort following manual therapy. Patient will continue to benefit from skilled PT services to modify and progress therapeutic interventions, address functional mobility deficits, address ROM deficits, address strength deficits, analyze and address soft tissue restrictions, analyze and cue movement patterns, and assess and modify postural abnormalities to attain remaining goals.      [x]  See Plan of Care  []  See progress note/recertification  []  See Discharge Summary         Progress towards goals / Updated goals:  NT    PLAN  [x]  Upgrade activities as tolerated     [x]  Continue plan of care  []  Update interventions per flow sheet       []  Discharge due to:_  []  Other:_      Xochilt Martinez, SURY 12/22/2022

## 2023-01-03 ENCOUNTER — HOSPITAL ENCOUNTER (OUTPATIENT)
Dept: PHYSICAL THERAPY | Age: 88
Discharge: HOME | End: 2023-01-03
Payer: MEDICARE

## 2023-01-03 PROCEDURE — 97110 THERAPEUTIC EXERCISES: CPT | Performed by: PHYSICAL THERAPIST

## 2023-01-03 PROCEDURE — 97140 MANUAL THERAPY 1/> REGIONS: CPT | Performed by: PHYSICAL THERAPIST

## 2023-01-03 NOTE — PROGRESS NOTES
PT DAILY TREATMENT NOTE - [facility] 2-15    Patient Name: Pillo Porter  Date:1/3/2023  : 1935  [x]  Patient  Verified  Payor: Hardik Racer / Plan: [place] MEDICARE PART A & B / Product Type: Medicare /    In time: 930 A  Out time: 10:05 A  Total Treatment Time (min): 35  Total Timed Codes (min): 35  1:1 Treatment Time ( only): 30   Visit #:  6    Treatment Area: Right hip pain [M25.551]    SUBJECTIVE  Pain Level (0-10 scale): 0  Any medication changes, allergies to medications, adverse drug reactions, diagnosis change, or new procedure performed?: [x] No    [] Yes (see summary sheet for update)  Subjective functional status/changes:   [] No changes reported  Pt reports continued difficulty with stairs- she did go up/down stairs a lot this past weekend putting away [place] decorations. Overall doing well.    She has appointment with neurologist on Thursday,     OBJECTIVE    Modality rationale:    Min Type Additional Details       [] Estim: []Att   []Unatt    []TENS instruct                  []IFC  []Premod   []NMES                     []Other:  []w/US   []w/ice   []w/heat  Position:  Location:       []  Traction: [] Cervical       []Lumbar                       [] Prone          []Supine                       []Intermittent   []Continuous Lbs:  [] before manual  [] after manual  []w/heat    []  Ultrasound: []Continuous   [] Pulsed                       at: []1MHz   []3MHz Location:  W/cm2:    [] Paraffin         Location:   []w/heat   decl []  Ice     []  Heat  []  Ice massage Position:  Location:    []  Laser  []  Other: Position:  Location:      []  Vasopneumatic Device Pressure:       [] lo [] med [] hi   Temperature:      [x] Skin assessment post-treatment:  [x]intact []redness- no adverse reaction    []redness - adverse reaction:     25 min Therapeutic Exercise:  [x] See flow sheet : progressed per flow sheet   Rationale: increase ROM, increase strength, improve coordination, improve balance, and increase proprioception to improve the patients ability to perform ADL's, daily chores with dec'd symptoms    10 min Manual Therapy: grade II-III inf hip mobs with LE in 90/90 position  Use of belt for inf/lat grade II-III R hip mobs    Rationale: decrease pain and increase ROM to improve the patients ability to perform ADL's, daily chores            With   [] TE   [] TA   [] Neuro   [] SC   [] other: Patient Education: [x] Review HEP    [] Progressed/Changed HEP based on:   [] positioning   [] body mechanics   [] transfers   [] heat/ice application    [] other:      Other Objective/Functional Measures: n/a     Pain Level (0-10 scale) post treatment: 0/10    ASSESSMENT/Changes in Function:   Overall progressing very well with PT; dec'd symptoms after manual techniques  Patient will continue to benefit from skilled PT services to modify and progress therapeutic interventions, address functional mobility deficits, address ROM deficits, address strength deficits, analyze and address soft tissue restrictions, analyze and cue movement patterns, and assess and modify postural abnormalities to attain remaining goals.      [x]  See Plan of Care  []  See progress note/recertification  []  See Discharge Summary         Progress towards goals / Updated goals:  NT    PLAN  [x]  Upgrade activities as tolerated     [x]  Continue plan of care  []  Update interventions per flow sheet       []  Discharge due to:_  []  Other:_      Tonja Mosquera, PT 1/3/2023

## 2023-01-05 ENCOUNTER — OFFICE VISIT (OUTPATIENT)
Dept: NEUROLOGY | Age: 88
End: 2023-01-05
Payer: MEDICARE

## 2023-01-05 VITALS
SYSTOLIC BLOOD PRESSURE: 136 MMHG | HEART RATE: 81 BPM | HEIGHT: 66 IN | WEIGHT: 157 LBS | BODY MASS INDEX: 25.23 KG/M2 | DIASTOLIC BLOOD PRESSURE: 78 MMHG | OXYGEN SATURATION: 98 %

## 2023-01-05 DIAGNOSIS — R25.1 TREMOR OF RIGHT HAND: ICD-10-CM

## 2023-01-05 DIAGNOSIS — G62.9 POLYNEUROPATHY: ICD-10-CM

## 2023-01-05 DIAGNOSIS — G20 PARKINSONIAN TREMOR (HCC): Primary | ICD-10-CM

## 2023-01-05 DIAGNOSIS — R73.03 PREDIABETES: ICD-10-CM

## 2023-01-05 PROCEDURE — 3075F SYST BP GE 130 - 139MM HG: CPT | Performed by: PSYCHIATRY & NEUROLOGY

## 2023-01-05 PROCEDURE — 1123F ACP DISCUSS/DSCN MKR DOCD: CPT | Performed by: PSYCHIATRY & NEUROLOGY

## 2023-01-05 PROCEDURE — G8536 NO DOC ELDER MAL SCRN: HCPCS | Performed by: PSYCHIATRY & NEUROLOGY

## 2023-01-05 PROCEDURE — G8417 CALC BMI ABV UP PARAM F/U: HCPCS | Performed by: PSYCHIATRY & NEUROLOGY

## 2023-01-05 PROCEDURE — G8427 DOCREV CUR MEDS BY ELIG CLIN: HCPCS | Performed by: PSYCHIATRY & NEUROLOGY

## 2023-01-05 PROCEDURE — 1090F PRES/ABSN URINE INCON ASSESS: CPT | Performed by: PSYCHIATRY & NEUROLOGY

## 2023-01-05 PROCEDURE — 1101F PT FALLS ASSESS-DOCD LE1/YR: CPT | Performed by: PSYCHIATRY & NEUROLOGY

## 2023-01-05 PROCEDURE — G8510 SCR DEP NEG, NO PLAN REQD: HCPCS | Performed by: PSYCHIATRY & NEUROLOGY

## 2023-01-05 PROCEDURE — 99214 OFFICE O/P EST MOD 30 MIN: CPT | Performed by: PSYCHIATRY & NEUROLOGY

## 2023-01-05 PROCEDURE — 3078F DIAST BP <80 MM HG: CPT | Performed by: PSYCHIATRY & NEUROLOGY

## 2023-01-05 NOTE — PROGRESS NOTES
Neurology Clinic Follow up Note    Patient ID:  Willis Willard  [id number]  82 y.o.  1935      Ms. Humble Terry is here for follow up today of  Chief Complaint   Patient presents with    Tremors      Neuropathy in legs is about the same, Tremors in rt hand only           Last Appointment With Me:  9/27/2022       Interval History:   R hand tremors are persistent/unchanged, primarily at rest. These occasionally interfere with her writing. Denies bradykinesia, significant rigidity. She admits to some gait instability due to R hip arthritis. No falls. She reports LE distal numbness is stable, no significant dysesthesias. Labs did reveal evidence of prediabetes. She has lost 20 lbs attempting to work on diet due to above findings. She is exercising 3x weekly. PMHx/ PSHx/ FHx/ SHx:  Reviewed and unchanged previous visit. 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 Objective:       Meds:  Current Outpatient Medications   Medication Sig Dispense Refill    Biotin 2,500 mcg cap Take  by mouth. amLODIPine (NORVASC) 5 mg tablet TAKE 1 TABLET DAILY 90 Tablet 3    LUMIGAN 0.01 % ophthalmic drops       ESTRACE 0.01 % (0.1 mg/gram) vaginal cream       cholecalciferol (VITAMIN D3) (1000 Units /25 mcg) tablet Take  by mouth daily. CALCIUM CARBONATE/VITAMIN D3 (CALCIUM + D PO) Take  by mouth daily. CARBOXYMETHYLCELLULOSE SODIUM (THERA TEARS OP) Apply  to eye four (4) times daily. fexofenadine (ALLEGRA) 180 mg tablet Take 180 mg by mouth daily.          Exam:  Visit Vitals  /78   Pulse 81   Ht 5' 6\" (1.676 m)   Wt 157 lb (71.2 kg)   SpO2 98%   BMI 25.34 kg/m²     NEUROLOGICAL EXAM:  General: Awake, alert, speech fluent  CN: PERRL, EOMI without nystagmus, VFF to confrontation, facial sensation and strength are normal and symmetric, hearing is intact to finger rub bilaterally, palate and tongue movements are intact and symmetric. Motor: Normal tone, bulk and strength bilaterally. Reflexes: 2/4 and symmetric, plantar stimulation is flexor. Sensation: Absent vibration past knees b/l, decreased pinprick/temperature distal LE b/l, otherwise normal and symmetric perception of  light touch, proprioception; (-) Romberg. Coordination: No dysmetria. Normal rapid alternating movements; finger-to-nose and heel-to- shin testing are within normal limits. No bradykinesia, rigidity. +RUE moderate resting tremor with more subtle postural tremor. Gait: Normal-based and steady.     LABS  Results for orders placed or performed in visit on 11/07/22   FRUCTOSAMINE   Result Value Ref Range    Fructosamine 246 0 - 285 umol/L     Component      Latest Ref Rng & Units 9/27/2022 9/27/2022 9/27/2022 9/27/2022           1:55 PM  1:55 PM  1:55 PM  1:55 PM   Immunoglobulin G, Qt.      586 - 1,602 mg/dL    1,181   Immunoglobulin A, Qt.      64 - 422 mg/dL    347   Immunoglobulin M, Qt.      26 - 217 mg/dL    89   Protein, total      6.0 - 8.5 g/dL    7.3   Albumin      2.9 - 4.4 g/dL    4.1   Alpha-1-Globulin      0.0 - 0.4 g/dL    0.2   Alpha-2-Globulin      0.4 - 1.0 g/dL    0.8   Beta Globulin      0.7 - 1.3 g/dL    1.2   Gamma Globulin      0.4 - 1.8 g/dL    1.0   M-Jaleel      Not Observed g/dL    Not Observed   Globulin, total      2.2 - 3.9 g/dL    3.2   A/G ratio      0.7 - 1.7    1.3   Immunofixation Result          Comment   Please note          Comment   Free Kappa Lt Chains, serum      3.3 - 19.4 mg/L    24.1 (H)   Free Lambda Lt Chains, serum      5.7 - 26.3 mg/L    23.3   Kappa/Lambda ratio, serum      0.26 - 1.65    1.03   Hemoglobin A1c, (calculated)      4.8 - 5.6 %   6.3 (H)    Estimated average glucose      mg/dL   134    TSH 0.450 - 4.500 uIU/mL  2.620     Vitamin B12      232 - 1,245 pg/mL 436          IMAGING:  MRI Results (most recent):  No results found for this or any previous visit. EMG:  Impression:  Extensive electrodiagnostic examination of the right lower extremity and additional nerve conduction studies of the left lower extremity reveals the followin. 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 2. Chronic motor axon loss changes are identified in right L5-innervated myotomes suggestive of a possible remote right L5 motor radiculopathy, moderate in degree electrically. Assessment/Plan:     Encounter Diagnoses     ICD-10-CM ICD-9-CM   1. Parkinsonian tremor ([facility] [address]  G20 332.0   2. Tremor of right hand  R25.1 781.0   3. Polyneuropathy  G62.9 356.9   4. Prediabetes  R73.03 790.29   82 y.o. female referred for evaluation of progressive right hand tremors x 1 year more notable at rest during examination. No associated bradykinesia, rigidity or gait instability. Discussed concern for evolving Parkinson's disease given characteristics of current right hand tremor. She does have a family history of reported essential tremor. We discussed how this typically presents as an action/postural tremor rather than resting tremor. Recommend baseline neuroimaging to exclude any contributing structural pathology, however she defers MRI at this time due to claustrophobia. Discussed treatment options for her tremor including a trial of Sinemet and monitoring for symptomatic improvement. She elects to continue to monitor clinically over the next several months as symptoms are not interfering significantly with daily activity.    Lastly, examination reveals diminished distal LE DTRs with severe large>small fiber sensory deficits extending past the knees bilaterally consistent with a length-dependent polyneuropathy, moderate in degree on recent electrodiagnostic testing. Risk factors include h/o chemotherapy exposure as well as prediabetes. Discussed importance of strict glucose control to prevent further progression of her polyneuropathy. Follow-up and Dispositions    Return in about 6 months (around 7/5/2023). I have discussed the diagnosis with the patient today and the intended plan as seen in the above orders with both the patient as well as referring provider and/or PCP via electronic correspondence. The patient has received an after-visit summary and questions were answered concerning future plans. I have discussed medication side effects and warnings with the patient as well.       Signed:  Kathy Hopper DO  1/5/2023  9:32 AM

## 2023-01-06 ENCOUNTER — HOSPITAL ENCOUNTER (OUTPATIENT)
Dept: PHYSICAL THERAPY | Age: 88
Discharge: HOME | End: 2023-01-06
Payer: MEDICARE

## 2023-01-06 PROCEDURE — 97140 MANUAL THERAPY 1/> REGIONS: CPT | Performed by: PHYSICAL THERAPIST

## 2023-01-06 PROCEDURE — 97110 THERAPEUTIC EXERCISES: CPT | Performed by: PHYSICAL THERAPIST

## 2023-01-06 NOTE — PROGRESS NOTES
PT DAILY TREATMENT NOTE - [facility] 2-15    Patient Name: Heather Rojo  Date:2023  : 1935  [x]  Patient  Verified  Payor: [place] MEDICARE / Plan: VA MEDICARE PART A & B / Product Type: Medicare /    In time: 900 A  Out time: 940 A  Total Treatment Time (min): 40  Total Timed Codes (min): 40  1:1 Treatment Time ( only): 30   Visit #:  7    Treatment Area: Right hip pain [M25.551]    SUBJECTIVE  Pain Level (0-10 scale): 0  Any medication changes, allergies to medications, adverse drug reactions, diagnosis change, or new procedure performed?: [x] No    [] Yes (see summary sheet for update)  Subjective functional status/changes:   [] No changes reported  Pt reports that neurologist appt went well yesterday.  She is overall feeling pretty good; she has lost 20 lbs since Sept    OBJECTIVE    Modality rationale:    Min Type Additional Details       [] Estim: []Att   []Unatt    []TENS instruct                  []IFC  []Premod   []NMES                     []Other:  []w/US   []w/ice   []w/heat  Position:  Location:       []  Traction: [] Cervical       []Lumbar                       [] Prone          []Supine                       []Intermittent   []Continuous Lbs:  [] before manual  [] after manual  []w/heat    []  Ultrasound: []Continuous   [] Pulsed                       at: []1MHz   []3MHz Location:  W/cm2:    [] Paraffin         Location:   []w/heat   decl []  Ice     []  Heat  []  Ice massage Position:  Location:    []  Laser  []  Other: Position:  Location:      []  Vasopneumatic Device Pressure:       [] lo [] med [] hi   Temperature:      [x] Skin assessment post-treatment:  [x]intact []redness- no adverse reaction    []redness - adverse reaction:     30 min Therapeutic Exercise:  [x] See flow sheet : progressed per flow sheet   Rationale: increase ROM, increase strength, improve coordination, improve balance, and increase proprioception to improve the patients ability to perform ADL's, daily chores with dec'd symptoms    10 min Manual Therapy: grade II-III inf hip mobs with LE in 90/90 position  Use of belt for inf/lat grade II-III R hip mobs    Rationale: decrease pain and increase ROM to improve the patients ability to perform ADL's, daily chores            With   [] TE   [] TA   [] Neuro   [] SC   [] other: Patient Education: [x] Review HEP    [] Progressed/Changed HEP based on:   [] positioning   [] body mechanics   [] transfers   [] heat/ice application    [] other:      Other Objective/Functional Measures: n/a     Pain Level (0-10 scale) post treatment: 0/10    ASSESSMENT/Changes in Function:   Good tolerance to exercise progression. Progressing well with PT  Patient will continue to benefit from skilled PT services to modify and progress therapeutic interventions, address functional mobility deficits, address ROM deficits, address strength deficits, analyze and address soft tissue restrictions, analyze and cue movement patterns, and assess and modify postural abnormalities to attain remaining goals.      [x]  See Plan of Care  []  See progress note/recertification  []  See Discharge Summary         Progress towards goals / Updated goals:  NT    PLAN  [x]  Upgrade activities as tolerated     [x]  Continue plan of care  []  Update interventions per flow sheet       []  Discharge due to:_  []  Other:_      Cameron Dominguez, PT 1/6/2023

## 2023-01-10 ENCOUNTER — HOSPITAL ENCOUNTER (OUTPATIENT)
Dept: PHYSICAL THERAPY | Age: 88
Discharge: HOME | End: 2023-01-10
Payer: MEDICARE

## 2023-01-10 PROCEDURE — 97140 MANUAL THERAPY 1/> REGIONS: CPT | Performed by: PHYSICAL THERAPIST

## 2023-01-10 PROCEDURE — 97110 THERAPEUTIC EXERCISES: CPT | Performed by: PHYSICAL THERAPIST

## 2023-01-10 NOTE — PROGRESS NOTES
PT DAILY TREATMENT NOTE - [facility] 2-15    Patient Name: Claus Rodriguez  Date:1/10/2023  : 1935  [x]  Patient  Verified  Payor: [place] MEDICARE / Plan: VA MEDICARE PART A & B / Product Type: Medicare /    In time: 56 A  Out time: 11:15 A  Total Treatment Time (min): 45  Total Timed Codes (min): 45  1:1 Treatment Time ( [address] only): 30   Visit #:  8    Treatment Area: Right hip pain [M25.551]    SUBJECTIVE  Pain Level (0-10 scale): 0  Any medication changes, allergies to medications, adverse drug reactions, diagnosis change, or new procedure performed?: [x] No    [] Yes (see summary sheet for update)  Subjective functional status/changes:   [] No changes reported  Pt reports that her hip has been feeling so much better since beginning PT.  She has not felt her hip give out on her since beginning PT    OBJECTIVE    Modality rationale:    Min Type Additional Details       [] Estim: []Att   []Unatt    []TENS instruct                  []IFC  []Premod   []NMES                     []Other:  []w/US   []w/ice   []w/heat  Position:  Location:       []  Traction: [] Cervical       []Lumbar                       [] Prone          []Supine                       []Intermittent   []Continuous Lbs:  [] before manual  [] after manual  []w/heat    []  Ultrasound: []Continuous   [] Pulsed                       at: []1MHz   []3MHz Location:  W/cm2:    [] Paraffin         Location:   []w/heat   decl []  Ice     []  Heat  []  Ice massage Position:  Location:    []  Laser  []  Other: Position:  Location:      []  Vasopneumatic Device Pressure:       [] lo [] med [] hi   Temperature:      [x] Skin assessment post-treatment:  [x]intact []redness- no adverse reaction    []redness - adverse reaction:     35 min Therapeutic Exercise:  [x] See flow sheet : progressed per flow sheet   Rationale: increase ROM, increase strength, improve coordination, improve balance, and increase proprioception to improve the patients ability to perform ADL's, daily chores with dec'd symptoms    10 min Manual Therapy: grade II-III inf hip mobs with LE in 90/90 position  Use of belt for inf/lat grade II-III R hip mobs    Rationale: decrease pain and increase ROM to improve the patients ability to perform ADL's, daily chores            With   [] TE   [] TA   [] Neuro   [] SC   [] other: Patient Education: [x] Review HEP    [] Progressed/Changed HEP based on:   [] positioning   [] body mechanics   [] transfers   [] heat/ice application    [] other:      Other Objective/Functional Measures: n/a     Pain Level (0-10 scale) post treatment: 0/10    ASSESSMENT/Changes in Function:   Challenged with SLS. Overall progressing very well. Patient will continue to benefit from skilled PT services to modify and progress therapeutic interventions, address functional mobility deficits, address ROM deficits, address strength deficits, analyze and address soft tissue restrictions, analyze and cue movement patterns, and assess and modify postural abnormalities to attain remaining goals.      [x]  See Plan of Care  []  See progress note/recertification  []  See Discharge Summary         Progress towards goals / Updated goals:  NT    PLAN  [x]  Upgrade activities as tolerated     [x]  Continue plan of care  []  Update interventions per flow sheet       []  Discharge due to:_  []  Other:_      Shaan Sands, PT 1/10/2023

## 2023-01-12 ENCOUNTER — HOSPITAL ENCOUNTER (OUTPATIENT)
Dept: PHYSICAL THERAPY | Age: 88
Discharge: HOME | End: 2023-01-12
Payer: MEDICARE

## 2023-01-12 PROCEDURE — 97110 THERAPEUTIC EXERCISES: CPT | Performed by: PHYSICAL THERAPIST

## 2023-01-12 PROCEDURE — 97140 MANUAL THERAPY 1/> REGIONS: CPT | Performed by: PHYSICAL THERAPIST

## 2023-01-12 NOTE — PROGRESS NOTES
PT DAILY TREATMENT NOTE - [facility] 2-15    Patient Name: Kamran Villarreal  Date:2023  : 1935  [x]  Patient  Verified  Payor: [place] MEDICARE / Plan: [place] MEDICARE PART A & B / Product Type: Medicare /    In time: 6019 A  Out time: 11:20 A  Total Treatment Time (min): 50  Total Timed Codes (min): 50  1:1 Treatment Time ( [address] only): 45   Visit #:  9    Treatment Area: Right hip pain [M25.551]    SUBJECTIVE  Pain Level (0-10 scale): 0  Any medication changes, allergies to medications, adverse drug reactions, diagnosis change, or new procedure performed?: [x] No    [] Yes (see summary sheet for update)  Subjective functional status/changes:   [] No changes reported  Pt reports that she had difficulty with her hip [place]  Her hip felt weak when trying to walk up stairs    OBJECTIVE    Modality rationale:    Min Type Additional Details       [] Estim: []Att   []Unatt    []TENS instruct                  []IFC  []Premod   []NMES                     []Other:  []w/US   []w/ice   []w/heat  Position:  Location:       []  Traction: [] Cervical       []Lumbar                       [] Prone          []Supine                       []Intermittent   []Continuous Lbs:  [] before manual  [] after manual  []w/heat    []  Ultrasound: []Continuous   [] Pulsed                       at: []1MHz   []3MHz Location:  W/cm2:    [] Paraffin         Location:   []w/heat   decl []  Ice     []  Heat  []  Ice massage Position:  Location:    []  Laser  []  Other: Position:  Location:      []  Vasopneumatic Device Pressure:       [] lo [] med [] hi   Temperature:      [x] Skin assessment post-treatment:  [x]intact []redness- no adverse reaction    []redness - adverse reaction:     40 min Therapeutic Exercise:  [x] See flow sheet : progressed per flow sheet   Rationale: increase ROM, increase strength, improve coordination, improve balance, and increase proprioception to improve the patients ability to perform ADL's, daily chores with dec'd symptoms    10 min Manual Therapy: grade II-III inf hip mobs with LE in 90/90 position  Use of belt for inf/lat grade II-III R hip mobs    Rationale: decrease pain and increase ROM to improve the patients ability to perform ADL's, daily chores            With   [] TE   [] TA   [] Neuro   [] SC   [] other: Patient Education: [x] Review HEP    [] Progressed/Changed HEP based on:   [] positioning   [] body mechanics   [] transfers   [] heat/ice application    [] other:      Other Objective/Functional Measures: n/a     Pain Level (0-10 scale) post treatment: 0/10    ASSESSMENT/Changes in Function:   Muscle fatigue noted with supine QS with SLR. Improvement in symptoms after manual techniques  Patient will continue to benefit from skilled PT services to modify and progress therapeutic interventions, address functional mobility deficits, address ROM deficits, address strength deficits, analyze and address soft tissue restrictions, analyze and cue movement patterns, and assess and modify postural abnormalities to attain remaining goals.      [x]  See Plan of Care  []  See progress note/recertification  []  See Discharge Summary         Progress towards goals / Updated goals:  NT    PLAN  [x]  Upgrade activities as tolerated     [x]  Continue plan of care  []  Update interventions per flow sheet       []  Discharge due to:_  []  Other:_      Mel Stroud, PT 1/12/2023

## 2023-01-17 ENCOUNTER — HOSPITAL ENCOUNTER (OUTPATIENT)
Dept: PHYSICAL THERAPY | Age: 88
Discharge: HOME | End: 2023-01-17
Payer: MEDICARE

## 2023-01-17 PROCEDURE — 97140 MANUAL THERAPY 1/> REGIONS: CPT | Performed by: PHYSICAL THERAPIST

## 2023-01-17 PROCEDURE — 97110 THERAPEUTIC EXERCISES: CPT | Performed by: PHYSICAL THERAPIST

## 2023-01-17 NOTE — PROGRESS NOTES
PT DAILY TREATMENT/progress NOTE - [facility] 2-15    Patient Name: Nora Sarkar  Date:2023  : 1935  [x]  Patient  Verified  Payor: [place] MEDICARE / Plan: [place] MEDICARE PART A & B / Product Type: Medicare /    In time: 3246 A  Out time: 11:20 A  Total Treatment Time (min): 50  Total Timed Codes (min): 50  1:1 Treatment Time ( [address] only): 30  Visit #:  10    Treatment Area: Right hip pain [M25.551]    SUBJECTIVE  Pain Level (0-10 scale): 0  Any medication changes, allergies to medications, adverse drug reactions, diagnosis change, or new procedure performed?: [x] No    [] Yes (see summary sheet for update)  Subjective functional status/changes:   [] No changes reported  \"When I leave here I feel like a million bucks! \" States that as long as she keeps up with her exercises she feels pretty good. She still is challenged with standing on 1 leg.  She feels like the manual techniques have helped a lot  Reports that she feels \"pretty close to 100%\" when she leaves PT    OBJECTIVE    Modality rationale:    Min Type Additional Details       [] Estim: []Att   []Unatt    []TENS instruct                  []IFC  []Premod   []NMES                     []Other:  []w/US   []w/ice   []w/heat  Position:  Location:       []  Traction: [] Cervical       []Lumbar                       [] Prone          []Supine                       []Intermittent   []Continuous Lbs:  [] before manual  [] after manual  []w/heat    []  Ultrasound: []Continuous   [] Pulsed                       at: []1MHz   []3MHz Location:  W/cm2:    [] Paraffin         Location:   []w/heat   decl []  Ice     []  Heat  []  Ice massage Position:  Location:    []  Laser  []  Other: Position:  Location:      []  Vasopneumatic Device Pressure:       [] lo [] med [] hi   Temperature:      [x] Skin assessment post-treatment:  [x]intact []redness- no adverse reaction    []redness - adverse reaction:     40 min Therapeutic Exercise:  [x] See flow sheet : progressed per flow sheet   Rationale: increase ROM, increase strength, improve coordination, improve balance, and increase proprioception to improve the patients ability to perform ADL's, daily chores with dec'd symptoms    10 min Manual Therapy: grade II-III inf hip mobs with LE in 90/90 position  Use of belt for inf/lat grade II-III R hip mobs    Rationale: decrease pain and increase ROM to improve the patients ability to perform ADL's, daily chores            With   [] TE   [] TA   [] Neuro   [] SC   [] other: Patient Education: [x] Review HEP    [] Progressed/Changed HEP based on:   [] positioning   [] body mechanics   [] transfers   [] heat/ice application    [] other:      Other Objective/Functional Measures:   Strength (1-5)                  Right Left   Hip flexion 3+ 4   Knee flex 5 5   Knee ext 5 5     Bridge WNL  Supine SLR R and L WNL                Functional Biomechanical Screen  Tandem stance ~7-8 sec ea dir; mod/max sway with hip strategy     Pain Level (0-10 scale) post treatment: 0/10    ASSESSMENT/Changes in Function:   Pt has completed 10 skilled PT visits. She demonstrates improvement in proprioception, strength, functional abilities, dec'd pain levels since beginning PT. She is continuing to benefit from PT with exercise progression and manual techniques. Continue PT 1-2x/wk for additional 4-6 weeks. Patient will continue to benefit from skilled PT services to modify and progress therapeutic interventions, address functional mobility deficits, address ROM deficits, address strength deficits, analyze and address soft tissue restrictions, analyze and cue movement patterns, and assess and modify postural abnormalities to attain remaining goals. [x]  See Plan of Care  []  See progress note/recertification  []  See Discharge Summary         Progress towards goals / Updated goals:  Short Term Goals:  To be accomplished in 2-3 weeks:  1) Pt will be independent in initial HEP MET  2) Pt will report >/=25% improvement in symptoms MET  3) Pt will report compliance with modalities for pain management MET     Long Term Goals:  To be accomplished in 10-12 weeks: progressing  1) Pt will report being able to walk along track at gym  2) Pt will report >/=50% improvement in symptoms  3) Pt will perform tandem stance >/=10 sec ea dir to demonstrate improvement in balance    PLAN  [x]  Upgrade activities as tolerated     [x]  Continue plan of care  []  Update interventions per flow sheet       []  Discharge due to:_  []  Other:_      Makeda Farrell, PT 1/17/2023

## 2023-01-19 ENCOUNTER — APPOINTMENT (OUTPATIENT)
Dept: PHYSICAL THERAPY | Age: 88
End: 2023-01-19
Payer: MEDICARE

## 2023-01-24 ENCOUNTER — HOSPITAL ENCOUNTER (OUTPATIENT)
Dept: PHYSICAL THERAPY | Age: 88
Discharge: HOME | End: 2023-01-24
Payer: MEDICARE

## 2023-01-24 PROCEDURE — 97110 THERAPEUTIC EXERCISES: CPT

## 2023-01-24 PROCEDURE — 97140 MANUAL THERAPY 1/> REGIONS: CPT

## 2023-01-24 NOTE — PROGRESS NOTES
PT DAILY TREATMENT - [facility] 2-15    Patient Name: Kecia Ponce  Date:2023  : 1935  [x]  Patient  Verified  Payor: VA MEDICARE / Plan: VA MEDICARE PART A & B / Product Type: Medicare /    In time: 1000 A  Out time: 1050 A  Total Treatment Time (min): 50  Total Timed Codes (min): 45  1:1 Treatment Time ( [address] only): 25  Visit #:  11    Treatment Area: Right hip pain [M25.551]    SUBJECTIVE  Pain Level (0-10 scale): 0/10  Any medication changes, allergies to medications, adverse drug reactions, diagnosis change, or new procedure performed?: [x] No    [] Yes (see summary sheet for update)  Subjective functional status/changes:   [] No changes reported  Pt reports that she had an episode of R hip pain with attempting to step over an item in the home; otherwise minimal pain R hip     OBJECTIVE    Modality rationale:    Min Type Additional Details       [] Estim: []Att   []Unatt    []TENS instruct                  []IFC  []Premod   []NMES                     []Other:  []w/US   []w/ice   []w/heat  Position:  Location:       []  Traction: [] Cervical       []Lumbar                       [] Prone          []Supine                       []Intermittent   []Continuous Lbs:  [] before manual  [] after manual  []w/heat    []  Ultrasound: []Continuous   [] Pulsed                       at: []1MHz   []3MHz Location:  W/cm2:    [] Paraffin         Location:   []w/heat   decl []  Ice     []  Heat  []  Ice massage Position:  Location:    []  Laser  []  Other: Position:  Location:      []  Vasopneumatic Device Pressure:       [] lo [] med [] hi   Temperature:      [x] Skin assessment post-treatment:  [x]intact []redness- no adverse reaction    []redness - adverse reaction:     35 min Therapeutic Exercise:  [x] See flow sheet : progressed per flow sheet   Rationale: increase ROM, increase strength, improve coordination, improve balance, and increase proprioception to improve the patients ability to perform ADL's, daily chores with dec'd symptoms    10 min Manual Therapy: grade II-III inf hip mobs with LE in 90/90 position  Use of belt for inf/lat grade II-III R hip mobs    Rationale: decrease pain and increase ROM to improve the patients ability to perform ADL's, daily chores            With   [] TE   [] TA   [] Neuro   [] SC   [] other: Patient Education: [x] Review HEP    [] Progressed/Changed HEP based on:   [] positioning   [] body mechanics   [] transfers   [] heat/ice application    [] other:      Other Objective/Functional Measures:       Pain Level (0-10 scale) post treatment: 0/10    ASSESSMENT/Changes in Function:     Patient experienced difficulty and discomfort R hip with eccentric lowering during step up exercises leading with L LE, able to better tolerate with decreased speed and controlled movement    Patient will continue to benefit from skilled PT services to modify and progress therapeutic interventions, address functional mobility deficits, address ROM deficits, address strength deficits, analyze and address soft tissue restrictions, analyze and cue movement patterns, and assess and modify postural abnormalities to attain remaining goals. [x]  See Plan of Care  []  See progress note/recertification  []  See Discharge Summary         Progress towards goals / Updated goals:  Short Term Goals: To be accomplished in 2-3 weeks:  1) Pt will be independent in initial HEP MET  2) Pt will report >/=25% improvement in symptoms MET  3) Pt will report compliance with modalities for pain management MET     Long Term Goals:  To be accomplished in 10-12 weeks: progressing  1) Pt will report being able to walk along track at gym  2) Pt will report >/=50% improvement in symptoms  3) Pt will perform tandem stance >/=10 sec ea dir to demonstrate improvement in balance    PLAN  [x]  Upgrade activities as tolerated     [x]  Continue plan of care  []  Update interventions per flow sheet       []  Discharge due to:_  []  Other:_ Raeann Harman, PT 1/24/2023

## 2023-01-26 ENCOUNTER — HOSPITAL ENCOUNTER (OUTPATIENT)
Dept: PHYSICAL THERAPY | Age: 88
Discharge: HOME | End: 2023-01-26
Payer: MEDICARE

## 2023-01-26 PROCEDURE — 97110 THERAPEUTIC EXERCISES: CPT | Performed by: PHYSICAL THERAPIST

## 2023-01-26 PROCEDURE — 97140 MANUAL THERAPY 1/> REGIONS: CPT | Performed by: PHYSICAL THERAPIST

## 2023-01-26 NOTE — PROGRESS NOTES
PT DAILY TREATMENT/Discharge NOTE - [facility] 2-15    Patient Name: Claus Rodriguez  Date:2023  : 1935  [x]  Patient  Verified  Payor: VA MEDICARE / Plan: VA MEDICARE PART A & B / Product Type: Medicare /    In time: 1000 A  Out time: 7367 A  Total Treatment Time (min): 45  Total Timed Codes (min): 45  1:1 Treatment Time ( [address] only): 30  Visit #:  12    Treatment Area: Right hip pain [M25.551]    SUBJECTIVE  Pain Level (0-10 scale): 0/10  Any medication changes, allergies to medications, adverse drug reactions, diagnosis change, or new procedure performed?: [x] No    [] Yes (see summary sheet for update)  Subjective functional status/changes:   [] No changes reported  Pt reports very good compliance with HEP. She goes go the gym 3 days/wk.  Her hip feels much better since beginning PT    OBJECTIVE    Modality rationale:    Min Type Additional Details       [] Estim: []Att   []Unatt    []TENS instruct                  []IFC  []Premod   []NMES                     []Other:  []w/US   []w/ice   []w/heat  Position:  Location:       []  Traction: [] Cervical       []Lumbar                       [] Prone          []Supine                       []Intermittent   []Continuous Lbs:  [] before manual  [] after manual  []w/heat    []  Ultrasound: []Continuous   [] Pulsed                       at: []1MHz   []3MHz Location:  W/cm2:    [] Paraffin         Location:   []w/heat   decl []  Ice     []  Heat  []  Ice massage Position:  Location:    []  Laser  []  Other: Position:  Location:      []  Vasopneumatic Device Pressure:       [] lo [] med [] hi   Temperature:      [x] Skin assessment post-treatment:  [x]intact []redness- no adverse reaction    []redness - adverse reaction:     35 min Therapeutic Exercise:  [x] See flow sheet : progressed per flow sheet   Rationale: increase ROM, increase strength, improve coordination, improve balance, and increase proprioception to improve the patients ability to perform ADL's, daily chores with dec'd symptoms    10 min Manual Therapy: grade II-III inf hip mobs with LE in 90/90 position  Use of belt for inf/lat grade II-III R hip mobs    Rationale: decrease pain and increase ROM to improve the patients ability to perform ADL's, daily chores            With   [] TE   [] TA   [] Neuro   [] SC   [] other: Patient Education: [x] Review HEP    [] Progressed/Changed HEP based on:   [] positioning   [] body mechanics   [] transfers   [] heat/ice application    [] other:      Other Objective/Functional Measures:   R and L SLS >10 sec  Tandem stance >20 sec ea dir    Pain Level (0-10 scale) post treatment: 0/10    ASSESSMENT/Changes in Function:   Pt has completed 12 skilled PT visits. Reports \"close to 100%\" improvement in symptoms since beginning PT. She demonstrates improvement in balance, improvement in ability to perform functional daily activities and dec'd pain since beginning PT. She continues to have inc'd symptoms with stairs. She is very compliant with HEP daily and gym routine 3x/wk. Discussed importance of continuing with exercises independently at this time as her symptoms are minimal to none on a daily basis. Ready for D/C at this time. [x]  See Discharge Summary         Progress towards goals / Updated goals:  Short Term Goals: To be accomplished in 2-3 weeks:  1) Pt will be independent in initial HEP MET  2) Pt will report >/=25% improvement in symptoms MET  3) Pt will report compliance with modalities for pain management MET     Long Term Goals:  To be accomplished in 10-12 weeks:   1) Pt will report being able to walk along track at gym nt  2) Pt will report >/=50% improvement in symptoms MET  3) Pt will perform tandem stance >/=10 sec ea dir to demonstrate improvement in balance MET    PLAN        Discharge due to: independent in [facility], PT 1/26/2023

## 2023-02-21 ENCOUNTER — OFFICE VISIT (OUTPATIENT)
Dept: INTERNAL MEDICINE CLINIC | Age: 88
End: 2023-02-21
Payer: MEDICARE

## 2023-02-21 VITALS
WEIGHT: 158 LBS | DIASTOLIC BLOOD PRESSURE: 74 MMHG | HEART RATE: 67 BPM | HEIGHT: 66 IN | SYSTOLIC BLOOD PRESSURE: 130 MMHG | OXYGEN SATURATION: 98 % | RESPIRATION RATE: 18 BRPM | BODY MASS INDEX: 25.39 KG/M2

## 2023-02-21 DIAGNOSIS — R19.04 ABDOMINAL WALL MASS OF LEFT LOWER QUADRANT: Primary | ICD-10-CM

## 2023-02-21 PROCEDURE — G8536 NO DOC ELDER MAL SCRN: HCPCS | Performed by: INTERNAL MEDICINE

## 2023-02-21 PROCEDURE — G0463 HOSPITAL OUTPT CLINIC VISIT: HCPCS | Performed by: INTERNAL MEDICINE

## 2023-02-21 PROCEDURE — 1090F PRES/ABSN URINE INCON ASSESS: CPT | Performed by: INTERNAL MEDICINE

## 2023-02-21 PROCEDURE — 99212 OFFICE O/P EST SF 10 MIN: CPT | Performed by: INTERNAL MEDICINE

## 2023-02-21 PROCEDURE — G8417 CALC BMI ABV UP PARAM F/U: HCPCS | Performed by: INTERNAL MEDICINE

## 2023-02-21 PROCEDURE — 1101F PT FALLS ASSESS-DOCD LE1/YR: CPT | Performed by: INTERNAL MEDICINE

## 2023-02-21 PROCEDURE — G8510 SCR DEP NEG, NO PLAN REQD: HCPCS | Performed by: INTERNAL MEDICINE

## 2023-02-21 PROCEDURE — G8427 DOCREV CUR MEDS BY ELIG CLIN: HCPCS | Performed by: INTERNAL MEDICINE

## 2023-02-21 NOTE — PROGRESS NOTES
Parth Serrano is a 82 y.o. female who was seen in clinic today (2/21/2023) for an acute visit. Assessment & Plan:   Below is the assessment and plan developed based on review of pertinent history, physical exam, labs, studies, and medications. 1. Abdominal wall mass of left lower quadrant  Comments:  new dx, differential reviewed, favor hernia. Reviewed w/u (imaging or see surgeon). No red flags. Expectations & red flags reviewed w/ her. Will monitor for now    Follow-up and Dispositions    Return if symptoms worsen or fail to improve. Subjective/Objective:   Danica was seen today for Mass    Dermatology Review  She is here to talk about a mass on the LLQ of her abdomen. She noticed it 2 weeks ago, with no changes since that time. Symptoms include nothing. No pain or skin changes. It is not palpable when laying down. No trauma. She has lost weight (11 lbs) with PT and life style changes since last visit. Prior to Admission medications    Medication Sig Start Date End Date Taking? Authorizing Provider   Biotin 2,500 mcg cap Take  by mouth. Yes Provider, Historical   amLODIPine (NORVASC) 5 mg tablet TAKE 1 TABLET DAILY 7/31/22  Yes Luciano Olivares MD   fexofenadine (ALLEGRA) 180 mg tablet Take 180 mg by mouth daily. Yes Provider, Historical   LUMIGAN 0.01 % ophthalmic drops  2/8/18  Yes Provider, Historical   ESTRACE 0.01 % (0.1 mg/gram) vaginal cream  6/27/17  Yes Provider, Historical   cholecalciferol (VITAMIN D3) (1000 Units /25 mcg) tablet Take  by mouth daily. Yes Provider, Historical   CALCIUM CARBONATE/VITAMIN D3 (CALCIUM + D PO) Take  by mouth daily. Yes Provider, Historical   CARBOXYMETHYLCELLULOSE SODIUM (THERA TEARS OP) Apply  to eye four (4) times daily.    Yes Provider, Historical           Physical Exam  Abdominal:           Visit Vitals  /74   Pulse 67   Resp 18   Ht 5' 6\" (1.676 m)   Wt 158 lb (71.7 kg)   SpO2 98%   BMI 25.50 kg/m²         Advised her to call back or return to office if symptoms worsen/change/persist.  Discussed expected course/resolution/complications of diagnosis in detail with patient. Medication risks/benefits/costs/interactions/alternatives discussed with patient.     Lisseth Ortez MD

## 2023-07-10 RX ORDER — AMLODIPINE BESYLATE 5 MG/1
TABLET ORAL
Qty: 90 TABLET | Refills: 0 | Status: SHIPPED | OUTPATIENT
Start: 2023-07-10

## 2023-07-10 NOTE — TELEPHONE ENCOUNTER
Chief Complaint   Patient presents with    Medication Refill     Last Appointment with Dr. Amy Tomas: 23    Future Appointments   Date Time Provider [address]   2023 11:00 AM [facility]  ROMMEL BS AMB   2023 10:20 AM MD ANOOP Menon BS AMB     Ann-Marie Schulz

## 2023-07-11 ENCOUNTER — OFFICE VISIT (OUTPATIENT)
Age: 88
End: 2023-07-11
Payer: MEDICARE

## 2023-07-11 ENCOUNTER — CLINICAL DOCUMENTATION (OUTPATIENT)
Age: 88
End: 2023-07-11

## 2023-07-11 VITALS
SYSTOLIC BLOOD PRESSURE: 140 MMHG | DIASTOLIC BLOOD PRESSURE: 78 MMHG | OXYGEN SATURATION: 95 % | HEIGHT: 66 IN | HEART RATE: 68 BPM | BODY MASS INDEX: 24.59 KG/M2 | WEIGHT: 153 LBS

## 2023-07-11 DIAGNOSIS — R26.81 GAIT INSTABILITY: ICD-10-CM

## 2023-07-11 DIAGNOSIS — G20 PARKINSON'S DISEASE (HCC): Primary | ICD-10-CM

## 2023-07-11 DIAGNOSIS — G62.9 POLYNEUROPATHY, UNSPECIFIED: ICD-10-CM

## 2023-07-11 DIAGNOSIS — R73.03 PREDIABETES: ICD-10-CM

## 2023-07-11 PROCEDURE — 1123F ACP DISCUSS/DSCN MKR DOCD: CPT | Performed by: PSYCHIATRY & NEUROLOGY

## 2023-07-11 PROCEDURE — 99214 OFFICE O/P EST MOD 30 MIN: CPT | Performed by: PSYCHIATRY & NEUROLOGY

## 2023-07-11 PROCEDURE — G8427 DOCREV CUR MEDS BY ELIG CLIN: HCPCS | Performed by: PSYCHIATRY & NEUROLOGY

## 2023-07-11 PROCEDURE — 3077F SYST BP >= 140 MM HG: CPT | Performed by: PSYCHIATRY & NEUROLOGY

## 2023-07-11 PROCEDURE — 3078F DIAST BP <80 MM HG: CPT | Performed by: PSYCHIATRY & NEUROLOGY

## 2023-07-11 PROCEDURE — 1090F PRES/ABSN URINE INCON ASSESS: CPT | Performed by: PSYCHIATRY & NEUROLOGY

## 2023-07-11 PROCEDURE — 1036F TOBACCO NON-USER: CPT | Performed by: PSYCHIATRY & NEUROLOGY

## 2023-07-11 PROCEDURE — G8420 CALC BMI NORM PARAMETERS: HCPCS | Performed by: PSYCHIATRY & NEUROLOGY

## 2023-07-11 PROCEDURE — G8399 PT W/DXA RESULTS DOCUMENT: HCPCS | Performed by: PSYCHIATRY & NEUROLOGY

## 2023-07-11 RX ORDER — CALCIUM CARBONATE 500(1250)
500 TABLET ORAL DAILY
COMMUNITY

## 2023-07-11 ASSESSMENT — PATIENT HEALTH QUESTIONNAIRE - PHQ9
SUM OF ALL RESPONSES TO PHQ QUESTIONS 1-9: 0
SUM OF ALL RESPONSES TO PHQ9 QUESTIONS 1 & 2: 0
2. FEELING DOWN, DEPRESSED OR HOPELESS: 0
SUM OF ALL RESPONSES TO PHQ QUESTIONS 1-9: 0
1. LITTLE INTEREST OR PLEASURE IN DOING THINGS: 0
SUM OF ALL RESPONSES TO PHQ QUESTIONS 1-9: 0
SUM OF ALL RESPONSES TO PHQ QUESTIONS 1-9: 0

## 2023-07-11 NOTE — PROGRESS NOTES
Faxed referral to [facility] PT received confirmation it was sent successfully

## 2023-07-11 NOTE — PROGRESS NOTES
Neurology Clinic Follow up Note    Patient ID:  Shamika Clemente  [id number]  1935      Ms. Alyssia Burton is here for follow up today of  Chief Complaint   Patient presents with    Other     Pt returning for H/O Parkinsonian tremor of Rt hand, polyneuropathy both feet possible nerve damage to rt leg. Pt reports things are about the same. Last Appointment With Me:  1/5/23      Interval History:   R hand tremors are persistent/unchanged, primarily at rest. These occasionally interfere with her writing. Denies bradykinesia, significant rigidity. She admits to some gait instability due to R hip arthritis. No falls. She reports LE distal numbness is stable, no significant dysesthesias. Labs did reveal evidence of prediabetes. PMHx/ PSHx/ FHx/ SHx:  Reviewed and unchanged previous visit. 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        Objective:       Meds:  Current Outpatient Medications   Medication Sig Dispense Refill    calcium carbonate (OSCAL) 500 MG TABS tablet Take 1 tablet by mouth daily      amLODIPine (NORVASC) 5 MG tablet TAKE 1 TABLET DAILY 90 tablet 0    bimatoprost (LUMIGAN) 0.01 % SOLN ophthalmic drops Place into both eyes nightly ceived the following from [facility]: Outside name: LUMIGAN 0.01 % ophthalmic drops      Biotin 2.5 MG CAPS Take by mouth daily      vitamin D (CHOLECALCIFEROL) 25 MCG (1000 UT) TABS tablet Take by mouth daily      estradiol (ESTRACE) 0.1 MG/GM vaginal cream daily ceived the following from [facility]: Outside name: ESTRACE 0.01 % (0.1 mg/gram) vaginal cream      fexofenadine (ALLEGRA) 180 MG tablet Take 1 tablet by mouth daily       No current

## 2023-07-17 ENCOUNTER — OFFICE VISIT (OUTPATIENT)
Age: 88
End: 2023-07-17
Payer: MEDICARE

## 2023-07-17 VITALS
WEIGHT: 158 LBS | HEART RATE: 71 BPM | OXYGEN SATURATION: 95 % | SYSTOLIC BLOOD PRESSURE: 144 MMHG | DIASTOLIC BLOOD PRESSURE: 72 MMHG | BODY MASS INDEX: 25.5 KG/M2 | TEMPERATURE: 97.8 F

## 2023-07-17 DIAGNOSIS — M70.62 TROCHANTERIC BURSITIS OF LEFT HIP: Primary | ICD-10-CM

## 2023-07-17 PROCEDURE — 3077F SYST BP >= 140 MM HG: CPT | Performed by: INTERNAL MEDICINE

## 2023-07-17 PROCEDURE — 1123F ACP DISCUSS/DSCN MKR DOCD: CPT | Performed by: INTERNAL MEDICINE

## 2023-07-17 PROCEDURE — G8419 CALC BMI OUT NRM PARAM NOF/U: HCPCS | Performed by: INTERNAL MEDICINE

## 2023-07-17 PROCEDURE — 99212 OFFICE O/P EST SF 10 MIN: CPT | Performed by: INTERNAL MEDICINE

## 2023-07-17 PROCEDURE — 3078F DIAST BP <80 MM HG: CPT | Performed by: INTERNAL MEDICINE

## 2023-07-17 PROCEDURE — G8399 PT W/DXA RESULTS DOCUMENT: HCPCS | Performed by: INTERNAL MEDICINE

## 2023-07-17 PROCEDURE — G8427 DOCREV CUR MEDS BY ELIG CLIN: HCPCS | Performed by: INTERNAL MEDICINE

## 2023-07-17 PROCEDURE — 1036F TOBACCO NON-USER: CPT | Performed by: INTERNAL MEDICINE

## 2023-07-17 PROCEDURE — 1090F PRES/ABSN URINE INCON ASSESS: CPT | Performed by: INTERNAL MEDICINE

## 2023-07-17 NOTE — PROGRESS NOTES
Alisa Wilson is a 82 y.o. female who was seen in clinic today (7/17/2023) for an acute visit. She RTC with her daughter. Assessment & Plan:   Below is the assessment and plan developed based on review of pertinent history, physical exam, labs, studies, and medications. 1. Trochanteric bursitis of left hip  Comments:  new dx, differential dx reviewed, is improving. Will start exercising and rest.  Red flags & expectations reviewed     Return if symptoms worsen or fail to improve. Subjective/Objective:   Micky was seen today for Hip Pain (X 5days)    Pain Review:  She presents do to pain of her left hip that is secondary to no known injury and started on 7/13. She went to start up after dinner. Since it started her pain has improved. She describes the pain as sharp and stabbing. It is intermittent. It was initially in the L thigh and now is on the outside of her hip. The pain radiates: no where. Exacerbating factors identifiable by patient are standing. She has tried the following: NSAID's (Advil), cane, and rest.  These have been: somewhat helpful. Previous workup: no. She has a h/o R hip pain. Prior to Admission medications    Medication Sig Start Date End Date Taking?  Authorizing Provider   calcium carbonate (OSCAL) 500 MG TABS tablet Take 1 tablet by mouth daily   Yes Historical Provider, MD   amLODIPine (NORVASC) 5 MG tablet TAKE 1 TABLET DAILY 7/10/23  Yes Macey Camp MD   bimatoprost (LUMIGAN) 0.01 % SOLN ophthalmic drops Place into both eyes nightly ceived the following from [facility]: Outside name: LUMIGAN 0.01 % ophthalmic drops 2/8/18  Yes Ar Automatic Reconciliation   Biotin 2.5 MG CAPS Take by mouth daily   Yes Ar Automatic Reconciliation   vitamin D (CHOLECALCIFEROL) 25 MCG (1000 UT) TABS tablet Take by mouth daily   Yes Ar Automatic Reconciliation   estradiol (ESTRACE) 0.1 MG/GM vaginal cream daily ceived the following from [facility]

## 2023-07-25 ENCOUNTER — OFFICE VISIT (OUTPATIENT)
Age: 88
End: 2023-07-25
Payer: MEDICARE

## 2023-07-25 VITALS
BODY MASS INDEX: 26.42 KG/M2 | SYSTOLIC BLOOD PRESSURE: 156 MMHG | OXYGEN SATURATION: 98 % | HEIGHT: 65 IN | WEIGHT: 158.6 LBS | TEMPERATURE: 97.3 F | HEART RATE: 57 BPM | DIASTOLIC BLOOD PRESSURE: 69 MMHG | RESPIRATION RATE: 18 BRPM

## 2023-07-25 DIAGNOSIS — Z71.89 ADVANCED CARE PLANNING/COUNSELING DISCUSSION: ICD-10-CM

## 2023-07-25 DIAGNOSIS — R73.02 GLUCOSE INTOLERANCE (IMPAIRED GLUCOSE TOLERANCE): ICD-10-CM

## 2023-07-25 DIAGNOSIS — I10 WHITE COAT SYNDROME WITH HYPERTENSION: ICD-10-CM

## 2023-07-25 DIAGNOSIS — Z00.00 MEDICARE ANNUAL WELLNESS VISIT, INITIAL: Primary | ICD-10-CM

## 2023-07-25 DIAGNOSIS — M81.6 LOCALIZED OSTEOPOROSIS WITHOUT CURRENT PATHOLOGICAL FRACTURE: ICD-10-CM

## 2023-07-25 DIAGNOSIS — Z12.31 ENCOUNTER FOR SCREENING MAMMOGRAM FOR MALIGNANT NEOPLASM OF BREAST: ICD-10-CM

## 2023-07-25 PROCEDURE — 3077F SYST BP >= 140 MM HG: CPT | Performed by: INTERNAL MEDICINE

## 2023-07-25 PROCEDURE — 1123F ACP DISCUSS/DSCN MKR DOCD: CPT | Performed by: INTERNAL MEDICINE

## 2023-07-25 PROCEDURE — 3078F DIAST BP <80 MM HG: CPT | Performed by: INTERNAL MEDICINE

## 2023-07-25 PROCEDURE — G0439 PPPS, SUBSEQ VISIT: HCPCS | Performed by: INTERNAL MEDICINE

## 2023-07-25 PROCEDURE — 99497 ADVNCD CARE PLAN 30 MIN: CPT | Performed by: INTERNAL MEDICINE

## 2023-07-25 SDOH — ECONOMIC STABILITY: FOOD INSECURITY: WITHIN THE PAST 12 MONTHS, YOU WORRIED THAT YOUR FOOD WOULD RUN OUT BEFORE YOU GOT MONEY TO BUY MORE.: NEVER TRUE

## 2023-07-25 SDOH — ECONOMIC STABILITY: INCOME INSECURITY: HOW HARD IS IT FOR YOU TO PAY FOR THE VERY BASICS LIKE FOOD, HOUSING, MEDICAL CARE, AND HEATING?: NOT HARD AT ALL

## 2023-07-25 SDOH — ECONOMIC STABILITY: FOOD INSECURITY: WITHIN THE PAST 12 MONTHS, THE FOOD YOU BOUGHT JUST DIDN'T LAST AND YOU DIDN'T HAVE MONEY TO GET MORE.: NEVER TRUE

## 2023-07-25 SDOH — ECONOMIC STABILITY: HOUSING INSECURITY
IN THE LAST 12 MONTHS, WAS THERE A TIME WHEN YOU DID NOT HAVE A STEADY PLACE TO SLEEP OR SLEPT IN A SHELTER (INCLUDING NOW)?: NO

## 2023-07-25 ASSESSMENT — PATIENT HEALTH QUESTIONNAIRE - PHQ9
SUM OF ALL RESPONSES TO PHQ QUESTIONS 1-9: 0
2. FEELING DOWN, DEPRESSED OR HOPELESS: 0
1. LITTLE INTEREST OR PLEASURE IN DOING THINGS: 0
SUM OF ALL RESPONSES TO PHQ QUESTIONS 1-9: 0
SUM OF ALL RESPONSES TO PHQ9 QUESTIONS 1 & 2: 0
SUM OF ALL RESPONSES TO PHQ QUESTIONS 1-9: 0
SUM OF ALL RESPONSES TO PHQ QUESTIONS 1-9: 0

## 2023-07-25 ASSESSMENT — ENCOUNTER SYMPTOMS
BLOOD IN STOOL: 0
CONSTIPATION: 0
SHORTNESS OF BREATH: 0
COUGH: 0
NAUSEA: 0
ABDOMINAL PAIN: 0
DIARRHEA: 0
VOMITING: 0

## 2023-07-25 ASSESSMENT — LIFESTYLE VARIABLES
HOW MANY STANDARD DRINKS CONTAINING ALCOHOL DO YOU HAVE ON A TYPICAL DAY: 3 OR 4
HOW OFTEN DO YOU HAVE A DRINK CONTAINING ALCOHOL: 2-4 TIMES A MONTH

## 2023-07-25 NOTE — ACP (ADVANCE CARE PLANNING)
Advance Care Planning   Advance Care Planning (ACP) Physician/NP/PA (Provider) Conversation    Date of ACP Conversation: 07/25/23  Persons included in Conversation:  patient  Length of ACP Conversation in minutes: 16 minutes    Has ACP document(s) NOT on file - requested patient to provide.   She confirms current DNR status - completed forms on file (place new order if needed)    The patient has appointed the following active healthcare agents:    Primary Decision Maker: Maddison Pacheco - [phone]     The Patient has the following current code status:    Code Status: DNR    Kat Cabrera MD

## 2023-07-26 LAB
ALBUMIN SERPL-MCNC: 4.1 G/DL (ref 3.5–5)
ALBUMIN/GLOB SERPL: 1.2 (ref 1.1–2.2)
ALP SERPL-CCNC: 69 U/L (ref 45–117)
ALT SERPL-CCNC: 24 U/L (ref 12–78)
ANION GAP SERPL CALC-SCNC: 1 MMOL/L (ref 5–15)
AST SERPL-CCNC: 20 U/L (ref 15–37)
BILIRUB SERPL-MCNC: 0.5 MG/DL (ref 0.2–1)
BUN SERPL-MCNC: 14 MG/DL (ref 6–20)
BUN/CREAT SERPL: 18 (ref 12–20)
CALCIUM SERPL-MCNC: 9.6 MG/DL (ref 8.5–10.1)
CHLORIDE SERPL-SCNC: 105 MMOL/L (ref 97–108)
CO2 SERPL-SCNC: 31 MMOL/L (ref 21–32)
CREAT SERPL-MCNC: 0.77 MG/DL (ref 0.55–1.02)
ERYTHROCYTE [DISTWIDTH] IN BLOOD BY AUTOMATED COUNT: 12.5 % (ref 11.5–14.5)
EST. AVERAGE GLUCOSE BLD GHB EST-MCNC: 120 MG/DL
GLOBULIN SER CALC-MCNC: 3.3 G/DL (ref 2–4)
GLUCOSE SERPL-MCNC: 102 MG/DL (ref 65–100)
HBA1C MFR BLD: 5.8 % (ref 4–5.6)
HCT VFR BLD AUTO: 43.3 % (ref 35–47)
HGB BLD-MCNC: 13.6 G/DL (ref 11.5–16)
MCH RBC QN AUTO: 31 PG (ref 26–34)
MCHC RBC AUTO-ENTMCNC: 31.4 G/DL (ref 30–36.5)
MCV RBC AUTO: 98.6 FL (ref 80–99)
NRBC # BLD: 0 K/UL (ref 0–0.01)
NRBC BLD-RTO: 0 PER 100 WBC
PLATELET # BLD AUTO: 354 K/UL (ref 150–400)
PMV BLD AUTO: 10 FL (ref 8.9–12.9)
POTASSIUM SERPL-SCNC: 4.6 MMOL/L (ref 3.5–5.1)
PROT SERPL-MCNC: 7.4 G/DL (ref 6.4–8.2)
RBC # BLD AUTO: 4.39 M/UL (ref 3.8–5.2)
SODIUM SERPL-SCNC: 137 MMOL/L (ref 136–145)
WBC # BLD AUTO: 6.3 K/UL (ref 3.6–11)

## 2023-07-26 NOTE — RESULT ENCOUNTER NOTE
Results released to patient via Location. All labs are stable or at goal for her.    A1c improved, FBS just abnormal.

## 2023-08-30 ENCOUNTER — HOSPITAL ENCOUNTER (OUTPATIENT)
Facility: HOSPITAL | Age: 88
Setting detail: RECURRING SERIES
Discharge: HOME | End: 2023-09-02
Payer: MEDICARE

## 2023-08-30 PROCEDURE — 97110 THERAPEUTIC EXERCISES: CPT

## 2023-08-30 PROCEDURE — 97161 PT EVAL LOW COMPLEX 20 MIN: CPT

## 2023-08-30 NOTE — THERAPY EVALUATION
Physical Therapy at [facility],   a part of [address] [place] [address] Melissa Smith S Loreto Valle  [id number] GRZEGORZ:[phone]                                                                            PHYSICAL THERAPY - MEDICARE EVALUATION/PLAN OF CARE NOTE (updated 3/23)      Date: 2023          Patient Name:  Alisa Wilson :  1935   Medical   Diagnosis:  Gait instability [R26.81] Treatment Diagnosis:  R26.89   Abnormalities of gait and mobility    Referral Source:  Keya Velazco, * Provider #:  [id number]                  Insurance: Payor: MEDICARE / Plan: MEDICARE PART A AND B / Product Type: *No Product type* /      Patient  verified yes     Visit #   Current  / Total 1 24   Time   In / Out 230 P 3:10 P   Total Treatment Time 40   Total Timed Codes 10   1:1 Treatment Time 10      [facility] Totals Reminder:  bill using total billable   min of TIMED therapeutic procedures and modalities. 8-22 min = 1 unit; 23-37 min = 2 units; 38-52 min = 3 units;  53-67 min = 4 units; 68-82 min = 5 units           SUBJECTIVE  Pain Level (0-10 scale): 0    Any medication changes, allergies to medications, adverse drug reactions, diagnosis change, or new procedure performed?: [x] No    [] Yes (see summary sheet for update)  Medications: Verified on Patient Summary List    Subjective functional status/changes; current symptoms/Complaints and date of onset:   Pt presents with referral for gait instability, polyneuropathy. She reports that she does feel some unsteadiness while walking around during the day; she will bump into furniture or the wall occasionally. No falls. \"Dr Yuri Barker thinks I have a problem with my gait\". She goes to the [facility] 3 days/wk-- does strength machines, rowing machine, rides stationary bike. She is still doing several of the exercises for her R hip that she learned in PT a few months ago.     Start of Care: 2023  Mechanism of Injury:

## 2023-09-06 ENCOUNTER — APPOINTMENT (OUTPATIENT)
Facility: HOSPITAL | Age: 88
End: 2023-09-06
Payer: MEDICARE

## 2023-09-08 ENCOUNTER — HOSPITAL ENCOUNTER (OUTPATIENT)
Facility: HOSPITAL | Age: 88
Setting detail: RECURRING SERIES
Discharge: HOME | End: 2023-09-11
Payer: MEDICARE

## 2023-09-08 PROCEDURE — 97112 NEUROMUSCULAR REEDUCATION: CPT

## 2023-09-08 NOTE — PROGRESS NOTES
PHYSICAL THERAPY - MEDICARE DAILY TREATMENT NOTE (updated 3/23)      Date: 2023          Patient Name:  Lachelle Lawrence :  1935   Medical   Diagnosis:  Gait instability [R26.81] Treatment Diagnosis:  R26.89   Abnormalities of gait and mobility    Referral Source:  Alison Miguel, * Insurance:   Payor: MEDICARE / Plan: MEDICARE PART A AND B / Product Type: *No Product type* /                     Patient  verified yes     Visit #   Current  / Total 2 24   Time   In / Out 1130 A 12:05 P   Total Treatment Time 35   Total Timed Codes 35   1:1 Treatment Time 30      [facility] Totals Reminder:  bill using total billable   min of TIMED therapeutic procedures and modalities. 8-22 min = 1 unit; 23-37 min = 2 units; 38-52 min = 3 units; 53-67 min = 4 units; 68-82 min = 5 units            SUBJECTIVE    Pain Level (0-10 scale): 0    Any medication changes, allergies to medications, adverse drug reactions, diagnosis change, or new procedure performed?: [x] No    [] Yes (see summary sheet for update)  Medications: Verified on Patient Summary List    Subjective functional status/changes:     Good compliance with HEP    OBJECTIVE      Therapeutic Procedures: Tx Min Billable or 1:1 Min (if diff from Tx Min) Procedure, Rationale, Specifics   10 5 92586 Therapeutic Exercise (timed):  increase ROM, strength, coordination, balance, and proprioception to improve patient's ability to progress to PLOF and address remaining functional goals. (see flow sheet as applicable)     Details if applicable:     25 25 62124 Neuromuscular Re-Education (timed):  improve balance, coordination, kinesthetic sense, posture, core stability and proprioception to improve patient's ability to develop conscious control of individual muscles and awareness of position of extremities in order to progress to PLOF and address remaining functional goals.  (see flow sheet as applicable)     Details if applicable:     35 30    Total Total

## 2023-09-11 ENCOUNTER — HOSPITAL ENCOUNTER (OUTPATIENT)
Facility: HOSPITAL | Age: 88
Setting detail: RECURRING SERIES
Discharge: HOME | End: 2023-09-14
Payer: MEDICARE

## 2023-09-11 PROCEDURE — 97112 NEUROMUSCULAR REEDUCATION: CPT

## 2023-09-11 NOTE — PROGRESS NOTES
PHYSICAL THERAPY - MEDICARE DAILY TREATMENT NOTE (updated 3/23)      Date: 2023          Patient Name:  Thelma Peralta :  1935   Medical   Diagnosis:  Gait instability [R26.81] Treatment Diagnosis:  R26.89   Abnormalities of gait and mobility    Referral Source:  Pepe Miguel, * Insurance:   Payor: MEDICARE / Plan: MEDICARE PART A AND B / Product Type: *No Product type* /                     Patient  verified yes     Visit #   Current  / Total 3 24   Time   In / Out 1235 P 1:10 P   Total Treatment Time 35   Total Timed Codes 35   1:1 Treatment Time 30      [facility] Totals Reminder:  bill using total billable   min of TIMED therapeutic procedures and modalities. 8-22 min = 1 unit; 23-37 min = 2 units; 38-52 min = 3 units; 53-67 min = 4 units; 68-82 min = 5 units            SUBJECTIVE    Pain Level (0-10 scale): 0    Any medication changes, allergies to medications, adverse drug reactions, diagnosis change, or new procedure performed?: [x] No    [] Yes (see summary sheet for update)  Medications: Verified on Patient Summary List    Subjective functional status/changes:     Inc'd hip soreness after last PT visit. Planning to go to gym after PT today    OBJECTIVE      Therapeutic Procedures: Tx Min Billable or 1:1 Min (if diff from Tx Min) Procedure, Rationale, Specifics   10 5 33297 Therapeutic Exercise (timed):  increase ROM, strength, coordination, balance, and proprioception to improve patient's ability to progress to PLOF and address remaining functional goals. (see flow sheet as applicable)     Details if applicable:     25 00 26705 Neuromuscular Re-Education (timed):  improve balance, coordination, kinesthetic sense, posture, core stability and proprioception to improve patient's ability to develop conscious control of individual muscles and awareness of position of extremities in order to progress to PLOF and address remaining functional goals.  (see flow sheet as applicable)

## 2023-09-14 ENCOUNTER — HOSPITAL ENCOUNTER (OUTPATIENT)
Facility: HOSPITAL | Age: 88
Setting detail: RECURRING SERIES
Discharge: HOME | End: 2023-09-17
Payer: MEDICARE

## 2023-09-14 PROCEDURE — 97112 NEUROMUSCULAR REEDUCATION: CPT

## 2023-09-14 NOTE — PROGRESS NOTES
PHYSICAL THERAPY - MEDICARE DAILY TREATMENT NOTE (updated 3/23)      Date: 2023          Patient Name:  Figueroa Suarez :  1935   Medical   Diagnosis:  Gait instability [R26.81] Treatment Diagnosis:  R26.89   Abnormalities of gait and mobility    Referral Source:  Joana Alxe, * Insurance:   Payor: MEDICARE / Plan: MEDICARE PART A AND B / Product Type: *No Product type* /                     Patient  verified yes     Visit #   Current  / Total 4 24   Time   In / Out 1:30 P 2:00 P   Total Treatment Time 30   Total Timed Codes 30   1:1 Treatment Time 30      [facility] Totals Reminder:  bill using total billable   min of TIMED therapeutic procedures and modalities. 8-22 min = 1 unit; 23-37 min = 2 units; 38-52 min = 3 units; 53-67 min = 4 units; 68-82 min = 5 units            SUBJECTIVE    Pain Level (0-10 scale): 0    Any medication changes, allergies to medications, adverse drug reactions, diagnosis change, or new procedure performed?: [x] No    [] Yes (see summary sheet for update)  Medications: Verified on Patient Summary List    Subjective functional status/changes:     Pt doing well today. No new complaints    OBJECTIVE      Therapeutic Procedures: Tx Min Billable or 1:1 Min (if diff from Tx Min) Procedure, Rationale, Specifics   5  95264 Therapeutic Exercise (timed):  increase ROM, strength, coordination, balance, and proprioception to improve patient's ability to progress to PLOF and address remaining functional goals. (see flow sheet as applicable)     Details if applicable:     25  17699 Neuromuscular Re-Education (timed):  improve balance, coordination, kinesthetic sense, posture, core stability and proprioception to improve patient's ability to develop conscious control of individual muscles and awareness of position of extremities in order to progress to PLOF and address remaining functional goals.  (see flow sheet as applicable)     Details if applicable:     30     Total

## 2023-09-19 ENCOUNTER — APPOINTMENT (OUTPATIENT)
Facility: HOSPITAL | Age: 88
End: 2023-09-19
Payer: MEDICARE

## 2023-09-21 ENCOUNTER — HOSPITAL ENCOUNTER (OUTPATIENT)
Facility: HOSPITAL | Age: 88
Setting detail: RECURRING SERIES
Discharge: HOME | End: 2023-09-24
Payer: MEDICARE

## 2023-09-21 PROCEDURE — 97112 NEUROMUSCULAR REEDUCATION: CPT

## 2023-09-21 NOTE — PROGRESS NOTES
PHYSICAL THERAPY - MEDICARE DAILY TREATMENT NOTE (updated 3/23)      Date: 2023          Patient Name:  Lilo Medina :  1935   Medical   Diagnosis:  Gait instability [R26.81] Treatment Diagnosis:  R26.89   Abnormalities of gait and mobility    Referral Source:  Kun Fritz, * Insurance:   Payor: MEDICARE / Plan: MEDICARE PART A AND B / Product Type: *No Product type* /                     Patient  verified yes     Visit #   Current  / Total 5 24   Time   In / Out 1:30 P 2:00 P   Total Treatment Time 30   Total Timed Codes 30   1:1 Treatment Time 30      [facility] Totals Reminder:  bill using total billable   min of TIMED therapeutic procedures and modalities. 8-22 min = 1 unit; 23-37 min = 2 units; 38-52 min = 3 units; 53-67 min = 4 units; 68-82 min = 5 units            SUBJECTIVE    Pain Level (0-10 scale): 0    Any medication changes, allergies to medications, adverse drug reactions, diagnosis change, or new procedure performed?: [x] No    [] Yes (see summary sheet for update)  Medications: Verified on Patient Summary List    Subjective functional status/changes:     Pt doing well; she would like to be discharged next visit as she is leaving town next [employer]    OBJECTIVE      Therapeutic Procedures: Tx Min Billable or 1:1 Min (if diff from Tx Min) Procedure, Rationale, Specifics   5  11374 Therapeutic Exercise (timed):  increase ROM, strength, coordination, balance, and proprioception to improve patient's ability to progress to PLOF and address remaining functional goals. (see flow sheet as applicable)     Details if applicable:     13 67809 Neuromuscular Re-Education (timed):  improve balance, coordination, kinesthetic sense, posture, core stability and proprioception to improve patient's ability to develop conscious control of individual muscles and awareness of position of extremities in order to progress to PLOF and address remaining functional goals.  (see flow sheet as

## 2023-09-26 ENCOUNTER — HOSPITAL ENCOUNTER (OUTPATIENT)
Facility: HOSPITAL | Age: 88
Setting detail: RECURRING SERIES
Discharge: HOME | End: 2023-09-29
Payer: MEDICARE

## 2023-09-26 PROCEDURE — 97110 THERAPEUTIC EXERCISES: CPT

## 2023-09-26 PROCEDURE — 97112 NEUROMUSCULAR REEDUCATION: CPT

## 2023-09-26 NOTE — PROGRESS NOTES
PHYSICAL THERAPY - MEDICARE DAILY TREATMENT/Progress and Discharge NOTE (updated 3/23)      Date: 2023          Patient Name:  Jose Zamudio :  1935   Medical   Diagnosis:  Gait instability [R26.81] Treatment Diagnosis:  R26.89   Abnormalities of gait and mobility    Referral Source:  Fuentes Genao, * Insurance:   Payor: MEDICARE / Plan: MEDICARE PART A AND B / Product Type: *No Product type* /                     Patient  verified yes     Visit #   Current  / Total 6 24   Time   In / Out 1:20 P 1:45 P   Total Treatment Time 25   Total Timed Codes 25   1:1 Treatment Time 25      [facility] Totals Reminder:  bill using total billable   min of TIMED therapeutic procedures and modalities. 8-22 min = 1 unit; 23-37 min = 2 units; 38-52 min = 3 units; 53-67 min = 4 units; 68-82 min = 5 units            SUBJECTIVE    Pain Level (0-10 scale): 0    Any medication changes, allergies to medications, adverse drug reactions, diagnosis change, or new procedure performed?: [x] No    [] Yes (see summary sheet for update)  Medications: Verified on Patient Summary List    Subjective functional status/changes:     \"I feel better since coming here\". She would like to be discharged today as she is very compliant with HEP    OBJECTIVE      Therapeutic Procedures: Tx Min Billable or 1:1 Min (if diff from Tx Min) Procedure, Rationale, Specifics   15  74947 Therapeutic Exercise (timed):  increase ROM, strength, coordination, balance, and proprioception to improve patient's ability to progress to PLOF and address remaining functional goals.  (see flow sheet as applicable)     Details if applicable:  objective measurements for progress note   10  84069 Neuromuscular Re-Education (timed):  improve balance, coordination, kinesthetic sense, posture, core stability and proprioception to improve patient's ability to develop conscious control of individual muscles and awareness of position of extremities in order to

## 2023-09-28 ENCOUNTER — APPOINTMENT (OUTPATIENT)
Facility: HOSPITAL | Age: 88
End: 2023-09-28
Payer: MEDICARE

## 2023-10-06 RX ORDER — AMLODIPINE BESYLATE 5 MG/1
TABLET ORAL
Qty: 90 TABLET | Refills: 2 | Status: SHIPPED | OUTPATIENT
Start: 2023-10-06

## 2023-10-07 NOTE — TELEPHONE ENCOUNTER
Future Appointments   Date Time Provider 4600  46[facility]   7/29/2024 10:20 AM Phillip Perez MD [facility] ANDREA COKER AMB

## 2023-10-25 ENCOUNTER — OFFICE VISIT (OUTPATIENT)
Age: 88
End: 2023-10-25
Payer: MEDICARE

## 2023-10-25 VITALS
SYSTOLIC BLOOD PRESSURE: 125 MMHG | RESPIRATION RATE: 15 BRPM | BODY MASS INDEX: 25.88 KG/M2 | HEART RATE: 61 BPM | WEIGHT: 161 LBS | DIASTOLIC BLOOD PRESSURE: 67 MMHG | TEMPERATURE: 97.8 F | HEIGHT: 66 IN | OXYGEN SATURATION: 95 %

## 2023-10-25 DIAGNOSIS — L24.7 IRRITANT CONTACT DERMATITIS DUE TO PLANTS, EXCEPT FOOD: Primary | ICD-10-CM

## 2023-10-25 PROCEDURE — 99213 OFFICE O/P EST LOW 20 MIN: CPT | Performed by: INTERNAL MEDICINE

## 2023-10-25 PROCEDURE — G8419 CALC BMI OUT NRM PARAM NOF/U: HCPCS | Performed by: INTERNAL MEDICINE

## 2023-10-25 PROCEDURE — G8427 DOCREV CUR MEDS BY ELIG CLIN: HCPCS | Performed by: INTERNAL MEDICINE

## 2023-10-25 PROCEDURE — G8484 FLU IMMUNIZE NO ADMIN: HCPCS | Performed by: INTERNAL MEDICINE

## 2023-10-25 PROCEDURE — 1036F TOBACCO NON-USER: CPT | Performed by: INTERNAL MEDICINE

## 2023-10-25 PROCEDURE — 1090F PRES/ABSN URINE INCON ASSESS: CPT | Performed by: INTERNAL MEDICINE

## 2023-10-25 PROCEDURE — 1123F ACP DISCUSS/DSCN MKR DOCD: CPT | Performed by: INTERNAL MEDICINE

## 2023-10-25 RX ORDER — TRIAMCINOLONE ACETONIDE 1 MG/G
CREAM TOPICAL
Qty: 80 G | Refills: 0 | Status: SHIPPED | OUTPATIENT
Start: 2023-10-25

## 2023-10-25 NOTE — PROGRESS NOTES
HPI:  Jose Zamudio is a 82y.o. year old female who is here for a 2-day history of a rash on her forearms as well as the right upper face. It started after she had done work in her yard. The areas on her arms itch in the area on her face is burning. She has been using over-the-counter agents with limited success. No fevers or chills. No sweats. No nausea or vomiting. No change in bowel or bladder habits. 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 HYSTERECTOMY, TOTAL ABDOMINAL (CERVIX REMOVED)  2002    FELICE/BSO, dilation and curratagex2    PORTACATH PLACEMENT  2002    INSERTION / [facility]       Prior to Admission medications    Medication Sig Start Date End Date Taking? Authorizing Provider   triamcinolone (KENALOG) 0.1 % cream Apply topically 2 times daily.  10/25/23  Yes Lesvia Luna MD   amLODIPine (NORVASC) 5 MG tablet TAKE 1 TABLET DAILY 10/6/23  Yes Nahomi Chance MD   calcium carbonate (OSCAL) 500 MG TABS tablet Take 1 tablet by mouth daily   Yes Provider, MD Sukhjinder   bimatoprost (LUMIGAN) 0.01 % SOLN ophthalmic drops Place into both eyes nightly ceived the following from [facility]: Outside name: LUMIGAN 0.01 % ophthalmic drops 2/8/18  Yes Automatic Reconciliation, Ar   Biotin 2.5 MG CAPS Take by mouth daily   Yes Automatic Reconciliation, Ar   vitamin D (CHOLECALCIFEROL) 25 MCG (1000 UT) TABS tablet Take by mouth daily   Yes Automatic Reconciliation, Ar   estradiol (ESTRACE) 0.1 MG/GM

## 2023-10-26 ENCOUNTER — TELEPHONE (OUTPATIENT)
Age: 88
End: 2023-10-26

## 2023-10-26 RX ORDER — PREDNISONE 10 MG/1
TABLET ORAL
Qty: 20 TABLET | Refills: 0 | Status: SHIPPED | OUTPATIENT
Start: 2023-10-26

## 2023-10-26 NOTE — TELEPHONE ENCOUNTER
Orders Placed This Encounter    predniSONE (DELTASONE) 10 MG tablet     Si mg x 2 days, 30 mg x 2 days, 20 mg x 2 days, 10 mg x 2 days. Dispense:  20 tablet     Refill:  0       The above orders were approved via [facility] per Dr. Robbie Almaraz, III.

## 2023-10-26 NOTE — TELEPHONE ENCOUNTER
Reason for call:  pt saw Adria Meier on 10/25/23 and was told to check with her eye doctor, Dr Esther William see if it was OK for her to take prednisone. Pt states that it is fine to take the prednisone for short term dose.     Is this a new problem: Yes    Date of last appointment:  10/25/2023     Can we respond via Latindat: No    Best call back number:    Jose Zamudio (Self) [phone] [facility]

## 2023-10-30 DIAGNOSIS — Z12.31 ENCOUNTER FOR SCREENING MAMMOGRAM FOR MALIGNANT NEOPLASM OF BREAST: ICD-10-CM

## 2023-12-13 ENCOUNTER — TELEPHONE (OUTPATIENT)
Age: 88
End: 2023-12-13

## 2023-12-13 NOTE — TELEPHONE ENCOUNTER
Patient is having some Neropathy concerns that she would like to discuss and possibly come in for an appointment asap.     Please contact

## 2023-12-15 NOTE — TELEPHONE ENCOUNTER
Call placed to patient. 2 identifiers received. Patient stated she feels her neuropathy is worsening. Complaining changes in the appearance of her feet. States that she has and area under her toes that are black and blue. Patient wanted to know if a neuropathy clinic would be prudent in her situation. Advised patient that she might need to reach out to her PCP or possibly a vascular doctor. Advised patient I would route the information to Dr. Leslie Haines and would call her back. Patient indicated understanding.

## 2024-01-23 ENCOUNTER — OFFICE VISIT (OUTPATIENT)
Age: 89
End: 2024-01-23
Payer: MEDICARE

## 2024-01-23 VITALS
DIASTOLIC BLOOD PRESSURE: 78 MMHG | WEIGHT: 166.8 LBS | TEMPERATURE: 97.5 F | HEIGHT: 65 IN | SYSTOLIC BLOOD PRESSURE: 154 MMHG | RESPIRATION RATE: 16 BRPM | BODY MASS INDEX: 27.79 KG/M2 | OXYGEN SATURATION: 97 % | HEART RATE: 73 BPM

## 2024-01-23 DIAGNOSIS — I87.8 VENOUS STASIS: Primary | ICD-10-CM

## 2024-01-23 PROCEDURE — G8427 DOCREV CUR MEDS BY ELIG CLIN: HCPCS | Performed by: INTERNAL MEDICINE

## 2024-01-23 PROCEDURE — G8419 CALC BMI OUT NRM PARAM NOF/U: HCPCS | Performed by: INTERNAL MEDICINE

## 2024-01-23 PROCEDURE — 1036F TOBACCO NON-USER: CPT | Performed by: INTERNAL MEDICINE

## 2024-01-23 PROCEDURE — 1090F PRES/ABSN URINE INCON ASSESS: CPT | Performed by: INTERNAL MEDICINE

## 2024-01-23 PROCEDURE — G8484 FLU IMMUNIZE NO ADMIN: HCPCS | Performed by: INTERNAL MEDICINE

## 2024-01-23 PROCEDURE — 1123F ACP DISCUSS/DSCN MKR DOCD: CPT | Performed by: INTERNAL MEDICINE

## 2024-01-23 PROCEDURE — 99213 OFFICE O/P EST LOW 20 MIN: CPT | Performed by: INTERNAL MEDICINE

## 2024-01-23 ASSESSMENT — PATIENT HEALTH QUESTIONNAIRE - PHQ9
SUM OF ALL RESPONSES TO PHQ9 QUESTIONS 1 & 2: 0
SUM OF ALL RESPONSES TO PHQ QUESTIONS 1-9: 0
1. LITTLE INTEREST OR PLEASURE IN DOING THINGS: 0
2. FEELING DOWN, DEPRESSED OR HOPELESS: 0
SUM OF ALL RESPONSES TO PHQ QUESTIONS 1-9: 0

## 2024-01-23 NOTE — PROGRESS NOTES
Lorena Perez is a 88 y.o. female who was seen in clinic today (1/23/2024) for an acute visit.      Assessment & Plan:   Below is the assessment and plan developed based on review of pertinent history, physical exam, labs, studies, and medications.    1. Venous stasis     this is a chronic problem but new to me.  Differential dx reviewed with the patient and at this time favor venous stasis as the cause of the discoloration.  Nothing to suggest stenosis or neuropathy.  Recommended to treat with elevation and compression socks.  Red flags were reviewed with the patient to RTC or notify me.  Expected time course for resolution reviewed.  All her questions were answered.  She will take pictures next time discoloration occurs and let me know.  .     On this date 1/23/2024 I have spent 23 minutes reviewing previous notes, test results and face to face with the patient discussing the diagnosis and importance of compliance with the treatment plan as well as documenting on the day of the visit.      Return if symptoms worsen or fail to improve.   Subjective/Objective:   [place] was seen today for Peripheral Neuropathy    She reports over the holidays noticing increase in prominence of the veins in her feet and some discoloration.  Some areas were turning blue.  She called her neurologist who recommended to see me.    She denies any claudication symptoms.  Discoloration occurs is on the top of the foot and base of the toes.  She can't recall how long it lasts or how often it is happening. It is occurring on days she doesn't go to the gym but no other obvious correlations.  No swelling.      Prior to Admission medications    Medication Sig Start Date End Date Taking? Authorizing Provider   amLODIPine (NORVASC) 5 MG tablet TAKE 1 TABLET DAILY 10/6/23  Yes Jarrell Caicedo MD   calcium carbonate (OSCAL) 500 MG TABS tablet Take 1 tablet by mouth daily   Yes Provider, MD Sukhjinder   bimatoprost (LUMIGAN) 0.01 % SOLN

## 2024-05-01 NOTE — TELEPHONE ENCOUNTER
Chief Complaint   Patient presents with    Medication Refill     Last Appointment with Dr. Jarrell Caicedo: 1/23/24    Future Appointments   Date Time Provider Department Center   7/29/2024 10:20 AM Jarrell Caicedo MD [facility]

## 2024-05-02 ENCOUNTER — TELEPHONE (OUTPATIENT)
Age: 89
End: 2024-05-02

## 2024-05-02 RX ORDER — ESTRADIOL 0.1 MG/G
CREAM VAGINAL
Qty: 42.5 G | Refills: 4 | OUTPATIENT
Start: 2024-05-02

## 2024-05-02 NOTE — TELEPHONE ENCOUNTER
I have never prescribed this for her.  Needs to get from specialist.    Future Appointments   Date Time Provider Department Center   7/29/2024 10:20 AM Jarrell Caicedo MD [facility]

## 2024-05-02 NOTE — TELEPHONE ENCOUNTER
Patient's refill request for Estrandol was refused by [facility], since he would like her to this filled by a specialist.  Patient said that her Urologist is no longer with the practice and would like to know if Dr. Caicedo would consider prescribing this for her.    Lorena Perez - [phone]

## 2024-05-03 NOTE — TELEPHONE ENCOUNTER
I can prescribe it and we can talk more about it at her f/u in July.  How often is she using it?  How big is the tube she is getting?

## 2024-05-06 ENCOUNTER — TELEPHONE (OUTPATIENT)
Age: 89
End: 2024-05-06

## 2024-05-06 RX ORDER — ESTRADIOL 0.1 MG/G
CREAM VAGINAL
Qty: 42.5 G | Refills: 0 | Status: SHIPPED | OUTPATIENT
Start: 2024-05-06

## 2024-05-06 NOTE — TELEPHONE ENCOUNTER
Future Appointments   Date Time Provider Department Center   7/29/2024 10:20 AM Jarrell Caicedo MD [facility]

## 2024-05-06 NOTE — TELEPHONE ENCOUNTER
Medication Refill Request    Lorena Perez is requesting a refill of the following medication(s):     Estradiol 0.1mg   apply 1/2g vaginally 3 times per week, 42.5 g tube    Please send refill to:   [facility] - [place], [place] - [address] [place] [address] [phone] - F [phone]  [address] [place] [id number]  Phone: [phone] Fax: [phone]

## 2024-05-10 RX ORDER — ESTRADIOL 0.1 MG/G
CREAM VAGINAL
Qty: 42.5 G | Refills: 4 | OUTPATIENT
Start: 2024-05-10

## 2024-05-13 RX ORDER — ESTRADIOL 0.1 MG/G
CREAM VAGINAL
Qty: 42.5 G | Refills: 4 | OUTPATIENT
Start: 2024-05-13

## 2024-05-13 RX ORDER — ESTRADIOL 0.1 MG/G
CREAM VAGINAL
Qty: 42.5 G | Refills: 0 | Status: SHIPPED | OUTPATIENT
Start: 2024-05-13

## 2024-07-02 NOTE — TELEPHONE ENCOUNTER
Chief Complaint   Patient presents with    Medication Refill     Last Appointment with Dr. Caicedo:  1/23/2024   Future Appointments   Date Time Provider Department Center   7/29/2024 10:20 AM Jarrell Caicedo MD [facility]

## 2024-07-03 RX ORDER — AMLODIPINE BESYLATE 5 MG/1
TABLET ORAL
Qty: 90 TABLET | Refills: 3 | Status: SHIPPED | OUTPATIENT
Start: 2024-07-03

## 2024-07-29 ENCOUNTER — OFFICE VISIT (OUTPATIENT)
Age: 89
End: 2024-07-29
Payer: MEDICARE

## 2024-07-29 VITALS
HEART RATE: 56 BPM | TEMPERATURE: 97.7 F | BODY MASS INDEX: 27.49 KG/M2 | RESPIRATION RATE: 16 BRPM | OXYGEN SATURATION: 95 % | HEIGHT: 65 IN | DIASTOLIC BLOOD PRESSURE: 76 MMHG | WEIGHT: 165 LBS | SYSTOLIC BLOOD PRESSURE: 152 MMHG

## 2024-07-29 DIAGNOSIS — Z00.00 MEDICARE ANNUAL WELLNESS VISIT, SUBSEQUENT: Primary | ICD-10-CM

## 2024-07-29 DIAGNOSIS — M81.6 LOCALIZED OSTEOPOROSIS WITHOUT CURRENT PATHOLOGICAL FRACTURE: ICD-10-CM

## 2024-07-29 DIAGNOSIS — R73.02 GLUCOSE INTOLERANCE (IMPAIRED GLUCOSE TOLERANCE): ICD-10-CM

## 2024-07-29 DIAGNOSIS — Z71.89 ADVANCED CARE PLANNING/COUNSELING DISCUSSION: ICD-10-CM

## 2024-07-29 DIAGNOSIS — I49.1 PAC (PREMATURE ATRIAL CONTRACTION): ICD-10-CM

## 2024-07-29 DIAGNOSIS — R25.1 TREMOR: ICD-10-CM

## 2024-07-29 DIAGNOSIS — I10 WHITE COAT SYNDROME WITH HYPERTENSION: ICD-10-CM

## 2024-07-29 PROBLEM — G20.A1 PARKINSON'S DISEASE WITHOUT DYSKINESIA OR FLUCTUATING MANIFESTATIONS (HCC): Status: RESOLVED | Noted: 2024-07-29 | Resolved: 2024-07-29

## 2024-07-29 PROBLEM — G20.A1 PARKINSON'S DISEASE WITHOUT DYSKINESIA OR FLUCTUATING MANIFESTATIONS (HCC): Status: ACTIVE | Noted: 2024-07-29

## 2024-07-29 PROCEDURE — 1123F ACP DISCUSS/DSCN MKR DOCD: CPT | Performed by: INTERNAL MEDICINE

## 2024-07-29 PROCEDURE — G0439 PPPS, SUBSEQ VISIT: HCPCS | Performed by: INTERNAL MEDICINE

## 2024-07-29 SDOH — ECONOMIC STABILITY: FOOD INSECURITY: WITHIN THE PAST 12 MONTHS, THE FOOD YOU BOUGHT JUST DIDN'T LAST AND YOU DIDN'T HAVE MONEY TO GET MORE.: NEVER TRUE

## 2024-07-29 SDOH — ECONOMIC STABILITY: INCOME INSECURITY: HOW HARD IS IT FOR YOU TO PAY FOR THE VERY BASICS LIKE FOOD, HOUSING, MEDICAL CARE, AND HEATING?: NOT HARD AT ALL

## 2024-07-29 SDOH — ECONOMIC STABILITY: FOOD INSECURITY: WITHIN THE PAST 12 MONTHS, YOU WORRIED THAT YOUR FOOD WOULD RUN OUT BEFORE YOU GOT MONEY TO BUY MORE.: NEVER TRUE

## 2024-07-29 ASSESSMENT — PATIENT HEALTH QUESTIONNAIRE - PHQ9
1. LITTLE INTEREST OR PLEASURE IN DOING THINGS: NOT AT ALL
SUM OF ALL RESPONSES TO PHQ QUESTIONS 1-9: 0
2. FEELING DOWN, DEPRESSED OR HOPELESS: NOT AT ALL
SUM OF ALL RESPONSES TO PHQ9 QUESTIONS 1 & 2: 0
SUM OF ALL RESPONSES TO PHQ QUESTIONS 1-9: 0

## 2024-07-29 ASSESSMENT — ENCOUNTER SYMPTOMS
DIARRHEA: 0
BACK PAIN: 0
ABDOMINAL PAIN: 0
SHORTNESS OF BREATH: 0
BLOOD IN STOOL: 0
CONSTIPATION: 0
VOMITING: 0
NAUSEA: 0
COUGH: 0

## 2024-07-29 ASSESSMENT — LIFESTYLE VARIABLES
HOW MANY STANDARD DRINKS CONTAINING ALCOHOL DO YOU HAVE ON A TYPICAL DAY: 1 OR 2
HOW OFTEN DO YOU HAVE A DRINK CONTAINING ALCOHOL: 2-3 TIMES A WEEK

## 2024-07-29 NOTE — ASSESSMENT & PLAN NOTE
Asymptomatic, reviewed A1c vs FBS, will check labs, continue to work on diet    LABS:                          5.9    18.37 )-----------( 134      ( 10 Nov 2019 12:30 )             19.6     11-10    133<L>  |  80<L>  |  31<H>  ----------------------------<  142<H>  2.3<LL>   |  24  |  1.9<H>    Ca    8.8      10 Nov 2019 12:30  Mg     2.3     11-10    TPro  6.2  /  Alb  3.1<L>  /  TBili  0.4  /  DBili  x   /  AST  37  /  ALT  22  /  AlkPhos  203<H>  11-10            PT/INR - ( 10 Nov 2019 14:50 )   PT: 36.00 sec;   INR: 3.17 ratio         PTT - ( 10 Nov 2019 14:50 )  PTT:31.7 sec  Lactate Trend    CARDIAC MARKERS ( 10 Nov 2019 12:30 )  x     / <0.01 ng/mL / x     / x     / x          CAPILLARY BLOOD GLUCOSE            RADIOLOGY:    < from: Xray Chest 1 View-PORTABLE IMMEDIATE (11.10.19 @ 14:57) >  Impression:    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 Superimposed   consolidation cannot be excluded.  < end of copied text >      EKGS:

## 2024-07-29 NOTE — PROGRESS NOTES
Medicare Annual Wellness Visit    Lorena Perez is here for Medicare AWV    Assessment & Plan   Medicare annual wellness visit, subsequent  Advanced care planning/counseling discussion  Localized osteoporosis without current pathological fracture  Assessment & Plan:  Asymptomatic, in L forearm only, declined medications  White coat syndrome with hypertension  Assessment & Plan:  At goal at home, uncontrolled in the office, no changes pending review of labs   Orders:  -     Comprehensive Metabolic Panel; Future  -     CBC; Future  Tremor  Assessment & Plan:  Chronic issue, she has seen neurology. She has a lot of questions regarding if this is PD vs ET.  We reviewed neurology note and I have to agree there are some concerning features but there are also some that don't sound like PD.  I encouraged her to schedule a f/u with neurologist to revisit.  If diagnosis is confirmed then treatment is up to her, but I think she needs to see neurology again.   Orders:  -     [facility] - Fatoumata Taylor DO, Neurology, [place] ([address])  PAC (premature atrial contraction)  Assessment & Plan:  Asymptomatic, seen on EKG a few years ago, will monitor   Glucose intolerance (impaired glucose tolerance)  Assessment & Plan:  Asymptomatic, reviewed A1c vs FBS, will check labs, continue to work on diet   Orders:  -     Comprehensive Metabolic Panel; Future  -     Hemoglobin A1C; Future     Recommendations for Preventive Services Due: see orders and patient instructions/AVS.  Recommended screening schedule for the next 5-10 years is provided to the patient in written form: see Patient Instructions/AVS.     Return in 1 year (on 7/29/2025) for Medicare AWV.       Subjective   Since last visit: weight has increased..  Last seen in Jan '24 for venous statis.     See ACP Note for Advanced Care Directive Discussion    Health Maintenance  Immunizations:   COVID: up to date, will plan on getting this fall  Influenza: up to date, will plan on

## 2024-07-29 NOTE — ACP (ADVANCE CARE PLANNING)
Advance Care Planning   Advance Care Planning (ACP) Physician/NP/PA (Provider) Conversation    Date of ACP Conversation: 07/29/24  Persons included in Conversation:  patient  Length of ACP Conversation in minutes: <16 minutes (Non-Billable)    We discussed the patient’s choices for care and treatment preferences in case of a health event that adversely affects decision-making abilities or is life-limiting. We discusses the differences between FULL CODE and DNR and when to consider a change in code status.  The limitations with CPR were reviewed.    Has ACP document(s) NOT on file - requested patient to provide.  She confirms current DNR status - completed forms on file (place new order if needed)    The patient has appointed the following active healthcare agents:    Primary Decision Maker: Lakisha Marcelino - Child - [phone]     Jarrell Caicedo MD

## 2024-07-29 NOTE — ASSESSMENT & PLAN NOTE
Chronic issue, she has seen neurology. She has a lot of questions regarding if this is PD vs ET.  We reviewed neurology note and I have to agree there are some concerning features but there are also some that don't sound like PD.  I encouraged her to schedule a f/u with neurologist to revisit.  If diagnosis is confirmed then treatment is up to her, but I think she needs to see neurology again.

## 2024-07-31 DIAGNOSIS — R73.02 GLUCOSE INTOLERANCE (IMPAIRED GLUCOSE TOLERANCE): ICD-10-CM

## 2024-07-31 DIAGNOSIS — I10 WHITE COAT SYNDROME WITH HYPERTENSION: ICD-10-CM

## 2024-07-31 LAB
ALBUMIN SERPL-MCNC: 3.9 G/DL (ref 3.5–5)
ALBUMIN/GLOB SERPL: 1.3 (ref 1.1–2.2)
ALP SERPL-CCNC: 67 U/L (ref 45–117)
ALT SERPL-CCNC: 22 U/L (ref 12–78)
ANION GAP SERPL CALC-SCNC: 5 MMOL/L (ref 5–15)
AST SERPL-CCNC: 20 U/L (ref 15–37)
BILIRUB SERPL-MCNC: 0.8 MG/DL (ref 0.2–1)
BUN SERPL-MCNC: 19 MG/DL (ref 6–20)
BUN/CREAT SERPL: 25 (ref 12–20)
CALCIUM SERPL-MCNC: 9.4 MG/DL (ref 8.5–10.1)
CHLORIDE SERPL-SCNC: 105 MMOL/L (ref 97–108)
CO2 SERPL-SCNC: 29 MMOL/L (ref 21–32)
CREAT SERPL-MCNC: 0.76 MG/DL (ref 0.55–1.02)
ERYTHROCYTE [DISTWIDTH] IN BLOOD BY AUTOMATED COUNT: 13 % (ref 11.5–14.5)
EST. AVERAGE GLUCOSE BLD GHB EST-MCNC: 126 MG/DL
GLOBULIN SER CALC-MCNC: 3 G/DL (ref 2–4)
GLUCOSE SERPL-MCNC: 102 MG/DL (ref 65–100)
HBA1C MFR BLD: 6 % (ref 4–5.6)
HCT VFR BLD AUTO: 41.4 % (ref 35–47)
HGB BLD-MCNC: 14 G/DL (ref 11.5–16)
MCH RBC QN AUTO: 32.2 PG (ref 26–34)
MCHC RBC AUTO-ENTMCNC: 33.8 G/DL (ref 30–36.5)
MCV RBC AUTO: 95.2 FL (ref 80–99)
NRBC # BLD: 0 K/UL (ref 0–0.01)
NRBC BLD-RTO: 0 PER 100 WBC
PLATELET # BLD AUTO: 318 K/UL (ref 150–400)
PMV BLD AUTO: 10.1 FL (ref 8.9–12.9)
POTASSIUM SERPL-SCNC: 4.7 MMOL/L (ref 3.5–5.1)
PROT SERPL-MCNC: 6.9 G/DL (ref 6.4–8.2)
RBC # BLD AUTO: 4.35 M/UL (ref 3.8–5.2)
SODIUM SERPL-SCNC: 139 MMOL/L (ref 136–145)
WBC # BLD AUTO: 5.6 K/UL (ref 3.6–11)

## 2024-08-01 NOTE — RESULT ENCOUNTER NOTE
Results released to patient via [employer].  All labs are stable or at goal for her.  NEPHROLOGY CONSULTATION NOTE    Pt is a 89 y/o male with PMH of ESRD on HD MWF, HLD, HTN, CAD/MI, s/p TAVR 2016 in ER with c/o Lightheadedness, pre-syncope.   As per the pt wife, Pt was feeling his usual self when he had breakfast this morning. Pt then started to complain that he was feeling dizzy. As per the wife, pt then pointed to his head- felt dizzy- his eyes rolled back but pt didnt pass out. Denies any LOC, Head trauma, chest pain, SOB, seizure like activity. As per the wife- the episode lasted for a 2-3 seconds. Pt didnot have post ictal confusion and was at his baseline mental status after the episode. After the episode, pt complained of dizziness and vomited when he tried to eat. Wife took the pt vitals- /39, HR 41. Pt was brought to the ED for further evaluation.     VS on arrival noted for /86. Labs with lymphopenia,  Elevated Cr (ESRD), Trop 0.16. Received doxycycline in the ED. Concern for COVID during ED admission. (3/22/2020)    PAST MEDICAL & SURGICAL HISTORY:  ESRD (end stage renal disease) on dialysis  Urinary retention  Colon perforation: 2011  TIA (transient ischemic attack): 2008  Gout  Chronic renal failure: on HD  Aortic stenosis, severe  Myocardial infarction  Hypertension  Hyperlipidemia  Congestive heart failure  History of cholecystectomy  Bilateral cataracts  History of hip replacement, total, left  History of colon resection: secondary to colon perforation  AV fistula: right upper arm  Gallbladder disorder: stent removal 10/2017  S/P TAVR (transcatheter aortic valve replacement)    Allergies:  Biaxin (Unknown)  Ceftin (Unknown)  linezolid (Other)  Plavix (Unknown)  Vitamin D (Unknown)  Vitamin D3 (Unknown)    Home Medications Reviewed  Hospital Medications:   MEDICATIONS  (STANDING):  aspirin enteric coated 81 milliGRAM(s) Oral daily  calcium acetate 667 milliGRAM(s) Oral three times a day with meals  chlorhexidine 4% Liquid 1 Application(s) Topical <User Schedule>  heparin  Injectable 5000 Unit(s) SubCutaneous every 12 hours  losartan 12.5 milliGRAM(s) Oral daily  melatonin 3 milliGRAM(s) Oral at bedtime  metoprolol tartrate 25 milliGRAM(s) Oral two times a day  multivitamin 1 Tablet(s) Oral daily  simvastatin 20 milliGRAM(s) Oral at bedtime  tamsulosin 0.4 milliGRAM(s) Oral at bedtime      SOCIAL HISTORY:  Denies ETOH,Smoking,   FAMILY HISTORY:  Family history of emphysema (Father)  Family history of stroke (Mother)        REVIEW OF SYSTEMS:    All other review of systems is negative unless indicated above.    VITALS:  T(F): 98.8 (03-22-20 @ 03:55), Max: 98.8 (03-22-20 @ 03:55)  HR: 85 (03-22-20 @ 06:39)  BP: 189/84 (03-22-20 @ 06:39)  RR: 20 (03-22-20 @ 06:39)  SpO2: 99% (03-22-20 @ 03:55)    Height (cm): 170.18 (03-21 @ 19:05)  Weight (kg): 68 (03-21 @ 19:05)  BMI (kg/m2): 23.5 (03-21 @ 19:05)  BSA (m2): 1.79 (03-21 @ 19:05)    I&O's Detail        PHYSICAL EXAM:  Constitutional: NAD  :  No ross.     examination limited.      LABS:  03-22    143  |  97<L>  |  55<H>  ----------------------------<  145<H>  4.2   |  24  |  5.3<HH>    Ca    8.7      22 Mar 2020 06:29  Mg     2.5     03-22    TPro  7.1  /  Alb  4.5  /  TBili  1.1  /  DBili      /  AST  24  /  ALT  16  /  AlkPhos  51  03-22    Creatinine Trend: 5.3 <--, 4.9 <--                        12.0   18.62 )-----------( 150      ( 22 Mar 2020 06:29 )             35.5       Urine Studies:    RADIOLOGY & ADDITIONAL STUDIES:  < from: CT Head No Cont (03.21.20 @ 22:32) >  Impression:     No CT evidence for acute intracranial pathology.    < end of copied text >    < from: Xray Chest 1 View AP/PA (03.21.20 @ 22:53) >  Impression:      Bibasilar opacities. No pleural effusion or pneumothorax.    < end of copied text >

## 2025-02-10 ENCOUNTER — HOSPITAL ENCOUNTER (OUTPATIENT)
Facility: HOSPITAL | Age: 89
Discharge: HOME | End: 2025-02-13
Attending: INTERNAL MEDICINE
Payer: MEDICARE

## 2025-02-10 ENCOUNTER — OFFICE VISIT (OUTPATIENT)
Age: 89
End: 2025-02-10
Payer: MEDICARE

## 2025-02-10 VITALS
WEIGHT: 171.2 LBS | HEIGHT: 65 IN | HEART RATE: 92 BPM | DIASTOLIC BLOOD PRESSURE: 79 MMHG | TEMPERATURE: 97.7 F | SYSTOLIC BLOOD PRESSURE: 138 MMHG | RESPIRATION RATE: 16 BRPM | BODY MASS INDEX: 28.52 KG/M2 | OXYGEN SATURATION: 96 %

## 2025-02-10 DIAGNOSIS — R05.1 ACUTE COUGH: ICD-10-CM

## 2025-02-10 DIAGNOSIS — R53.83 FATIGUE, UNSPECIFIED TYPE: Primary | ICD-10-CM

## 2025-02-10 DIAGNOSIS — N90.89 ACQUIRED LABIAL ADHESION: ICD-10-CM

## 2025-02-10 DIAGNOSIS — N32.81 OAB (OVERACTIVE BLADDER): ICD-10-CM

## 2025-02-10 DIAGNOSIS — R53.83 FATIGUE, UNSPECIFIED TYPE: ICD-10-CM

## 2025-02-10 DIAGNOSIS — J34.89 RHINORRHEA: ICD-10-CM

## 2025-02-10 PROCEDURE — G8419 CALC BMI OUT NRM PARAM NOF/U: HCPCS | Performed by: INTERNAL MEDICINE

## 2025-02-10 PROCEDURE — 99213 OFFICE O/P EST LOW 20 MIN: CPT | Performed by: INTERNAL MEDICINE

## 2025-02-10 PROCEDURE — 1090F PRES/ABSN URINE INCON ASSESS: CPT | Performed by: INTERNAL MEDICINE

## 2025-02-10 PROCEDURE — G8427 DOCREV CUR MEDS BY ELIG CLIN: HCPCS | Performed by: INTERNAL MEDICINE

## 2025-02-10 PROCEDURE — 1159F MED LIST DOCD IN RCRD: CPT | Performed by: INTERNAL MEDICINE

## 2025-02-10 PROCEDURE — 1123F ACP DISCUSS/DSCN MKR DOCD: CPT | Performed by: INTERNAL MEDICINE

## 2025-02-10 PROCEDURE — 71046 X-RAY EXAM CHEST 2 VIEWS: CPT

## 2025-02-10 PROCEDURE — 1036F TOBACCO NON-USER: CPT | Performed by: INTERNAL MEDICINE

## 2025-02-10 PROCEDURE — 1126F AMNT PAIN NOTED NONE PRSNT: CPT | Performed by: INTERNAL MEDICINE

## 2025-02-10 PROCEDURE — 1160F RVW MEDS BY RX/DR IN RCRD: CPT | Performed by: INTERNAL MEDICINE

## 2025-02-10 RX ORDER — ESTRADIOL 0.1 MG/G
CREAM VAGINAL
Qty: 42.5 G | Refills: 0 | Status: SHIPPED | OUTPATIENT
Start: 2025-02-10

## 2025-02-10 SDOH — ECONOMIC STABILITY: FOOD INSECURITY: WITHIN THE PAST 12 MONTHS, YOU WORRIED THAT YOUR FOOD WOULD RUN OUT BEFORE YOU GOT MONEY TO BUY MORE.: NEVER TRUE

## 2025-02-10 SDOH — ECONOMIC STABILITY: FOOD INSECURITY: WITHIN THE PAST 12 MONTHS, THE FOOD YOU BOUGHT JUST DIDN'T LAST AND YOU DIDN'T HAVE MONEY TO GET MORE.: NEVER TRUE

## 2025-02-10 ASSESSMENT — PATIENT HEALTH QUESTIONNAIRE - PHQ9
SUM OF ALL RESPONSES TO PHQ QUESTIONS 1-9: 0
SUM OF ALL RESPONSES TO PHQ QUESTIONS 1-9: 0
2. FEELING DOWN, DEPRESSED OR HOPELESS: NOT AT ALL
SUM OF ALL RESPONSES TO PHQ QUESTIONS 1-9: 0
1. LITTLE INTEREST OR PLEASURE IN DOING THINGS: NOT AT ALL
SUM OF ALL RESPONSES TO PHQ QUESTIONS 1-9: 0
SUM OF ALL RESPONSES TO PHQ9 QUESTIONS 1 & 2: 0

## 2025-02-10 NOTE — PROGRESS NOTES
Lorena Perez is a 89 y.o. female who was seen in clinic today (2/10/2025).    Assessment & Plan:   Below is the assessment and plan developed based on review of pertinent history, physical exam, labs, studies, and medications.  Assessment & Plan  1. Fatigue.  this is a new problem, differential dx reviewed with the patient, exact etiology is unclear at this time. Will check labs and CXR. Red flags were reviewed with the patient to RTC or notify me.     2. Cough.  this is a new problem and exact etiology is unclear.  Favor infectious over allergies. COVID and Flu negative at home. Doubt PE. Will check labs and imaging. Red flags and expectations reviewed.      3. Rhinorrhea  this is a new problem to me but a chronic issue for her. L sided only. Nothing to suggest CNS leak or sinus infection. Restart Astepro. Red flags and expectations reviewed.      4. Labial adhesions  Chronic issue, well controlled. Has not seen urology since '21 or '22. Refilled by me last year. Will refill again as she is she is taking her medication(s) as directed & without any side effects. She feels it is working. She is requesting referral to get back into see [facility]. Referral placed.    4. OAB  Chronic issue with fluctuating control. She reports vaginal estrogen helps. Has not seen urology since '21 or '22. She is requesting referral to get back into see [facility]. Referral placed.     Diagnoses/Orders:   1. Fatigue, unspecified type  -     CBC with Auto Differential; Future  -     XR CHEST (2 VW); Future  -     Basic Metabolic Panel; Future  2. Acute cough  -     CBC with Auto Differential; Future  -     XR CHEST (2 VW); Future  3. Rhinorrhea  4. Acquired labial adhesion  -     estradiol (ESTRACE) 0.1 MG/GM vaginal cream; Apply 1/2 g (pea-sized amount) vaginally 3 times per week, Disp-42.5 g, R-0Normal  -     External Referral To Urology  5. OAB (overactive bladder)  -     External Referral To Urology     Return if symptoms

## 2025-02-11 DIAGNOSIS — R53.83 FATIGUE, UNSPECIFIED TYPE: Primary | ICD-10-CM

## 2025-02-11 DIAGNOSIS — I51.7 ENLARGED HEART: ICD-10-CM

## 2025-02-11 LAB
ANION GAP SERPL CALC-SCNC: 7 MMOL/L (ref 2–12)
BASOPHILS # BLD: 0.06 K/UL (ref 0–0.1)
BASOPHILS NFR BLD: 0.6 % (ref 0–1)
BUN SERPL-MCNC: 16 MG/DL (ref 6–20)
BUN/CREAT SERPL: 19 (ref 12–20)
CALCIUM SERPL-MCNC: 9.3 MG/DL (ref 8.5–10.1)
CHLORIDE SERPL-SCNC: 100 MMOL/L (ref 97–108)
CO2 SERPL-SCNC: 29 MMOL/L (ref 21–32)
COMMENT:: NORMAL
CREAT SERPL-MCNC: 0.85 MG/DL (ref 0.55–1.02)
DIFFERENTIAL METHOD BLD: ABNORMAL
EOSINOPHIL # BLD: 0.12 K/UL (ref 0–0.4)
EOSINOPHIL NFR BLD: 1.3 % (ref 0–7)
ERYTHROCYTE [DISTWIDTH] IN BLOOD BY AUTOMATED COUNT: 12.6 % (ref 11.5–14.5)
GLUCOSE SERPL-MCNC: 94 MG/DL (ref 65–100)
HCT VFR BLD AUTO: 43.6 % (ref 35–47)
HGB BLD-MCNC: 14 G/DL (ref 11.5–16)
IMM GRANULOCYTES # BLD AUTO: 0.04 K/UL (ref 0–0.04)
IMM GRANULOCYTES NFR BLD AUTO: 0.4 % (ref 0–0.5)
LYMPHOCYTES # BLD: 0.94 K/UL (ref 0.8–3.5)
LYMPHOCYTES NFR BLD: 10 % (ref 12–49)
MCH RBC QN AUTO: 31 PG (ref 26–34)
MCHC RBC AUTO-ENTMCNC: 32.1 G/DL (ref 30–36.5)
MCV RBC AUTO: 96.5 FL (ref 80–99)
MONOCYTES # BLD: 1.26 K/UL (ref 0–1)
MONOCYTES NFR BLD: 13.4 % (ref 5–13)
NEUTS SEG # BLD: 6.99 K/UL (ref 1.8–8)
NEUTS SEG NFR BLD: 74.3 % (ref 32–75)
NRBC # BLD: 0 K/UL (ref 0–0.01)
NRBC BLD-RTO: 0 PER 100 WBC
PLATELET # BLD AUTO: 409 K/UL (ref 150–400)
PMV BLD AUTO: 9.9 FL (ref 8.9–12.9)
POTASSIUM SERPL-SCNC: 3.8 MMOL/L (ref 3.5–5.1)
RBC # BLD AUTO: 4.52 M/UL (ref 3.8–5.2)
SODIUM SERPL-SCNC: 136 MMOL/L (ref 136–145)
SPECIMEN HOLD: NORMAL
WBC # BLD AUTO: 9.4 K/UL (ref 3.6–11)

## 2025-02-11 NOTE — RESULT ENCOUNTER NOTE
Results reviewed.  Please call patient. CXR shows an enlarged heart, but nothing else abnormal.  TTE ordered.

## 2025-02-13 ENCOUNTER — TELEPHONE (OUTPATIENT)
Age: 89
End: 2025-02-13

## 2025-02-17 NOTE — TELEPHONE ENCOUNTER
Patient is scheduled with Echo for Friday. Last Monday seen in office. Out of breath and fatigue, dry cough. Patient thought allergies. Test ordered. Cough-blowing nose and spitting clear mucus-thick opaque for 3 days. Last night felt better, around the house doing things. Today rebound sx-feeling tired/dry cough. Patient has not used any over the counter meds. Using Alfonzo's rub. Feel build up of nasal and chest congestion. Denies fever, headache, dizziness. No chest pain. Have mucinex at home. Will forward to MD to ask if appt needed or  can patient try OTC meds.

## 2025-02-17 NOTE — TELEPHONE ENCOUNTER
No appointment needed.  Symptoms are fluctuating, so I don't think she needs an antibiotic.  I do recommend symptoms control with OTC medications: Mucinex or Mucinex-DM, continue Vicks rub.  Update me in 3-4 days with how she is doing.

## 2025-02-17 NOTE — TELEPHONE ENCOUNTER
Notified patient per MD note. Patient report as day went on today, had a pretty good day. Understanding verbalized.

## 2025-02-18 ENCOUNTER — APPOINTMENT (OUTPATIENT)
Facility: HOSPITAL | Age: 89
End: 2025-02-18
Payer: MEDICARE

## 2025-02-18 ENCOUNTER — HOSPITAL ENCOUNTER (EMERGENCY)
Facility: HOSPITAL | Age: 89
Discharge: HOME | End: 2025-02-18
Attending: EMERGENCY MEDICINE
Payer: MEDICARE

## 2025-02-18 VITALS
HEART RATE: 76 BPM | SYSTOLIC BLOOD PRESSURE: 137 MMHG | BODY MASS INDEX: 30.18 KG/M2 | OXYGEN SATURATION: 94 % | HEIGHT: 62 IN | RESPIRATION RATE: 18 BRPM | TEMPERATURE: 98.8 F | WEIGHT: 164 LBS | DIASTOLIC BLOOD PRESSURE: 57 MMHG

## 2025-02-18 DIAGNOSIS — J40 BRONCHITIS: Primary | ICD-10-CM

## 2025-02-18 LAB
ALBUMIN SERPL-MCNC: 3.1 G/DL (ref 3.5–5)
ALBUMIN/GLOB SERPL: 0.6 (ref 1.1–2.2)
ALP SERPL-CCNC: 80 U/L (ref 45–117)
ALT SERPL-CCNC: 22 U/L (ref 12–78)
ANION GAP SERPL CALC-SCNC: 7 MMOL/L (ref 2–12)
APPEARANCE UR: CLEAR
AST SERPL-CCNC: 16 U/L (ref 15–37)
BACTERIA URNS QL MICRO: NEGATIVE /HPF
BASOPHILS # BLD: 0.05 K/UL (ref 0–0.1)
BASOPHILS NFR BLD: 0.6 % (ref 0–1)
BILIRUB SERPL-MCNC: 0.8 MG/DL (ref 0.2–1)
BILIRUB UR QL: NEGATIVE
BUN SERPL-MCNC: 11 MG/DL (ref 6–20)
BUN/CREAT SERPL: 15 (ref 12–20)
CALCIUM SERPL-MCNC: 9.8 MG/DL (ref 8.5–10.1)
CHLORIDE SERPL-SCNC: 99 MMOL/L (ref 97–108)
CO2 SERPL-SCNC: 28 MMOL/L (ref 21–32)
COLOR UR: ABNORMAL
COMMENT:: NORMAL
CREAT SERPL-MCNC: 0.75 MG/DL (ref 0.55–1.02)
DIFFERENTIAL METHOD BLD: ABNORMAL
EKG ATRIAL RATE: 94 BPM
EKG DIAGNOSIS: NORMAL
EKG P AXIS: 80 DEGREES
EKG P-R INTERVAL: 128 MS
EKG Q-T INTERVAL: 364 MS
EKG QRS DURATION: 92 MS
EKG QTC CALCULATION (BAZETT): 455 MS
EKG R AXIS: -24 DEGREES
EKG T AXIS: 138 DEGREES
EKG VENTRICULAR RATE: 94 BPM
EOSINOPHIL # BLD: 0.04 K/UL (ref 0–0.4)
EOSINOPHIL NFR BLD: 0.5 % (ref 0–7)
EPITH CASTS URNS QL MICRO: ABNORMAL /LPF
ERYTHROCYTE [DISTWIDTH] IN BLOOD BY AUTOMATED COUNT: 12.4 % (ref 11.5–14.5)
FLUAV RNA SPEC QL NAA+PROBE: NOT DETECTED
FLUBV RNA SPEC QL NAA+PROBE: NOT DETECTED
GLOBULIN SER CALC-MCNC: 4.9 G/DL (ref 2–4)
GLUCOSE SERPL-MCNC: 107 MG/DL (ref 65–100)
GLUCOSE UR STRIP.AUTO-MCNC: NEGATIVE MG/DL
HCT VFR BLD AUTO: 44 % (ref 35–47)
HGB BLD-MCNC: 14.5 G/DL (ref 11.5–16)
HGB UR QL STRIP: ABNORMAL
HYALINE CASTS URNS QL MICRO: ABNORMAL /LPF (ref 0–5)
IMM GRANULOCYTES # BLD AUTO: 0.06 K/UL (ref 0–0.04)
IMM GRANULOCYTES NFR BLD AUTO: 0.7 % (ref 0–0.5)
KETONES UR QL STRIP.AUTO: NEGATIVE MG/DL
LEUKOCYTE ESTERASE UR QL STRIP.AUTO: ABNORMAL
LYMPHOCYTES # BLD: 0.65 K/UL (ref 0.8–3.5)
LYMPHOCYTES NFR BLD: 7.5 % (ref 12–49)
MCH RBC QN AUTO: 31 PG (ref 26–34)
MCHC RBC AUTO-ENTMCNC: 33 G/DL (ref 30–36.5)
MCV RBC AUTO: 94.2 FL (ref 80–99)
MONOCYTES # BLD: 0.96 K/UL (ref 0–1)
MONOCYTES NFR BLD: 11.2 % (ref 5–13)
NEUTS SEG # BLD: 6.84 K/UL (ref 1.8–8)
NEUTS SEG NFR BLD: 79.5 % (ref 32–75)
NITRITE UR QL STRIP.AUTO: NEGATIVE
NRBC # BLD: 0 K/UL (ref 0–0.01)
NRBC BLD-RTO: 0 PER 100 WBC
PH UR STRIP: 6 (ref 5–8)
PLATELET # BLD AUTO: 498 K/UL (ref 150–400)
PMV BLD AUTO: 9.1 FL (ref 8.9–12.9)
POTASSIUM SERPL-SCNC: 3.9 MMOL/L (ref 3.5–5.1)
PROT SERPL-MCNC: 8 G/DL (ref 6.4–8.2)
PROT UR STRIP-MCNC: NEGATIVE MG/DL
RBC # BLD AUTO: 4.67 M/UL (ref 3.8–5.2)
RBC #/AREA URNS HPF: ABNORMAL /HPF (ref 0–5)
RBC MORPH BLD: ABNORMAL
SARS-COV-2 RNA RESP QL NAA+PROBE: NOT DETECTED
SODIUM SERPL-SCNC: 134 MMOL/L (ref 136–145)
SOURCE: NORMAL
SP GR UR REFRACTOMETRY: 1.02 (ref 1–1.03)
SPECIMEN HOLD: NORMAL
SPECIMEN HOLD: NORMAL
TROPONIN I SERPL HS-MCNC: 15 NG/L (ref 0–51)
UROBILINOGEN UR QL STRIP.AUTO: 1 EU/DL (ref 0.2–1)
WBC # BLD AUTO: 8.6 K/UL (ref 3.6–11)
WBC URNS QL MICRO: ABNORMAL /HPF (ref 0–4)

## 2025-02-18 PROCEDURE — 71045 X-RAY EXAM CHEST 1 VIEW: CPT

## 2025-02-18 PROCEDURE — 80053 COMPREHEN METABOLIC PANEL: CPT

## 2025-02-18 PROCEDURE — 81001 URINALYSIS AUTO W/SCOPE: CPT

## 2025-02-18 PROCEDURE — 36415 COLL VENOUS BLD VENIPUNCTURE: CPT

## 2025-02-18 PROCEDURE — 93005 ELECTROCARDIOGRAM TRACING: CPT | Performed by: EMERGENCY MEDICINE

## 2025-02-18 PROCEDURE — 6370000000 HC RX 637 (ALT 250 FOR IP): Performed by: EMERGENCY MEDICINE

## 2025-02-18 PROCEDURE — 85025 COMPLETE CBC W/AUTO DIFF WBC: CPT

## 2025-02-18 PROCEDURE — 93010 ELECTROCARDIOGRAM REPORT: CPT | Performed by: INTERNAL MEDICINE

## 2025-02-18 PROCEDURE — 99285 EMERGENCY DEPT VISIT HI MDM: CPT

## 2025-02-18 PROCEDURE — 84484 ASSAY OF TROPONIN QUANT: CPT

## 2025-02-18 PROCEDURE — 87636 SARSCOV2 & INF A&B AMP PRB: CPT

## 2025-02-18 RX ORDER — ALBUTEROL SULFATE 90 UG/1
2 INHALANT RESPIRATORY (INHALATION) 4 TIMES DAILY PRN
Qty: 18 G | Refills: 5 | Status: SHIPPED | OUTPATIENT
Start: 2025-02-18

## 2025-02-18 RX ORDER — AZITHROMYCIN 250 MG/1
TABLET, FILM COATED ORAL
Qty: 6 TABLET | Refills: 0 | Status: SHIPPED | OUTPATIENT
Start: 2025-02-18 | End: 2025-02-28

## 2025-02-18 RX ORDER — AZITHROMYCIN 250 MG/1
500 TABLET, FILM COATED ORAL
Status: COMPLETED | OUTPATIENT
Start: 2025-02-18 | End: 2025-02-18

## 2025-02-18 RX ADMIN — AZITHROMYCIN 500 MG: 250 TABLET, FILM COATED ORAL at 16:43

## 2025-02-18 ASSESSMENT — ENCOUNTER SYMPTOMS
DIARRHEA: 0
SORE THROAT: 0
NAUSEA: 0
COUGH: 1
SHORTNESS OF BREATH: 0
ABDOMINAL PAIN: 0
EYE DISCHARGE: 0
BACK PAIN: 0
VOMITING: 0
EYE PAIN: 0
FACIAL SWELLING: 0
CHEST TIGHTNESS: 0

## 2025-02-18 ASSESSMENT — PAIN - FUNCTIONAL ASSESSMENT: PAIN_FUNCTIONAL_ASSESSMENT: NONE - DENIES PAIN

## 2025-02-18 NOTE — ED TRIAGE NOTES
Patient arrives to ER via EMS from home. Reports being \"sicks\" for two weeks. Saw PCP last week and was told to take mucinex. Now feels SOB. Denies any chest pain.

## 2025-02-18 NOTE — DISCHARGE INSTR - COC
Continuity of Care Form    Patient Name: Lorena Perez   :  1935  MRN:  [id number]    Admit date:  (Not on file)  Discharge date:  ***    Code Status Order: Prior   Advance Directives:   Advance Care Flowsheet Documentation             Admitting Physician:  No admitting provider for patient encounter.  PCP: Jarrell Caicedo MD    Discharging Nurse: ***  Discharging Hospital Unit/Room#: No information available for this encounter.  Discharging Unit Phone Number: ***    Emergency Contact:   Extended Emergency Contact Information  Primary Emergency Contact: Lakisha Marcelino   [facility]  Home Phone: [phone]  Relation: Child    Past Surgical History:  Past Surgical History:   Procedure Laterality Date    BREAST BIOPSY      CATARACT REMOVAL Bilateral 2016    with stent placement left eye    COLONOSCOPY N/A 2015    hyperplastic & tubular adenoma; diverticulosis, int hemorrhoids; repeat 5 years.    HYSTERECTOMY, TOTAL ABDOMINAL (CERVIX REMOVED)      FELICE/BSO, dilation and curratagex2    PORTACATH PLACEMENT      INSERTION / REMOVAL    TONSILLECTOMY         Immunization History:   Immunization History   Administered Date(s) Administered    COVID-19, MODERNA BLUE border, Primary or Immunocompromised, (age 12y+), IM, 100 mcg/0.5mL 2021, 2021, 2021    COVID-19, MODERNA Bivalent, (age 12y+), IM, 50 mcg/0.5 mL 2022    COVID-19, MODERNA, (age 12y+), IM, 50mcg/0.5mL 2023    Influenza, FLUZONE High Dose (age 65 y+), IM, Quadv, 0.7mL 2020, 2023    Influenza, FLUZONE High Dose, (age 65 y+), IM, Trivalent PF, 0.5mL 10/18/2018, 2019, 2022    Pneumococcal, PCV-13, PREVNAR 13, (age 6w+), IM, 0.5mL 2015    Pneumococcal, PPSV23, PNEUMOVAX 23, (age 2y+), SC/IM, 0.5mL 2007    RSV, ABRYSVO, (Pregnant or age 60y+), PF, IM, 0.5mL 2024    TDaP, ADACEL (age 10y-64y), BOOSTRIX (age 10y+), IM, 0.5mL 2018    Zoster

## 2025-02-18 NOTE — ED PROVIDER NOTES
[facility] EMERGENCY DEPARTMENT  EMERGENCY DEPARTMENT ENCOUNTER      Pt Name: Lorena Perez  MRN: [id number]  Birthdate 1935  Date of evaluation: 2/18/2025  Provider: Jamar Carson MD    CHIEF COMPLAINT       Chief Complaint   Patient presents with    Fatigue         HISTORY OF PRESENT ILLNESS   (Location/Symptom, Timing/Onset, Context/Setting, Quality, Duration, Modifying Factors, Severity)  Note limiting factors.   89-year-old with cough and generalized weakness for the past 2 weeks.  No nausea or vomiting.  No fever or chills no sore throat or ear pain.  No headache.    The history is provided by the patient.   Cough  Cough characteristics:  Productive  Sputum characteristics:  Nondescript  Severity:  Mild  Onset quality:  Gradual  Timing:  Constant  Progression:  Worsening  Chronicity:  New  Smoker: no    Context: not animal exposure, not exposure to allergens, not fumes, not occupational exposure and not sick contacts    Relieved by:  Nothing  Worsened by:  Nothing  Ineffective treatments:  None tried  Associated symptoms: no chest pain, no chills, no diaphoresis, no ear pain, no eye discharge, no headaches, no myalgias, no rash, no shortness of breath and no sore throat          Review of External Medical Records:     Nursing Notes were reviewed.    REVIEW OF SYSTEMS    (2-9 systems for level 4, 10 or more for level 5)     Review of Systems   Constitutional:  Negative for activity change, appetite change, chills and diaphoresis.   HENT:  Negative for congestion, ear pain, facial swelling and sore throat.    Eyes:  Negative for pain, discharge and visual disturbance.   Respiratory:  Positive for cough. Negative for chest tightness and shortness of breath.    Cardiovascular:  Negative for chest pain and palpitations.   Gastrointestinal:  Negative for abdominal pain, diarrhea, nausea and vomiting.   Endocrine: Negative for cold intolerance, heat intolerance, polydipsia and polyphagia.

## 2025-02-28 ENCOUNTER — TELEPHONE (OUTPATIENT)
Age: 89
End: 2025-02-28

## 2025-03-05 ENCOUNTER — HOSPITAL ENCOUNTER (OUTPATIENT)
Facility: HOSPITAL | Age: 89
Discharge: HOME | End: 2025-03-07
Attending: INTERNAL MEDICINE
Payer: MEDICARE

## 2025-03-05 DIAGNOSIS — R53.83 FATIGUE, UNSPECIFIED TYPE: ICD-10-CM

## 2025-03-05 DIAGNOSIS — I51.7 ENLARGED HEART: ICD-10-CM

## 2025-03-05 LAB
ECHO AO ASC DIAM: 3.6 CM
ECHO AO ROOT DIAM: 3.7 CM
ECHO AV AREA PEAK VELOCITY: 2.8 CM2
ECHO AV AREA VTI: 2.8 CM2
ECHO AV MEAN GRADIENT: 4 MMHG
ECHO AV MEAN VELOCITY: 0.9 M/S
ECHO AV PEAK GRADIENT: 6 MMHG
ECHO AV PEAK VELOCITY: 1.3 M/S
ECHO AV VELOCITY RATIO: 0.85
ECHO AV VTI: 27.5 CM
ECHO EST RA PRESSURE: 3 MMHG
ECHO LA DIAMETER: 3.4 CM
ECHO LA TO AORTIC ROOT RATIO: 0.92
ECHO LA VOL A-L A2C: 52 ML (ref 22–52)
ECHO LA VOL A-L A4C: 57 ML (ref 22–52)
ECHO LA VOL MOD A2C: 50 ML (ref 22–52)
ECHO LA VOL MOD A4C: 54 ML (ref 22–52)
ECHO LA VOLUME AREA LENGTH: 55 ML
ECHO LV E' LATERAL VELOCITY: 6.28 CM/S
ECHO LV E' SEPTAL VELOCITY: 5.57 CM/S
ECHO LV EF PHYSICIAN: 60 %
ECHO LV FRACTIONAL SHORTENING: 31 % (ref 28–44)
ECHO LV INTERNAL DIMENSION DIASTOLIC: 4.8 CM (ref 3.9–5.3)
ECHO LV INTERNAL DIMENSION SYSTOLIC: 3.3 CM
ECHO LV IVSD: 1 CM (ref 0.6–0.9)
ECHO LV MASS 2D: 158.8 G (ref 67–162)
ECHO LV POSTERIOR WALL DIASTOLIC: 0.9 CM (ref 0.6–0.9)
ECHO LV RELATIVE WALL THICKNESS RATIO: 0.38
ECHO LVOT AREA: 3.1 CM2
ECHO LVOT AV VTI INDEX: 0.9
ECHO LVOT DIAM: 2 CM
ECHO LVOT MEAN GRADIENT: 3 MMHG
ECHO LVOT PEAK GRADIENT: 5 MMHG
ECHO LVOT PEAK VELOCITY: 1.1 M/S
ECHO LVOT SV: 77.6 ML
ECHO LVOT VTI: 24.7 CM
ECHO MV A VELOCITY: 1.05 M/S
ECHO MV AREA PHT: 2.4 CM2
ECHO MV AREA VTI: 3.6 CM2
ECHO MV E DECELERATION TIME (DT): 314.7 MS
ECHO MV E VELOCITY: 0.48 M/S
ECHO MV E/A RATIO: 0.46
ECHO MV E/E' LATERAL: 7.64
ECHO MV E/E' RATIO (AVERAGED): 8.13
ECHO MV E/E' SEPTAL: 8.62
ECHO MV LVOT VTI INDEX: 0.87
ECHO MV MAX VELOCITY: 1.1 M/S
ECHO MV MEAN GRADIENT: 1 MMHG
ECHO MV MEAN VELOCITY: 0.5 M/S
ECHO MV PEAK GRADIENT: 4 MMHG
ECHO MV PRESSURE HALF TIME (PHT): 91.3 MS
ECHO MV REGURGITANT PEAK GRADIENT: 49 MMHG
ECHO MV REGURGITANT PEAK VELOCITY: 3.5 M/S
ECHO MV VTI: 21.6 CM
ECHO PULMONARY ARTERY SYSTOLIC PRESSURE (PASP): 40 MMHG
ECHO PV MAX VELOCITY: 0.9 M/S
ECHO PV PEAK GRADIENT: 3 MMHG
ECHO RIGHT VENTRICULAR SYSTOLIC PRESSURE (RVSP): 33 MMHG
ECHO RV TAPSE: 1 CM (ref 1.7–?)
ECHO TV REGURGITANT MAX VELOCITY: 2.73 M/S
ECHO TV REGURGITANT PEAK GRADIENT: 30 MMHG

## 2025-03-05 PROCEDURE — 93306 TTE W/DOPPLER COMPLETE: CPT

## 2025-03-06 NOTE — RESULT ENCOUNTER NOTE
Results reviewed.  Please call patient and notify her that her TTE is normal.  It was obtained due to enlarged heart seen on CXR.  Normal EF.  Normal heart size.  Nothing concerning, which is good news.  Nothing to explain her fatigue.

## 2025-03-16 ENCOUNTER — PATIENT MESSAGE (OUTPATIENT)
Age: 89
End: 2025-03-16

## 2025-05-05 ENCOUNTER — CLINICAL SUPPORT (OUTPATIENT)
Age: 89
End: 2025-05-05

## 2025-05-05 VITALS
OXYGEN SATURATION: 95 % | RESPIRATION RATE: 18 BRPM | HEART RATE: 84 BPM | DIASTOLIC BLOOD PRESSURE: 92 MMHG | SYSTOLIC BLOOD PRESSURE: 158 MMHG

## 2025-05-05 DIAGNOSIS — I10 WHITE COAT SYNDROME WITH HYPERTENSION: Primary | ICD-10-CM

## 2025-05-05 NOTE — PROGRESS NOTES
Patient walked into clinic today.  Was at urology this morning and BP's elevated, see nursing note.  Repeat BP improved, 158/92.  She sent in home BP readings in March that ranged -150's and DBP 70-80's.  On amlodipine.  She is asymptomatic.    No medication changes. Encouraged to check her BP 2-3 times per day and update me in 2-3 days.

## 2025-05-05 NOTE — PROGRESS NOTES
Readings from Urology while discussing procedures were as follows:    160/106  149/104  178/126  181/107

## 2025-05-11 ENCOUNTER — PATIENT MESSAGE (OUTPATIENT)
Age: 89
End: 2025-05-11

## 2025-06-27 RX ORDER — AMLODIPINE BESYLATE 5 MG/1
5 TABLET ORAL DAILY
Qty: 90 TABLET | Refills: 3 | Status: SHIPPED | OUTPATIENT
Start: 2025-06-27

## 2025-07-30 ENCOUNTER — TELEPHONE (OUTPATIENT)
Age: 89
End: 2025-07-30

## 2025-07-30 NOTE — TELEPHONE ENCOUNTER
Attempted to contact patient regarding upcoming Medicare wellness appointment and completion of HRA questionnaire. No answer, VM not available. MCM sent as well.

## 2025-08-01 ENCOUNTER — OFFICE VISIT (OUTPATIENT)
Age: 89
End: 2025-08-01
Payer: MEDICARE

## 2025-08-01 VITALS
BODY MASS INDEX: 26.2 KG/M2 | RESPIRATION RATE: 16 BRPM | SYSTOLIC BLOOD PRESSURE: 148 MMHG | HEIGHT: 66 IN | TEMPERATURE: 97.1 F | OXYGEN SATURATION: 97 % | DIASTOLIC BLOOD PRESSURE: 79 MMHG | HEART RATE: 69 BPM | WEIGHT: 163 LBS

## 2025-08-01 DIAGNOSIS — R79.89 ELEVATED PLATELET COUNT: ICD-10-CM

## 2025-08-01 DIAGNOSIS — I10 WHITE COAT SYNDROME WITH HYPERTENSION: ICD-10-CM

## 2025-08-01 DIAGNOSIS — Q52.5 LABIA MINORA AGGLUTINATION: ICD-10-CM

## 2025-08-01 DIAGNOSIS — I49.1 PAC (PREMATURE ATRIAL CONTRACTION): ICD-10-CM

## 2025-08-01 DIAGNOSIS — G62.9 POLYNEUROPATHY: ICD-10-CM

## 2025-08-01 DIAGNOSIS — Z71.89 ADVANCED CARE PLANNING/COUNSELING DISCUSSION: ICD-10-CM

## 2025-08-01 DIAGNOSIS — M81.6 LOCALIZED OSTEOPOROSIS WITHOUT CURRENT PATHOLOGICAL FRACTURE: ICD-10-CM

## 2025-08-01 DIAGNOSIS — R73.02 GLUCOSE INTOLERANCE (IMPAIRED GLUCOSE TOLERANCE): ICD-10-CM

## 2025-08-01 DIAGNOSIS — Z00.00 MEDICARE ANNUAL WELLNESS VISIT, SUBSEQUENT: Primary | ICD-10-CM

## 2025-08-01 PROCEDURE — G0439 PPPS, SUBSEQ VISIT: HCPCS | Performed by: INTERNAL MEDICINE

## 2025-08-01 PROCEDURE — 1123F ACP DISCUSS/DSCN MKR DOCD: CPT | Performed by: INTERNAL MEDICINE

## 2025-08-01 PROCEDURE — 1159F MED LIST DOCD IN RCRD: CPT | Performed by: INTERNAL MEDICINE

## 2025-08-01 PROCEDURE — 1126F AMNT PAIN NOTED NONE PRSNT: CPT | Performed by: INTERNAL MEDICINE

## 2025-08-01 PROCEDURE — 1160F RVW MEDS BY RX/DR IN RCRD: CPT | Performed by: INTERNAL MEDICINE

## 2025-08-01 PROCEDURE — 99497 ADVNCD CARE PLAN 30 MIN: CPT | Performed by: INTERNAL MEDICINE

## 2025-08-01 RX ORDER — CLOBETASOL PROPIONATE 0.5 MG/G
CREAM TOPICAL 2 TIMES DAILY
COMMUNITY
Start: 2025-06-05

## 2025-08-01 SDOH — ECONOMIC STABILITY: FOOD INSECURITY: WITHIN THE PAST 12 MONTHS, YOU WORRIED THAT YOUR FOOD WOULD RUN OUT BEFORE YOU GOT MONEY TO BUY MORE.: NEVER TRUE

## 2025-08-01 SDOH — ECONOMIC STABILITY: FOOD INSECURITY: WITHIN THE PAST 12 MONTHS, THE FOOD YOU BOUGHT JUST DIDN'T LAST AND YOU DIDN'T HAVE MONEY TO GET MORE.: NEVER TRUE

## 2025-08-01 ASSESSMENT — ENCOUNTER SYMPTOMS
SHORTNESS OF BREATH: 0
CONSTIPATION: 0
BLOOD IN STOOL: 0
COUGH: 0
DIARRHEA: 0
NAUSEA: 0
ABDOMINAL PAIN: 0
VOMITING: 0

## 2025-08-01 ASSESSMENT — PATIENT HEALTH QUESTIONNAIRE - PHQ9
SUM OF ALL RESPONSES TO PHQ QUESTIONS 1-9: 0
2. FEELING DOWN, DEPRESSED OR HOPELESS: NOT AT ALL
1. LITTLE INTEREST OR PLEASURE IN DOING THINGS: NOT AT ALL
SUM OF ALL RESPONSES TO PHQ QUESTIONS 1-9: 0

## 2025-08-01 NOTE — ASSESSMENT & PLAN NOTE
Asymptomatic, she is taking her medication(s) as directed & without any side effects. Continue with E cream.

## 2025-08-01 NOTE — ASSESSMENT & PLAN NOTE
At goal at home, elevated in the office, no changes pending review of labs    Finger washed out with VASHE wash. Irrigated with sterile water. Patient hand wrapped with pressure dressing. CMS intact

## 2025-08-01 NOTE — ASSESSMENT & PLAN NOTE
Asymptomatic, has been chronic and stable, reviewed A1c vs FBS, will check labs, continue to work on diet

## 2025-08-01 NOTE — PROGRESS NOTES
Medicare Annual Wellness Visit    Lorena Perez is here for Medicare AWV    Assessment & Plan   Medicare annual wellness visit, subsequent  Advanced care planning/counseling discussion  -     [place] Advanced Care Planning (16-30 minutes) [48013]  Localized osteoporosis without current pathological fracture  Assessment & Plan:  Asymptomatic, DEXA shows on located in L forearm, declined medications or repeat imaging  Orders:  -     Vitamin D 25 Hydroxy; Future  White coat syndrome with hypertension  Assessment & Plan:  At goal at home, elevated in the office, no changes pending review of labs   Orders:  -     Comprehensive Metabolic Panel; Future  Labia minora agglutination  Assessment & Plan:  Asymptomatic, she is taking her medication(s) as directed & without any side effects. Continue with E cream.   Glucose intolerance (impaired glucose tolerance)  Assessment & Plan:  Asymptomatic, has been chronic and stable, reviewed A1c vs FBS, will check labs, continue to work on diet   Orders:  -     Comprehensive Metabolic Panel; Future  -     Hemoglobin A1C; Future  PAC (premature atrial contraction)  Assessment & Plan:  Asymptomatic, EKG from the spring confirmed this and not a-fib, no changes and will monitor.  Orders:  -     Comprehensive Metabolic Panel; Future  Elevated platelet count  Comments:  new diagnosis, last two labs showed elevated PLT counts, differential reviewed with her, and will repeat labs  Orders:  -     CBC with Auto Differential; Future  Polyneuropathy  Comments:  chronic and stable, reassured, reviewed when to consider medications.  Assessment & Plan:  Chronic and stable, toes/feet only, not impacting her quality of life.      Recommendations for Preventive Services Due: see orders and patient instructions/AVS.  Recommended screening schedule for the next 5-10 years is provided to the patient in written form: see Patient Instructions/AVS.     Return in 1 year (on 8/1/2026) for Medicare AWV.

## 2025-08-01 NOTE — ACP (ADVANCE CARE PLANNING)
Advance Care Planning   Advance Care Planning (ACP) Physician/NP/PA (Provider) Conversation    Date of ACP Conversation: 08/01/25  Persons included in Conversation:  patient  Length of ACP Conversation in minutes: 16 minutes    We discussed the patient’s choices for care and treatment preferences in case of a health event that adversely affects decision-making abilities or is life-limiting. We discusses the differences between FULL CODE and DNR and when to consider a change in code status.  The limitations with CPR were reviewed.    Has ACP document(s) NOT on file - requested patient to provide.  She confirms current DNR status - completed forms on file (place new order if needed).  She had a lot of questions regarding FULL CODE vs DNR and these were answered to her satisfaction.     The patient has appointed the following active healthcare agents:    Primary Decision Maker: Lakisha Marcelino - Child - [phone]     Jarrell Caicedo MD

## 2025-08-02 LAB
25(OH)D3 SERPL-MCNC: 23.1 NG/ML (ref 30–100)
ALBUMIN SERPL-MCNC: 4.1 G/DL (ref 3.5–5.2)
ALBUMIN/GLOB SERPL: 1.5 (ref 1.1–2.2)
ALP SERPL-CCNC: 75 U/L (ref 35–104)
ALT SERPL-CCNC: 16 U/L (ref 10–35)
ANION GAP SERPL CALC-SCNC: 12 MMOL/L (ref 2–14)
AST SERPL-CCNC: 22 U/L (ref 10–35)
BASOPHILS # BLD: 0.06 K/UL (ref 0–0.1)
BASOPHILS NFR BLD: 0.9 % (ref 0–1)
BILIRUB SERPL-MCNC: 0.7 MG/DL (ref 0–1.2)
BUN SERPL-MCNC: 11 MG/DL (ref 8–23)
BUN/CREAT SERPL: 16 (ref 12–20)
CALCIUM SERPL-MCNC: 9.9 MG/DL (ref 8.8–10.2)
CHLORIDE SERPL-SCNC: 100 MMOL/L (ref 98–107)
CO2 SERPL-SCNC: 26 MMOL/L (ref 20–29)
CREAT SERPL-MCNC: 0.67 MG/DL (ref 0.6–1)
DIFFERENTIAL METHOD BLD: NORMAL
EOSINOPHIL # BLD: 0.14 K/UL (ref 0–0.4)
EOSINOPHIL NFR BLD: 2.1 % (ref 0–7)
ERYTHROCYTE [DISTWIDTH] IN BLOOD BY AUTOMATED COUNT: 12.9 % (ref 11.5–14.5)
EST. AVERAGE GLUCOSE BLD GHB EST-MCNC: 127 MG/DL
GLOBULIN SER CALC-MCNC: 2.8 G/DL (ref 2–4)
GLUCOSE SERPL-MCNC: 101 MG/DL (ref 65–100)
HBA1C MFR BLD: 6.1 % (ref 4–5.6)
HCT VFR BLD AUTO: 46.3 % (ref 35–47)
HGB BLD-MCNC: 14.8 G/DL (ref 11.5–16)
IMM GRANULOCYTES # BLD AUTO: 0.02 K/UL (ref 0–0.04)
IMM GRANULOCYTES NFR BLD AUTO: 0.3 % (ref 0–0.5)
LYMPHOCYTES # BLD: 1.28 K/UL (ref 0.8–3.5)
LYMPHOCYTES NFR BLD: 19.6 % (ref 12–49)
MCH RBC QN AUTO: 31.2 PG (ref 26–34)
MCHC RBC AUTO-ENTMCNC: 32 G/DL (ref 30–36.5)
MCV RBC AUTO: 97.7 FL (ref 80–99)
MONOCYTES # BLD: 0.68 K/UL (ref 0–1)
MONOCYTES NFR BLD: 10.4 % (ref 5–13)
NEUTS SEG # BLD: 4.36 K/UL (ref 1.8–8)
NEUTS SEG NFR BLD: 66.7 % (ref 32–75)
NRBC # BLD: 0 K/UL (ref 0–0.01)
NRBC BLD-RTO: 0 PER 100 WBC
PLATELET # BLD AUTO: 369 K/UL (ref 150–400)
PMV BLD AUTO: 10.1 FL (ref 8.9–12.9)
POTASSIUM SERPL-SCNC: 4.1 MMOL/L (ref 3.5–5.1)
PROT SERPL-MCNC: 7 G/DL (ref 6.4–8.3)
RBC # BLD AUTO: 4.74 M/UL (ref 3.8–5.2)
SODIUM SERPL-SCNC: 137 MMOL/L (ref 136–145)
WBC # BLD AUTO: 6.5 K/UL (ref 3.6–11)

## 2025-09-03 DIAGNOSIS — N90.89 ACQUIRED LABIAL ADHESION: ICD-10-CM

## 2025-09-04 RX ORDER — ESTRADIOL 0.1 MG/G
CREAM VAGINAL
Qty: 42.5 G | Refills: 0 | Status: SHIPPED | OUTPATIENT
Start: 2025-09-04 | End: 2025-09-05 | Stop reason: SDUPTHER

## 2025-09-05 RX ORDER — ESTRADIOL 0.1 MG/G
CREAM VAGINAL
Qty: 42.5 G | Refills: 0 | Status: SHIPPED | OUTPATIENT
Start: 2025-09-05